# Patient Record
Sex: FEMALE | Race: BLACK OR AFRICAN AMERICAN | Employment: OTHER | URBAN - METROPOLITAN AREA
[De-identification: names, ages, dates, MRNs, and addresses within clinical notes are randomized per-mention and may not be internally consistent; named-entity substitution may affect disease eponyms.]

---

## 2017-05-22 ENCOUNTER — OFFICE VISIT (OUTPATIENT)
Dept: FAMILY MEDICINE CLINIC | Age: 67
End: 2017-05-22
Payer: COMMERCIAL

## 2017-05-22 VITALS
SYSTOLIC BLOOD PRESSURE: 132 MMHG | DIASTOLIC BLOOD PRESSURE: 75 MMHG | BODY MASS INDEX: 28.34 KG/M2 | WEIGHT: 187 LBS | HEART RATE: 72 BPM | HEIGHT: 68 IN | RESPIRATION RATE: 16 BRPM

## 2017-05-22 DIAGNOSIS — Z79.4 TYPE 2 DIABETES MELLITUS WITH HYPERGLYCEMIA, WITH LONG-TERM CURRENT USE OF INSULIN (HCC): Primary | ICD-10-CM

## 2017-05-22 DIAGNOSIS — R60.0 EDEMA OF BOTH LEGS: ICD-10-CM

## 2017-05-22 DIAGNOSIS — E11.65 TYPE 2 DIABETES MELLITUS WITH HYPERGLYCEMIA, WITH LONG-TERM CURRENT USE OF INSULIN (HCC): Primary | ICD-10-CM

## 2017-05-22 DIAGNOSIS — I10 ESSENTIAL HYPERTENSION: ICD-10-CM

## 2017-05-22 DIAGNOSIS — E55.9 VITAMIN D DEFICIENCY: ICD-10-CM

## 2017-05-22 DIAGNOSIS — E78.00 PURE HYPERCHOLESTEROLEMIA: ICD-10-CM

## 2017-05-22 PROCEDURE — 99205 OFFICE O/P NEW HI 60 MIN: CPT | Performed by: FAMILY MEDICINE

## 2017-05-22 RX ORDER — POTASSIUM CHLORIDE 20 MEQ/1
20 TABLET, EXTENDED RELEASE ORAL DAILY
Qty: 90 TABLET | Refills: 1 | Status: SHIPPED | OUTPATIENT
Start: 2017-05-22 | End: 2017-05-25 | Stop reason: SDUPTHER

## 2017-05-22 RX ORDER — FUROSEMIDE 40 MG/1
40 TABLET ORAL DAILY
Qty: 90 TABLET | Refills: 1 | Status: SHIPPED | OUTPATIENT
Start: 2017-05-22 | End: 2017-05-25 | Stop reason: SDUPTHER

## 2017-05-22 ASSESSMENT — PATIENT HEALTH QUESTIONNAIRE - PHQ9
1. LITTLE INTEREST OR PLEASURE IN DOING THINGS: 0
2. FEELING DOWN, DEPRESSED OR HOPELESS: 0
SUM OF ALL RESPONSES TO PHQ QUESTIONS 1-9: 0
SUM OF ALL RESPONSES TO PHQ9 QUESTIONS 1 & 2: 0

## 2017-05-24 ENCOUNTER — TELEPHONE (OUTPATIENT)
Dept: FAMILY MEDICINE CLINIC | Age: 67
End: 2017-05-24

## 2017-05-25 DIAGNOSIS — E11.65 TYPE 2 DIABETES MELLITUS WITH HYPERGLYCEMIA, WITH LONG-TERM CURRENT USE OF INSULIN (HCC): ICD-10-CM

## 2017-05-25 DIAGNOSIS — R60.0 EDEMA OF BOTH LEGS: ICD-10-CM

## 2017-05-25 DIAGNOSIS — Z79.4 TYPE 2 DIABETES MELLITUS WITH HYPERGLYCEMIA, WITH LONG-TERM CURRENT USE OF INSULIN (HCC): ICD-10-CM

## 2017-05-25 RX ORDER — POTASSIUM CHLORIDE 20 MEQ/1
20 TABLET, EXTENDED RELEASE ORAL DAILY
Qty: 90 TABLET | Refills: 1 | Status: SHIPPED | OUTPATIENT
Start: 2017-05-25 | End: 2017-05-26 | Stop reason: SDUPTHER

## 2017-05-25 RX ORDER — FUROSEMIDE 40 MG/1
40 TABLET ORAL DAILY
Qty: 90 TABLET | Refills: 1 | Status: SHIPPED | OUTPATIENT
Start: 2017-05-25 | End: 2017-05-26 | Stop reason: SDUPTHER

## 2017-05-26 DIAGNOSIS — E11.65 TYPE 2 DIABETES MELLITUS WITH HYPERGLYCEMIA, WITH LONG-TERM CURRENT USE OF INSULIN (HCC): ICD-10-CM

## 2017-05-26 DIAGNOSIS — R60.0 EDEMA OF BOTH LEGS: ICD-10-CM

## 2017-05-26 DIAGNOSIS — Z79.4 TYPE 2 DIABETES MELLITUS WITH HYPERGLYCEMIA, WITH LONG-TERM CURRENT USE OF INSULIN (HCC): ICD-10-CM

## 2017-05-26 RX ORDER — LANOLIN ALCOHOL/MO/W.PET/CERES
400 CREAM (GRAM) TOPICAL DAILY
Qty: 30 TABLET | Refills: 12 | Status: SHIPPED | OUTPATIENT
Start: 2017-05-26 | End: 2019-02-06

## 2017-05-26 RX ORDER — ATORVASTATIN CALCIUM 10 MG/1
10 TABLET, FILM COATED ORAL DAILY
Qty: 30 TABLET | Refills: 12 | Status: SHIPPED | OUTPATIENT
Start: 2017-05-26 | End: 2018-07-30 | Stop reason: SDUPTHER

## 2017-05-26 RX ORDER — POTASSIUM CHLORIDE 20 MEQ/1
20 TABLET, EXTENDED RELEASE ORAL DAILY
Qty: 90 TABLET | Refills: 1 | Status: SHIPPED | OUTPATIENT
Start: 2017-05-26 | End: 2017-12-08 | Stop reason: SDUPTHER

## 2017-05-26 RX ORDER — FUROSEMIDE 40 MG/1
40 TABLET ORAL DAILY
Qty: 90 TABLET | Refills: 1 | Status: SHIPPED | OUTPATIENT
Start: 2017-05-26 | End: 2017-12-08 | Stop reason: SDUPTHER

## 2017-05-26 RX ORDER — OMEPRAZOLE 20 MG/1
20 CAPSULE, DELAYED RELEASE ORAL DAILY
Qty: 30 CAPSULE | Refills: 12 | Status: SHIPPED | OUTPATIENT
Start: 2017-05-26 | End: 2018-01-08 | Stop reason: SDUPTHER

## 2017-05-26 RX ORDER — LEVOTHYROXINE SODIUM 0.05 MG/1
50 TABLET ORAL DAILY
Qty: 30 TABLET | Refills: 12 | Status: SHIPPED | OUTPATIENT
Start: 2017-05-26 | End: 2018-07-30 | Stop reason: SDUPTHER

## 2017-05-26 RX ORDER — ASPIRIN 81 MG/1
81 TABLET ORAL DAILY
Qty: 30 TABLET | Refills: 12 | Status: SHIPPED | OUTPATIENT
Start: 2017-05-26

## 2017-05-26 RX ORDER — METOCLOPRAMIDE 5 MG/1
5 TABLET ORAL 4 TIMES DAILY
Qty: 120 TABLET | Refills: 12 | Status: SHIPPED | OUTPATIENT
Start: 2017-05-26 | End: 2018-07-30 | Stop reason: SDUPTHER

## 2017-06-01 ENCOUNTER — HOSPITAL ENCOUNTER (OUTPATIENT)
Dept: DIABETES SERVICES | Age: 67
Setting detail: THERAPIES SERIES
Discharge: HOME | End: 2017-06-01
Payer: COMMERCIAL

## 2017-06-01 VITALS
DIASTOLIC BLOOD PRESSURE: 63 MMHG | HEART RATE: 71 BPM | HEIGHT: 69 IN | SYSTOLIC BLOOD PRESSURE: 126 MMHG | BODY MASS INDEX: 28.41 KG/M2 | WEIGHT: 191.8 LBS

## 2017-06-01 PROCEDURE — G0108 DIAB MANAGE TRN  PER INDIV: HCPCS

## 2017-06-06 ENCOUNTER — TELEPHONE (OUTPATIENT)
Dept: FAMILY MEDICINE CLINIC | Age: 67
End: 2017-06-06

## 2017-06-12 ENCOUNTER — HOSPITAL ENCOUNTER (OUTPATIENT)
Dept: DIABETES SERVICES | Age: 67
Setting detail: THERAPIES SERIES
Discharge: HOME | End: 2017-06-12
Payer: COMMERCIAL

## 2017-06-12 PROCEDURE — G0109 DIAB MANAGE TRN IND/GROUP: HCPCS

## 2017-06-14 LAB
ABSOLUTE BASO #: 0 K/UL (ref 0–0.1)
ABSOLUTE EOS #: 0.1 K/UL (ref 0.1–0.4)
ABSOLUTE LYMPH #: 1.8 K/UL (ref 0.8–5.2)
ABSOLUTE MONO #: 0.2 K/UL (ref 0.1–0.9)
ABSOLUTE NEUT #: 1.5 K/UL (ref 1.3–9.1)
ALBUMIN SERPL-MCNC: 3.9 G/DL (ref 3.2–5.3)
ALK PHOSPHATASE: 101 IU/L (ref 35–121)
ALT SERPL-CCNC: 15 IU/L (ref 5–59)
ANION GAP SERPL CALCULATED.3IONS-SCNC: 15 MMOL/L
AST SERPL-CCNC: 11 IU/L (ref 10–42)
BASOPHILS RELATIVE PERCENT: 0.3 %
BILIRUB SERPL-MCNC: 1.2 MG/DL (ref 0.2–1.3)
BUN BLDV-MCNC: 14 MG/DL (ref 10–20)
CALCIUM SERPL-MCNC: 9.3 MG/DL (ref 8.7–10.8)
CHLORIDE BLD-SCNC: 98 MMOL/L (ref 95–111)
CHOLESTEROL/HDL RATIO: 2.9
CHOLESTEROL: 143 MG/DL
CO2: 33 MMOL/L (ref 21–32)
CREAT SERPL-MCNC: 0.8 MG/DL (ref 0.5–1.3)
EGFR AFRICAN AMERICAN: 87
EGFR IF NONAFRICAN AMERICAN: 72
EOSINOPHILS RELATIVE PERCENT: 1.7 %
GLUCOSE: 213 MG/DL (ref 70–100)
HCT VFR BLD CALC: 36.1 % (ref 36–48)
HDLC SERPL-MCNC: 50 MG/DL (ref 40–60)
HEMOGLOBIN: 11.7 G/DL (ref 12–16)
LDL CHOLESTEROL CALCULATED: 71 MG/DL
LDL/HDL RATIO: 1.4
LYMPHOCYTE %: 50.4 %
MCH RBC QN AUTO: 27.3 PG (ref 27–34)
MCHC RBC AUTO-ENTMCNC: 32.4 G/DL (ref 31–36)
MCV RBC AUTO: 84.1 FL (ref 80–100)
MONOCYTES # BLD: 6.8 %
NEUTROPHILS RELATIVE PERCENT: 40.8 %
PDW BLD-RTO: 12.6 % (ref 10.8–14.8)
PLATELETS: 206 K/UL (ref 150–450)
POTASSIUM SERPL-SCNC: 3.9 MMOL/L (ref 3.5–5.4)
RBC: 4.29 M/UL (ref 4–5.5)
SODIUM BLD-SCNC: 142 MMOL/L (ref 134–147)
T4 FREE: 0.84 NG/DL (ref 0.8–1.8)
TOTAL PROTEIN: 6.3 G/DL (ref 5.8–8)
TRIGL SERPL-MCNC: 108 MG/DL
TSH SERPL DL<=0.05 MIU/L-ACNC: 1.04 UIU/ML (ref 0.4–4.4)
VLDLC SERPL CALC-MCNC: 22 MG/DL
WBC: 3.6 K/UL (ref 3.7–10.8)

## 2017-06-15 LAB
PARATHYROID HORMONE INTACT: 60 PG/ML (ref 11–67)
THYROID PEROXIDASE ANTIBODY: 1 IU/ML
VITAMIN D 25-HYDROXY: 29 NG/ML

## 2017-06-20 ENCOUNTER — HOSPITAL ENCOUNTER (OUTPATIENT)
Dept: DIABETES SERVICES | Age: 67
Setting detail: THERAPIES SERIES
Discharge: HOME | End: 2017-06-20
Payer: COMMERCIAL

## 2017-06-20 PROCEDURE — G0108 DIAB MANAGE TRN  PER INDIV: HCPCS

## 2017-06-28 ENCOUNTER — HOSPITAL ENCOUNTER (OUTPATIENT)
Dept: DIABETES SERVICES | Age: 67
Setting detail: THERAPIES SERIES
Discharge: HOME | End: 2017-06-28
Payer: COMMERCIAL

## 2017-06-28 DIAGNOSIS — E11.9 TYPE 2 DIABETES MELLITUS WITHOUT COMPLICATION, WITH LONG-TERM CURRENT USE OF INSULIN (HCC): Primary | ICD-10-CM

## 2017-06-28 DIAGNOSIS — Z79.4 TYPE 2 DIABETES MELLITUS WITHOUT COMPLICATION, WITH LONG-TERM CURRENT USE OF INSULIN (HCC): Primary | ICD-10-CM

## 2017-06-28 PROCEDURE — G0109 DIAB MANAGE TRN IND/GROUP: HCPCS

## 2017-06-29 RX ORDER — ISOPROPYL ALCOHOL 0.7 ML/1
1 SWAB TOPICAL 3 TIMES DAILY
Qty: 100 EACH | Refills: 0 | Status: SHIPPED | OUTPATIENT
Start: 2017-06-29 | End: 2017-07-24 | Stop reason: SDUPTHER

## 2017-06-29 RX ORDER — LANCETS 30 GAUGE
1 EACH MISCELLANEOUS 3 TIMES DAILY
Qty: 100 EACH | Refills: 3 | Status: SHIPPED | OUTPATIENT
Start: 2017-06-29 | End: 2017-08-30 | Stop reason: SDUPTHER

## 2017-06-29 RX ORDER — BLOOD-GLUCOSE METER
1 EACH MISCELLANEOUS 3 TIMES DAILY
Qty: 1 KIT | Refills: 0 | Status: SHIPPED | OUTPATIENT
Start: 2017-06-29 | End: 2017-08-30 | Stop reason: SDUPTHER

## 2017-07-06 ENCOUNTER — HOSPITAL ENCOUNTER (OUTPATIENT)
Dept: DIABETES SERVICES | Age: 67
Setting detail: THERAPIES SERIES
Discharge: HOME | End: 2017-07-06
Payer: COMMERCIAL

## 2017-07-06 DIAGNOSIS — E11.9 TYPE 2 DIABETES MELLITUS WITHOUT COMPLICATION, WITH LONG-TERM CURRENT USE OF INSULIN (HCC): Primary | ICD-10-CM

## 2017-07-06 DIAGNOSIS — Z79.4 TYPE 2 DIABETES MELLITUS WITHOUT COMPLICATION, WITH LONG-TERM CURRENT USE OF INSULIN (HCC): Primary | ICD-10-CM

## 2017-07-06 PROCEDURE — G0109 DIAB MANAGE TRN IND/GROUP: HCPCS

## 2017-07-10 ENCOUNTER — HOSPITAL ENCOUNTER (OUTPATIENT)
Age: 67
Setting detail: SPECIMEN
Discharge: HOME | End: 2017-07-10
Payer: COMMERCIAL

## 2017-07-10 ENCOUNTER — OFFICE VISIT (OUTPATIENT)
Dept: FAMILY MEDICINE CLINIC | Age: 67
End: 2017-07-10
Payer: COMMERCIAL

## 2017-07-10 VITALS
SYSTOLIC BLOOD PRESSURE: 133 MMHG | RESPIRATION RATE: 16 BRPM | BODY MASS INDEX: 27.85 KG/M2 | HEART RATE: 68 BPM | WEIGHT: 188 LBS | HEIGHT: 69 IN | DIASTOLIC BLOOD PRESSURE: 74 MMHG

## 2017-07-10 DIAGNOSIS — R60.0 EDEMA OF BOTH LEGS: ICD-10-CM

## 2017-07-10 DIAGNOSIS — E08.621 DIABETIC ULCER OF BOTH FEET ASSOCIATED WITH DIABETES MELLITUS DUE TO UNDERLYING CONDITION (HCC): ICD-10-CM

## 2017-07-10 DIAGNOSIS — Z13.9 SCREENING: ICD-10-CM

## 2017-07-10 DIAGNOSIS — E11.42 DIABETIC POLYNEUROPATHY ASSOCIATED WITH TYPE 2 DIABETES MELLITUS (HCC): ICD-10-CM

## 2017-07-10 DIAGNOSIS — I10 ESSENTIAL HYPERTENSION: ICD-10-CM

## 2017-07-10 DIAGNOSIS — Z79.4 TYPE 2 DIABETES MELLITUS WITH HYPERGLYCEMIA, WITH LONG-TERM CURRENT USE OF INSULIN (HCC): Primary | ICD-10-CM

## 2017-07-10 DIAGNOSIS — L97.529 DIABETIC ULCER OF BOTH FEET ASSOCIATED WITH DIABETES MELLITUS DUE TO UNDERLYING CONDITION (HCC): ICD-10-CM

## 2017-07-10 DIAGNOSIS — Q74.2 CONGENITAL FOOT ABNORMALITY: ICD-10-CM

## 2017-07-10 DIAGNOSIS — E11.65 TYPE 2 DIABETES MELLITUS WITH HYPERGLYCEMIA, WITH LONG-TERM CURRENT USE OF INSULIN (HCC): Primary | ICD-10-CM

## 2017-07-10 DIAGNOSIS — L97.519 DIABETIC ULCER OF BOTH FEET ASSOCIATED WITH DIABETES MELLITUS DUE TO UNDERLYING CONDITION (HCC): ICD-10-CM

## 2017-07-10 LAB
CREATININE URINE: 67.2 MG/DL (ref 28–217)
HBA1C MFR BLD: 10.4 %
MICROALBUMIN/CREAT 24H UR: <12 MG/L
MICROALBUMIN/CREAT UR-RTO: 18 MCG/MG CREAT

## 2017-07-10 PROCEDURE — 99214 OFFICE O/P EST MOD 30 MIN: CPT | Performed by: FAMILY MEDICINE

## 2017-07-10 PROCEDURE — 83036 HEMOGLOBIN GLYCOSYLATED A1C: CPT | Performed by: FAMILY MEDICINE

## 2017-07-12 ENCOUNTER — TELEPHONE (OUTPATIENT)
Dept: FAMILY MEDICINE CLINIC | Age: 67
End: 2017-07-12

## 2017-07-24 RX ORDER — ISOPROPYL ALCOHOL 0.75 G/1
SWAB TOPICAL
Qty: 100 EACH | Refills: 0 | Status: SHIPPED | OUTPATIENT
Start: 2017-07-24 | End: 2017-09-25 | Stop reason: SDUPTHER

## 2017-07-25 ENCOUNTER — HOSPITAL ENCOUNTER (OUTPATIENT)
Dept: MAMMOGRAPHY | Age: 67
Discharge: HOME | End: 2017-07-25
Payer: COMMERCIAL

## 2017-07-25 DIAGNOSIS — Z12.39 BREAST SCREENING: ICD-10-CM

## 2017-07-25 PROCEDURE — G0202 SCR MAMMO BI INCL CAD: HCPCS

## 2017-08-30 RX ORDER — BLOOD SUGAR DIAGNOSTIC
STRIP MISCELLANEOUS
Qty: 300 STRIP | Refills: 3 | Status: SHIPPED | OUTPATIENT
Start: 2017-08-30 | End: 2018-01-23 | Stop reason: ALTCHOICE

## 2017-08-30 RX ORDER — BLOOD-GLUCOSE METER
EACH MISCELLANEOUS
Qty: 1 KIT | Refills: 0 | Status: SHIPPED | OUTPATIENT
Start: 2017-08-30 | End: 2018-01-23 | Stop reason: ALTCHOICE

## 2017-08-30 RX ORDER — LANCETS
EACH MISCELLANEOUS
Qty: 300 EACH | Refills: 3 | Status: SHIPPED | OUTPATIENT
Start: 2017-08-30 | End: 2020-02-13 | Stop reason: SDUPTHER

## 2017-09-07 ENCOUNTER — CARE COORDINATION (OUTPATIENT)
Dept: CARE COORDINATION | Age: 67
End: 2017-09-07

## 2017-09-15 ENCOUNTER — CARE COORDINATION (OUTPATIENT)
Dept: CARE COORDINATION | Age: 67
End: 2017-09-15

## 2017-09-22 ENCOUNTER — CARE COORDINATION (OUTPATIENT)
Dept: CARE COORDINATION | Age: 67
End: 2017-09-22

## 2017-09-25 RX ORDER — ISOPROPYL ALCOHOL 0.75 G/1
SWAB TOPICAL
Qty: 300 EACH | Refills: 0 | Status: SHIPPED | OUTPATIENT
Start: 2017-09-25 | End: 2017-11-27 | Stop reason: SDUPTHER

## 2017-09-25 RX ORDER — BRIMONIDINE TARTRATE 2 MG/ML
1 SOLUTION/ DROPS OPHTHALMIC 2 TIMES DAILY
Qty: 1 BOTTLE | Refills: 0 | Status: SHIPPED | OUTPATIENT
Start: 2017-09-25

## 2017-09-26 ENCOUNTER — TELEPHONE (OUTPATIENT)
Dept: FAMILY MEDICINE CLINIC | Age: 67
End: 2017-09-26

## 2017-09-29 ENCOUNTER — CARE COORDINATION (OUTPATIENT)
Dept: CARE COORDINATION | Age: 67
End: 2017-09-29

## 2017-10-09 ENCOUNTER — OFFICE VISIT (OUTPATIENT)
Dept: FAMILY MEDICINE CLINIC | Age: 67
End: 2017-10-09
Payer: COMMERCIAL

## 2017-10-09 VITALS
BODY MASS INDEX: 28.5 KG/M2 | OXYGEN SATURATION: 97 % | HEIGHT: 69 IN | WEIGHT: 192.4 LBS | RESPIRATION RATE: 16 BRPM | SYSTOLIC BLOOD PRESSURE: 129 MMHG | DIASTOLIC BLOOD PRESSURE: 68 MMHG | HEART RATE: 72 BPM

## 2017-10-09 DIAGNOSIS — Z79.4 TYPE 2 DIABETES MELLITUS WITH HYPERGLYCEMIA, WITH LONG-TERM CURRENT USE OF INSULIN (HCC): ICD-10-CM

## 2017-10-09 DIAGNOSIS — E11.65 TYPE 2 DIABETES MELLITUS WITH HYPERGLYCEMIA, WITH LONG-TERM CURRENT USE OF INSULIN (HCC): ICD-10-CM

## 2017-10-09 LAB — HBA1C MFR BLD: 9.2 %

## 2017-10-09 PROCEDURE — 99213 OFFICE O/P EST LOW 20 MIN: CPT | Performed by: FAMILY MEDICINE

## 2017-10-09 PROCEDURE — 83036 HEMOGLOBIN GLYCOSYLATED A1C: CPT | Performed by: FAMILY MEDICINE

## 2017-10-09 NOTE — PROGRESS NOTES
[facility] PHYSICIANS  COMPREHENSIVE CARE  [facility] [address]  [address] [facility] [address]  Dept: [phone]      Margaret Mccormack is a 74 y.o. female who presents today for follow up on her  medical conditions as noted below. Chief Complaint   Patient presents with    Diabetes Mellitus     3 month check, refill, hga1c       There is no problem list on file for this patient. Past Medical History:   Diagnosis Date    Type II or unspecified type diabetes mellitus without mention of complication, not stated as uncontrolled       Past Surgical History:   Procedure Laterality Date    FOOT SURGERY  +10years    R foot      Family History   Problem Relation Age of Onset    Diabetes Mother     Diabetes Brother        Current Outpatient Prescriptions   Medication Sig Dispense Refill    insulin glargine (TOUJEO SOLOSTAR) 300 UNIT/ML injection pen Inject 70 Units into the skin nightly 21 mL 1    brimonidine (ALPHAGAN) 0.2 % ophthalmic solution Place 1 drop into both eyes 2 times daily 1 Bottle 0    Alcohol Swabs (B-D SINGLE USE SWABS REGULAR) PADS USE THREE TIMES DAILY 300 each 0    Accu-Chek Softclix Lancets MISC TEST THREE TIMES DAILY 300 each 3    Blood Glucose Monitoring Suppl (ACCU-CHEK MAMI PLUS) w/Device KIT USE THREE TIMES DAILY 1 kit 0    ACCU-CHEK MAMI PLUS strip USE  1  STRIP TO TEST THREE TIMES DAILY AS NEEDED 300 strip 3    glucose blood VI test strips (ACCU-CHEK MAMI) strip 1 each by In Vitro route 3 times daily As needed. 100 each 3    glucose blood VI test strips (ONETOUCH VERIO) strip 1 each by In Vitro route 2 times daily As needed.  200 each 3    Insulin Pen Needle 32G X 4 MM MISC 1 each by Does not apply route daily 100 each 3    furosemide (LASIX) 40 MG tablet Take 1 tablet by mouth daily 90 tablet 1    potassium chloride (KLOR-CON M) 20 MEQ extended release tablet Take 1 tablet by mouth daily 90 tablet 1    polysacchar iron-FA-B12 (FERREX 150 FORTE) 150-1-25 MG-MG-MCG CAPS capsule TAKE (1) CAPSULE TWICE A DAY 30 capsule 12    levothyroxine (SYNTHROID) 50 MCG tablet Take 1 tablet by mouth daily 30 tablet 12    atorvastatin (LIPITOR) 10 MG tablet Take 1 tablet by mouth daily 30 tablet 12    metoclopramide (REGLAN) 5 MG tablet Take 1 tablet by mouth 4 times daily 120 tablet 12    folic acid (FOLVITE) 624 MCG tablet Take 1 tablet by mouth daily 30 tablet 12    aspirin (ASPIRIN LOW DOSE) 81 MG EC tablet Take 1 tablet by mouth daily 30 tablet 12    omeprazole (PRILOSEC) 20 MG delayed release capsule Take 1 capsule by mouth Daily 30 capsule 12    dorzolamide-timolol (COSOPT) 22.3-6.8 MG/ML ophthalmic solution       travoprost, benzalkonium, (TRAVATAN) 0.004 % ophthalmic solution        No current facility-administered medications for this visit. ALLERGIES:  No Known Allergies    Social History   Substance Use Topics    Smoking status: Never Smoker    Smokeless tobacco: Never Used    Alcohol use No        LDL Calculated (mg/dL)   Date Value   06/13/2017 71     HDL (mg/dl)   Date Value   06/13/2017 50     BUN (mg/dl)   Date Value   06/13/2017 14     CREATININE (mg/dl)   Date Value   06/13/2017 0.8     Glucose (mg/dl)   Date Value   06/13/2017 213 (H)     Hemoglobin A1C (%)   Date Value   10/09/2017 9.2     Microalb/Crt. Ratio (mcg/mg creat)   Date Value   07/10/2017 18              Subjective:      HPI  She is here today for recheck on her diabetes her hemoglobin A1c is still running 9.2 she is taking 50 units of her insulin she has not titrated up  . She is still very frustrated because she has not gotten her special shoes she has club foot calluses neuropathy. Absent multiple letters to her insurance and still not able to get these covered for her    Review of Systems:     Constitutional: Negative for fever, appetite change and fatigue. Family social and medical history reviewed and unchanged     HENT: Negative.   Negative for nosebleeds, trouble swallowing and neck Genitourinary/Anorectal:deferred  Musculoskeletal: Normal range of motion. She exhibits no edema or tenderness. Lymphadenopathy: She has no cervical adenopathy. Neurological: She is alert and oriented to person, place, and time. She has normal reflexes. Skin: Skin is warm and dry. No rash noted. Psychiatric: She has a normal mood and affect. Her   behavior is normal.       Assessment:      1. Type 2 diabetes mellitus with hyperglycemia, with long-term current use of insulin ([facility] [address]          Plan:      Call or return to clinic prn if these symptoms worsen or fail to improve as anticipated. I have reviewed the instructions with the patient, answering all questions to her satisfaction. No Follow-up on file.   Orders Placed This Encounter   Procedures    POCT glycosylated hemoglobin (Hb A1C)     Orders Placed This Encounter   Medications    insulin glargine (TOUJEO SOLOSTAR) 300 UNIT/ML injection pen     Sig: Inject 70 Units into the skin nightly     Dispense:  21 mL     Refill:  1     Refilled her insulin to discuss titration of it with her  And will again try to get her she was covered for her  Electronically signed by Minal Ortega DO on 10/9/2017 at 5:53 PM

## 2017-10-16 ENCOUNTER — CARE COORDINATION (OUTPATIENT)
Dept: CARE COORDINATION | Age: 67
End: 2017-10-16

## 2017-10-16 NOTE — CARE COORDINATION
ophthalmic solution Place 1 drop into both eyes 2 times daily 9/25/17   Natalia CYN Faust, DO   Alcohol Swabs (B-D SINGLE USE SWABS REGULAR) PADS USE THREE TIMES DAILY 9/25/17   Natalia CYN Faust, DO   Accu-Chek Softclix Lancets MISC TEST THREE TIMES DAILY 8/30/17   Natalia Faust, DO   Blood Glucose Monitoring Suppl (ACCU-CHEK MAMI PLUS) w/Device KIT USE THREE TIMES DAILY 8/30/17   Natalia CYN Faust, DO   ACCU-CHEK MAMI PLUS strip USE  1  STRIP TO TEST THREE TIMES DAILY AS NEEDED 8/30/17   Natalia Faust, DO   glucose blood VI test strips (ACCU-CHEK MAMI) strip 1 each by In Vitro route 3 times daily As needed. 6/29/17   Natalia Faust, DO   glucose blood VI test strips (ONETOUCH VERIO) strip 1 each by In Vitro route 2 times daily As needed.  6/2/17   Natalia Faust, DO   Insulin Pen Needle 32G X 4 MM MISC 1 each by Does not apply route daily 5/26/17   Natalia Faust, DO   furosemide (LASIX) 40 MG tablet Take 1 tablet by mouth daily 5/26/17   Natalia Faust, DO   potassium chloride (KLOR-CON M) 20 MEQ extended release tablet Take 1 tablet by mouth daily 5/26/17   Natalia Faust, DO   polysacchar iron-FA-B12 (FERREX 150 FORTE) 150-1-25 MG-MG-MCG CAPS capsule TAKE (1) CAPSULE TWICE A DAY 5/26/17   Natalia Faust, DO   levothyroxine (SYNTHROID) 50 MCG tablet Take 1 tablet by mouth daily 5/26/17   Natalia Faust, DO   atorvastatin (LIPITOR) 10 MG tablet Take 1 tablet by mouth daily 5/26/17   Natalia Faust, DO   metoclopramide (REGLAN) 5 MG tablet Take 1 tablet by mouth 4 times daily 5/26/17   Natalia Faust, DO   folic acid (FOLVITE) 640 MCG tablet Take 1 tablet by mouth daily 5/26/17   Natalia Faust, DO   aspirin (ASPIRIN LOW DOSE) 81 MG EC tablet Take 1 tablet by mouth daily 5/26/17   Natalia Faust, DO   omeprazole (PRILOSEC) 20 MG delayed release capsule Take 1 capsule by mouth Daily 5/26/17   Natalia Faust DO   dorzolamide-timolol (COSOPT) 22.3-6.8 MG/ML ophthalmic solution  11/30/15   Historical Provider, MD   travoprost, benzalkonium, (TRAVATAN) 0.004 % ophthalmic solution  12/9/15   Historical Provider, MD       Future Appointments  Date Time Provider Armaan Green   11/10/2017 10:30 AM [facility] DIABETES  [address] [facility] DIAB ED [facility]

## 2017-10-24 ENCOUNTER — CARE COORDINATION (OUTPATIENT)
Dept: CARE COORDINATION | Age: 67
End: 2017-10-24

## 2017-10-24 NOTE — CARE COORDINATION
Ambulatory Care Coordination Note  10/24/2017  CM Risk Score: 5  Billy Mortality Risk Score: 2.16    ACC: Candelario Medrano RN    Summary Note: spoke with pt who said she had a low bs of 53 in the middle of the night. She did have some apple cider, and an energy bar her bs came up to 171. Will mail her info on hyperglycemia and hypoglycemia. After further review pt tells [facility] she did not eat breakfast she only had an ensure for lunch and an ensure for dinner no bedtime snack and took 40 units of insulin at bedtime. Strongly encouraged pt she needs to eat during the day when on insulin. A diabetic needs to eat regular meals meagan when on insulin. She will call office if she has any more lows and will work on [employer] more. Care Coordination Interventions    Program Enrollment:  Rising Risk  Referral from Primary Care Provider:  No  Suggested Interventions and Community Resources         Goals Addressed             Most Recent     Self Monitoring   On track (10/24/2017)             Self-Monitored Blood Glucose - I will notify my provider of any trends of increasing or decreasing blood sugars over a 1 month period. Patient Reported Blood Glucose No flowsheet data found. Barriers: lack of education  Plan for overcoming my barriers: N/A  Confidence: 5/10  Anticipated Goal Completion Date: 11/8/17               Prior to Admission medications    Medication Sig Start Date End Date Taking?  Authorizing Provider   insulin glargine (TOUJEO SOLOSTAR) 300 UNIT/ML injection pen Inject 70 Units into the skin nightly 10/9/17 1/7/18  Natalia Faust, DO   brimonidine (ALPHAGAN) 0.2 % ophthalmic solution Place 1 drop into both eyes 2 times daily 9/25/17   Natalia Faust, DO   Alcohol Swabs (B-D SINGLE USE SWABS REGULAR) PADS USE THREE TIMES DAILY 9/25/17   Natalia Faust, DO   Accu-Chek Softclix Lancets MISC TEST THREE TIMES DAILY 8/30/17   Natalia Faust, DO   Blood Glucose Monitoring Suppl (ACCU-CHEK MAMI PLUS) w/Device KIT USE THREE TIMES DAILY 8/30/17   Natalia Faust, DO   ACCU-CHEK MAMI PLUS strip USE  1  STRIP TO TEST THREE TIMES DAILY AS NEEDED 8/30/17   Natalia Faust, DO   glucose blood VI test strips (ACCU-CHEK MAMI) strip 1 each by In Vitro route 3 times daily As needed. 6/29/17   Natalia Faust, DO   glucose blood VI test strips (ONETOUCH VERIO) strip 1 each by In Vitro route 2 times daily As needed.  6/2/17   Natalia Faust, DO   Insulin Pen Needle 32G X 4 MM MISC 1 each by Does not apply route daily 5/26/17   Natalia Faust, DO   furosemide (LASIX) 40 MG tablet Take 1 tablet by mouth daily 5/26/17   Natalia Faust, DO   potassium chloride (KLOR-CON M) 20 MEQ extended release tablet Take 1 tablet by mouth daily 5/26/17   Natalia Faust, DO   polysacchar iron-FA-B12 (FERREX 150 FORTE) 150-1-25 MG-MG-MCG CAPS capsule TAKE (1) CAPSULE TWICE A DAY 5/26/17   Natalia Faust, DO   levothyroxine (SYNTHROID) 50 MCG tablet Take 1 tablet by mouth daily 5/26/17   Natalia Faust, DO   atorvastatin (LIPITOR) 10 MG tablet Take 1 tablet by mouth daily 5/26/17   Natalia Faust, DO   metoclopramide (REGLAN) 5 MG tablet Take 1 tablet by mouth 4 times daily 5/26/17   Natalia Faust, DO   folic acid (FOLVITE) 467 MCG tablet Take 1 tablet by mouth daily 5/26/17   Natalia Faust, DO   aspirin (ASPIRIN LOW DOSE) 81 MG EC tablet Take 1 tablet by mouth daily 5/26/17   Natalia Faust, DO   omeprazole (PRILOSEC) 20 MG delayed release capsule Take 1 capsule by mouth Daily 5/26/17   Natalia Faust, DO   dorzolamide-timolol (COSOPT) 22.3-6.8 MG/ML ophthalmic solution  11/30/15   Historical Provider, MD   travoprost, benzalkonium, (TRAVATAN) 0.004 % ophthalmic solution  12/9/15   Historical Provider, MD       Future Appointments  Date Time Provider Armaan Green   11/10/2017 10:30 AM [facility] DIABETES  [address] [facility] DIAB ED [facility]

## 2017-10-31 ENCOUNTER — CARE COORDINATION (OUTPATIENT)
Dept: CARE COORDINATION | Age: 67
End: 2017-10-31

## 2017-10-31 NOTE — CARE COORDINATION
glucose blood VI test strips (ONETOUCH VERIO) strip 1 each by In Vitro route 2 times daily As needed.  6/2/17   Natalia Faust, DO   Insulin Pen Needle 32G X 4 MM MISC 1 each by Does not apply route daily 5/26/17   Natalia Faust, DO   furosemide (LASIX) 40 MG tablet Take 1 tablet by mouth daily 5/26/17   Natalia Faust, DO   potassium chloride (KLOR-CON M) 20 MEQ extended release tablet Take 1 tablet by mouth daily 5/26/17   Natalia Faust, DO   polysacchar iron-FA-B12 (FERREX 150 FORTE) 150-1-25 MG-MG-MCG CAPS capsule TAKE (1) CAPSULE TWICE A DAY 5/26/17   Natalia Faust, DO   levothyroxine (SYNTHROID) 50 MCG tablet Take 1 tablet by mouth daily 5/26/17   Natalia Faust, DO   atorvastatin (LIPITOR) 10 MG tablet Take 1 tablet by mouth daily 5/26/17   Natalia Faust, DO   metoclopramide (REGLAN) 5 MG tablet Take 1 tablet by mouth 4 times daily 5/26/17   Natalia Faust, DO   folic acid (FOLVITE) 385 MCG tablet Take 1 tablet by mouth daily 5/26/17   Natalia Faust, DO   aspirin (ASPIRIN LOW DOSE) 81 MG EC tablet Take 1 tablet by mouth daily 5/26/17   Natalia Faust, DO   omeprazole (PRILOSEC) 20 MG delayed release capsule Take 1 capsule by mouth Daily 5/26/17   Natalia Faust, DO   dorzolamide-timolol (COSOPT) 22.3-6.8 MG/ML ophthalmic solution  11/30/15   Historical Provider, MD   travoprost, benzalkonium, (TRAVATAN) 0.004 % ophthalmic solution  12/9/15   Historical Provider, MD       Future Appointments  Date Time Provider Armaan Green   11/10/2017 10:30 AM [facility] DIABETES  [address] DIAB ED [facility]

## 2017-11-07 ENCOUNTER — CARE COORDINATION (OUTPATIENT)
Dept: CARE COORDINATION | Age: 67
End: 2017-11-07

## 2017-11-14 ENCOUNTER — CARE COORDINATION (OUTPATIENT)
Dept: CARE COORDINATION | Age: 67
End: 2017-11-14

## 2017-11-28 ENCOUNTER — CARE COORDINATION (OUTPATIENT)
Dept: CARE COORDINATION | Age: 67
End: 2017-11-28

## 2017-11-30 RX ORDER — ISOPROPYL ALCOHOL 0.75 G/1
SWAB TOPICAL
Qty: 100 EACH | Refills: 2 | Status: SHIPPED | OUTPATIENT
Start: 2017-11-30 | End: 2018-02-05 | Stop reason: SDUPTHER

## 2017-12-05 ENCOUNTER — CARE COORDINATION (OUTPATIENT)
Dept: CARE COORDINATION | Age: 67
End: 2017-12-05

## 2017-12-05 NOTE — CARE COORDINATION
DAILY 8/30/17   Natalia Faust, DO   Blood Glucose Monitoring Suppl (ACCU-CHEK MAMI PLUS) w/Device KIT USE THREE TIMES DAILY 8/30/17   Natalia Faust, DO   ACCU-CHEK MAMI PLUS strip USE  1  STRIP TO TEST THREE TIMES DAILY AS NEEDED 8/30/17   Natalia Faust, DO   glucose blood VI test strips (ACCU-CHEK MAMI) strip 1 each by In Vitro route 3 times daily As needed. 6/29/17   Natalia Faust, DO   glucose blood VI test strips (ONETOUCH VERIO) strip 1 each by In Vitro route 2 times daily As needed. 6/2/17   Natalia Faust, DO   Insulin Pen Needle 32G X 4 MM MISC 1 each by Does not apply route daily 5/26/17   Natalia Faust, DO   furosemide (LASIX) 40 MG tablet Take 1 tablet by mouth daily 5/26/17   Natalia Faust, DO   potassium chloride (KLOR-CON M) 20 MEQ extended release tablet Take 1 tablet by mouth daily 5/26/17   Natalia Faust, DO   polysacchar iron-FA-B12 (FERREX 150 FORTE) 150-1-25 MG-MG-MCG CAPS capsule TAKE (1) CAPSULE TWICE A DAY 5/26/17   Natalia Faust, DO   levothyroxine (SYNTHROID) 50 MCG tablet Take 1 tablet by mouth daily 5/26/17   Natalia Faust, DO   atorvastatin (LIPITOR) 10 MG tablet Take 1 tablet by mouth daily 5/26/17   Natalia Faust, DO   metoclopramide (REGLAN) 5 MG tablet Take 1 tablet by mouth 4 times daily 5/26/17   Natalia Faust, DO   folic acid (FOLVITE) 519 MCG tablet Take 1 tablet by mouth daily 5/26/17   Natalia Faust, DO   aspirin (ASPIRIN LOW DOSE) 81 MG EC tablet Take 1 tablet by mouth daily 5/26/17   Natalia Faust, DO   omeprazole (PRILOSEC) 20 MG delayed release capsule Take 1 capsule by mouth Daily 5/26/17   Natalia Faust, DO   dorzolamide-timolol (COSOPT) 22.3-6.8 MG/ML ophthalmic solution  11/30/15   Historical MD [place]   travoprost, benzalkonium, (TRAVATAN) 0.004 % ophthalmic solution  12/9/15   Historical Provider, MD       No future appointments.

## 2017-12-08 DIAGNOSIS — R60.0 EDEMA OF BOTH LEGS: ICD-10-CM

## 2017-12-11 RX ORDER — FUROSEMIDE 40 MG/1
TABLET ORAL
Qty: 90 TABLET | Refills: 1 | Status: SHIPPED | OUTPATIENT
Start: 2017-12-11 | End: 2019-06-03 | Stop reason: SDUPTHER

## 2017-12-11 RX ORDER — POTASSIUM CHLORIDE 20 MEQ/1
TABLET, EXTENDED RELEASE ORAL
Qty: 90 TABLET | Refills: 1 | Status: SHIPPED | OUTPATIENT
Start: 2017-12-11 | End: 2019-06-03 | Stop reason: SDUPTHER

## 2017-12-19 ENCOUNTER — CARE COORDINATION (OUTPATIENT)
Dept: CARE COORDINATION | Age: 67
End: 2017-12-19

## 2017-12-19 NOTE — CARE COORDINATION
Ambulatory Care Coordination Note  12/19/2017  CM Risk Score: 5  Billy Mortality Risk Score: 2.16    ACC: Radha Hernandez, RN    Summary Note: spoke with pt who said she has not been checking her bs as much as she should yesterday her bs was 190. She has been tying to eat better. She is taking her insulin. Reminded her to check her bs several times a day at least fasting and at bedtime. Diabetes Assessment    Medic Alert ID:  No  Meal Planning:  None   Do you have barriers with adherence to non-pharmacologic self-management interventions? (Nutrition/Exercise/Self-Monitoring):  No   Have you ever had to go to the ED for symptoms of low blood sugar?:  No       No patient-reported symptoms              Care Coordination Interventions    Program Enrollment:  Rising Risk  Referral from Primary Care Provider:  No  Suggested Interventions and Community Resources         Goals Addressed     None          Prior to Admission medications    Medication Sig Start Date End Date Taking?  Authorizing Provider   potassium chloride (KLOR-CON M) 20 MEQ extended release tablet TAKE 1 TABLET EVERY DAY 12/11/17   Natalia Faust, DO   furosemide (LASIX) 40 MG tablet TAKE 1 TABLET EVERY DAY 12/11/17   Natalia Faust, DO   Alcohol Swabs (B-D SINGLE USE SWABS REGULAR) PADS USE THREE TIMES DAILY 11/30/17   Natalia Faust, DO   insulin glargine (TOUJEO SOLOSTAR) 300 UNIT/ML injection pen Inject 70 Units into the skin nightly  Patient taking differently: Inject 50 Units into the skin nightly States bs low 10/9/17 1/7/18  Natalia Faust, DO   brimonidine (ALPHAGAN) 0.2 % ophthalmic solution Place 1 drop into both eyes 2 times daily 9/25/17   Natalia Faust, DO   Accu-Chek Softclix Lancets MISC TEST THREE TIMES DAILY 8/30/17   Natalia Faust, DO   Blood Glucose Monitoring Suppl (ACCU-CHEK MAMI PLUS) w/Device KIT USE THREE TIMES DAILY 8/30/17   Natalia Faust, DO   ACCU-CHEK MAMI PLUS strip USE  1  STRIP TO TEST THREE TIMES DAILY AS NEEDED 8/30/17 Natalia Faust, DO   glucose blood VI test strips (ACCU-CHEK MAMI) strip 1 each by In Vitro route 3 times daily As needed. 6/29/17   Natalia Faust, DO   glucose blood VI test strips (ONETOUCH VERIO) strip 1 each by In Vitro route 2 times daily As needed. 6/2/17   Natalia Faust, DO   Insulin Pen Needle 32G X 4 MM MISC 1 each by Does not apply route daily 5/26/17   Natalia Faust, DO   polysacchar iron-FA-B12 (FERREX 150 FORTE) 150-1-25 MG-MG-MCG CAPS capsule TAKE (1) CAPSULE TWICE A DAY 5/26/17   Natalia Faust, DO   levothyroxine (SYNTHROID) 50 MCG tablet Take 1 tablet by mouth daily 5/26/17   Natalia Faust, DO   atorvastatin (LIPITOR) 10 MG tablet Take 1 tablet by mouth daily 5/26/17   Natalia Faust, DO   metoclopramide (REGLAN) 5 MG tablet Take 1 tablet by mouth 4 times daily 5/26/17   Natalia Faust, DO   folic acid (FOLVITE) 970 MCG tablet Take 1 tablet by mouth daily 5/26/17   Natalia Faust, DO   aspirin (ASPIRIN LOW DOSE) 81 MG EC tablet Take 1 tablet by mouth daily 5/26/17   Natalia Faust, DO   omeprazole (PRILOSEC) 20 MG delayed release capsule Take 1 capsule by mouth Daily 5/26/17   Natalia Faust, DO   dorzolamide-timolol (COSOPT) 22.3-6.8 MG/ML ophthalmic solution  11/30/15   Historical Provider, MD   travoprost, benzalkonium, (TRAVATAN) 0.004 % ophthalmic solution  12/9/15   Historical Provider, MD       No future appointments.

## 2017-12-22 DIAGNOSIS — E11.65 TYPE 2 DIABETES MELLITUS WITH HYPERGLYCEMIA, WITH LONG-TERM CURRENT USE OF INSULIN (HCC): ICD-10-CM

## 2017-12-22 DIAGNOSIS — Z79.4 TYPE 2 DIABETES MELLITUS WITH HYPERGLYCEMIA, WITH LONG-TERM CURRENT USE OF INSULIN (HCC): ICD-10-CM

## 2017-12-23 RX ORDER — INSULIN GLARGINE 300 U/ML
INJECTION, SOLUTION SUBCUTANEOUS
Qty: 4.5 ML | Refills: 3 | Status: SHIPPED | OUTPATIENT
Start: 2017-12-23 | End: 2018-01-08 | Stop reason: SDUPTHER

## 2017-12-27 DIAGNOSIS — Z01.00 VISION TEST: Primary | ICD-10-CM

## 2018-01-02 ENCOUNTER — CARE COORDINATION (OUTPATIENT)
Dept: CARE COORDINATION | Age: 68
End: 2018-01-02

## 2018-01-02 ENCOUNTER — TELEPHONE (OUTPATIENT)
Dept: FAMILY MEDICINE CLINIC | Age: 68
End: 2018-01-02

## 2018-01-08 ENCOUNTER — OFFICE VISIT (OUTPATIENT)
Dept: FAMILY MEDICINE CLINIC | Age: 68
End: 2018-01-08
Payer: MEDICARE

## 2018-01-08 VITALS
SYSTOLIC BLOOD PRESSURE: 125 MMHG | BODY MASS INDEX: 29.03 KG/M2 | WEIGHT: 196 LBS | DIASTOLIC BLOOD PRESSURE: 78 MMHG | HEIGHT: 69 IN | RESPIRATION RATE: 16 BRPM | HEART RATE: 64 BPM

## 2018-01-08 DIAGNOSIS — Z79.4 TYPE 2 DIABETES MELLITUS WITH HYPERGLYCEMIA, WITH LONG-TERM CURRENT USE OF INSULIN (HCC): ICD-10-CM

## 2018-01-08 DIAGNOSIS — J06.9 ACUTE URI: ICD-10-CM

## 2018-01-08 DIAGNOSIS — K21.00 GASTROESOPHAGEAL REFLUX DISEASE WITH ESOPHAGITIS: Primary | ICD-10-CM

## 2018-01-08 DIAGNOSIS — E11.65 TYPE 2 DIABETES MELLITUS WITH HYPERGLYCEMIA, WITH LONG-TERM CURRENT USE OF INSULIN (HCC): ICD-10-CM

## 2018-01-08 LAB — HBA1C MFR BLD: 9.3 %

## 2018-01-08 PROCEDURE — G8400 PT W/DXA NO RESULTS DOC: HCPCS | Performed by: FAMILY MEDICINE

## 2018-01-08 PROCEDURE — 3017F COLORECTAL CA SCREEN DOC REV: CPT | Performed by: FAMILY MEDICINE

## 2018-01-08 PROCEDURE — G8484 FLU IMMUNIZE NO ADMIN: HCPCS | Performed by: FAMILY MEDICINE

## 2018-01-08 PROCEDURE — 1036F TOBACCO NON-USER: CPT | Performed by: FAMILY MEDICINE

## 2018-01-08 PROCEDURE — G8417 CALC BMI ABV UP PARAM F/U: HCPCS | Performed by: FAMILY MEDICINE

## 2018-01-08 PROCEDURE — 1090F PRES/ABSN URINE INCON ASSESS: CPT | Performed by: FAMILY MEDICINE

## 2018-01-08 PROCEDURE — 4040F PNEUMOC VAC/ADMIN/RCVD: CPT | Performed by: FAMILY MEDICINE

## 2018-01-08 PROCEDURE — 3046F HEMOGLOBIN A1C LEVEL >9.0%: CPT | Performed by: FAMILY MEDICINE

## 2018-01-08 PROCEDURE — 3014F SCREEN MAMMO DOC REV: CPT | Performed by: FAMILY MEDICINE

## 2018-01-08 PROCEDURE — 99214 OFFICE O/P EST MOD 30 MIN: CPT | Performed by: FAMILY MEDICINE

## 2018-01-08 PROCEDURE — G8427 DOCREV CUR MEDS BY ELIG CLIN: HCPCS | Performed by: FAMILY MEDICINE

## 2018-01-08 PROCEDURE — 1123F ACP DISCUSS/DSCN MKR DOCD: CPT | Performed by: FAMILY MEDICINE

## 2018-01-08 PROCEDURE — 83036 HEMOGLOBIN GLYCOSYLATED A1C: CPT | Performed by: FAMILY MEDICINE

## 2018-01-08 RX ORDER — OMEPRAZOLE 40 MG/1
40 CAPSULE, DELAYED RELEASE ORAL DAILY
Qty: 30 CAPSULE | Refills: 11 | Status: SHIPPED | OUTPATIENT
Start: 2018-01-08 | End: 2019-02-06

## 2018-01-08 RX ORDER — GUAIFENESIN 600 MG/1
600 TABLET, EXTENDED RELEASE ORAL 2 TIMES DAILY
Qty: 60 TABLET | Refills: 0 | Status: SHIPPED | OUTPATIENT
Start: 2018-01-08 | End: 2019-02-06

## 2018-01-08 NOTE — PROGRESS NOTES
[facility] PHYSICIANS  COMPREHENSIVE CARE  [facility] [address]  [address] [address]  Dept: [phone]      Rossi Thomas is a 74 y.o. female who presents today for follow up on her  medical conditions as noted below. Chief Complaint   Patient presents with    Nasal Congestion     states she wakes up every day and is congestion, she is always blwoing her nose, no cough, no dizziness. she has not tried any medications OTC to help her.  Diabetes     3 month check       There is no problem list on file for this patient.      Past Medical History:   Diagnosis Date    Type II or unspecified type diabetes mellitus without mention of complication, not stated as uncontrolled       Past Surgical History:   Procedure Laterality Date    FOOT SURGERY  +10years    R foot      Family History   Problem Relation Age of Onset    Diabetes Mother     Diabetes Brother        Current Outpatient Prescriptions   Medication Sig Dispense Refill    omeprazole (PRILOSEC) 40 MG delayed release capsule Take 1 capsule by mouth Daily 30 capsule 11    insulin glargine (TOUJEO SOLOSTAR) 300 UNIT/ML injection pen Inject 60 Units into the skin nightly 8 pen 3    guaiFENesin (MUCINEX) 600 MG extended release tablet Take 1 tablet by mouth 2 times daily 60 tablet 0    potassium chloride (KLOR-CON M) 20 MEQ extended release tablet TAKE 1 TABLET EVERY DAY 90 tablet 1    furosemide (LASIX) 40 MG tablet TAKE 1 TABLET EVERY DAY 90 tablet 1    Alcohol Swabs (B-D SINGLE USE SWABS REGULAR) PADS USE THREE TIMES DAILY 100 each 2    brimonidine (ALPHAGAN) 0.2 % ophthalmic solution Place 1 drop into both eyes 2 times daily 1 Bottle 0    Accu-Chek Softclix Lancets MISC TEST THREE TIMES DAILY 300 each 3    Blood Glucose Monitoring Suppl (ACCU-CHEK MAMI PLUS) w/Device KIT USE THREE TIMES DAILY 1 kit 0    ACCU-CHEK MAMI PLUS strip USE  1  STRIP TO TEST THREE TIMES DAILY AS NEEDED 300 strip 3    glucose blood VI test strips with occasional higher ones in the 200s. She does not know what her postprandial sugars are running  during 50 units of insulin at night. She also states she has a lot of phlegm and clears her throat frequently she is taking omeprazole 20 mg and Reglan 5 mg q.i.d. She also states she has a runny nose and some mild congestion    Review of Systems:     Constitutional: Negative for fever, appetite change and fatigue. Family social and medical history reviewed and unchanged     HENT: Negative. Negative for nosebleeds, trouble swallowing and neck pain. Eyes: Negative for photophobia and visual disturbance. Respiratory: Negative. Negative for chest tightness and shortness of breath. Cardiovascular: Negative. Negative for chest pain and leg swelling. Gastrointestinal: Negative. Negative for abdominal pain and blood in stool. Endocrine: Negative for cold intolerance and polyuria. Genitourinary: Negative for dysuria and hematuria. Musculoskeletal: Negative. Skin: Negative for rash. Allergic/Immunologic: Negative. Neurological: Negative. Negative for dizziness, weakness and numbness. Hematological: Negative. Negative for adenopathy. Does not bruise/bleed easily. Psychiatric/Behavioral: Negative for sleep disturbance, dysphoric mood and  decreased concentration. The patient is not nervous/anxious. Objective:     Physical Exam:     Nursing note and vitals reviewed. /78   Pulse 64   Resp 16   Ht 5' 9.02\" (1.753 m)   Wt 196 lb (88.9 kg)   Breastfeeding? No   BMI 28.93 kg/m²   Constitutional: She is oriented to person, place, and time. She   appears well-developed and well-nourished. HENT:   Head: Normocephalic and atraumatic. Right Ear: External ear normal. Tympanic membrane is not erythematous. No middle ear effusion. Left Ear: External ear normal. Tympanic membrane is not erythematous. No middle ear effusion. Nose: No mucosal edema.    Mouth/Throat:

## 2018-01-09 ENCOUNTER — CARE COORDINATION (OUTPATIENT)
Dept: CARE COORDINATION | Age: 68
End: 2018-01-09

## 2018-01-09 NOTE — CARE COORDINATION
skin nightly  Patient taking differently: Inject 50 Units into the skin nightly States bs low 10/9/17 1/7/18  Natalia Faust, DO   brimonidine (ALPHAGAN) 0.2 % ophthalmic solution Place 1 drop into both eyes 2 times daily 9/25/17   Natalia Faust, DO   Accu-Chek Softclix Lancets MISC TEST THREE TIMES DAILY 8/30/17   Natalia Faust, DO   Blood Glucose Monitoring Suppl (ACCU-CHEK MAMI PLUS) w/Device KIT USE THREE TIMES DAILY 8/30/17   Natalia Faust, DO   ACCU-CHEK MAMI PLUS strip USE  1  STRIP TO TEST THREE TIMES DAILY AS NEEDED 8/30/17   Natalia Faust, DO   glucose blood VI test strips (ACCU-CHEK MAMI) strip 1 each by In Vitro route 3 times daily As needed. 6/29/17   Natalia Faust, DO   glucose blood VI test strips (ONETOUCH VERIO) strip 1 each by In Vitro route 2 times daily As needed.  6/2/17   Natalia Faust, DO   Insulin Pen Needle 32G X 4 MM MISC 1 each by Does not apply route daily 5/26/17   Natalia Faust, DO   polysacchar iron-FA-B12 (FERREX 150 FORTE) 150-1-25 MG-MG-MCG CAPS capsule TAKE (1) CAPSULE TWICE A DAY 5/26/17   Natalia Faust, DO   levothyroxine (SYNTHROID) 50 MCG tablet Take 1 tablet by mouth daily 5/26/17   Natalia Faust, DO   atorvastatin (LIPITOR) 10 MG tablet Take 1 tablet by mouth daily 5/26/17   Natalia Faust, DO   metoclopramide (REGLAN) 5 MG tablet Take 1 tablet by mouth 4 times daily 5/26/17   Natalia Faust, DO   folic acid (FOLVITE) 950 MCG tablet Take 1 tablet by mouth daily 5/26/17   Natalia Faust, DO   aspirin (ASPIRIN LOW DOSE) 81 MG EC tablet Take 1 tablet by mouth daily 5/26/17   Natalia Faust, DO   dorzolamide-timolol (COSOPT) 22.3-6.8 MG/ML ophthalmic solution  11/30/15   Historical Provider, MD   travoprost, benzalkonium, (TRAVATAN) 0.004 % ophthalmic solution  12/9/15   Historical Provider, MD       Future Appointments  Date Time Provider Armaan Green   4/9/2018 10:45 AM Kirill Hatch, Vinay Cheng

## 2018-01-16 ENCOUNTER — CARE COORDINATION (OUTPATIENT)
Dept: CARE COORDINATION | Age: 68
End: 2018-01-16

## 2018-01-23 ENCOUNTER — CARE COORDINATION (OUTPATIENT)
Dept: CARE COORDINATION | Age: 68
End: 2018-01-23

## 2018-02-06 ENCOUNTER — CARE COORDINATION (OUTPATIENT)
Dept: CARE COORDINATION | Age: 68
End: 2018-02-06

## 2018-02-06 NOTE — CARE COORDINATION
600 MG extended release tablet Take 1 tablet by mouth 2 times daily 1/8/18   Natalia Faust, DO   potassium chloride (KLOR-CON M) 20 MEQ extended release tablet TAKE 1 TABLET EVERY DAY 12/11/17   Natalia Faust, DO   furosemide (LASIX) 40 MG tablet TAKE 1 TABLET EVERY DAY 12/11/17   Natalia Faust, DO   Alcohol Swabs (B-D SINGLE USE SWABS REGULAR) PADS USE THREE TIMES DAILY 11/30/17   Natalia Faust, DO   brimonidine (ALPHAGAN) 0.2 % ophthalmic solution Place 1 drop into both eyes 2 times daily 9/25/17   Natalia Faust, DO   Accu-Chek Softclix Lancets MISC TEST THREE TIMES DAILY 8/30/17   Natalia Faust, DO   glucose blood VI test strips (ACCU-CHEK MAMI) strip 1 each by In Vitro route 3 times daily As needed.  6/29/17   Natalia Faust, DO   Insulin Pen Needle 32G X 4 MM MISC 1 each by Does not apply route daily 5/26/17   Natalia Faust, DO   polysacchar iron-FA-B12 (FERREX 150 FORTE) 150-1-25 MG-MG-MCG CAPS capsule TAKE (1) CAPSULE TWICE A DAY 5/26/17   Natalia Faust, DO   levothyroxine (SYNTHROID) 50 MCG tablet Take 1 tablet by mouth daily 5/26/17   Natalia Faust, DO   atorvastatin (LIPITOR) 10 MG tablet Take 1 tablet by mouth daily 5/26/17   Natalia Faust, DO   metoclopramide (REGLAN) 5 MG tablet Take 1 tablet by mouth 4 times daily 5/26/17   Natalia Faust, DO   folic acid (FOLVITE) 435 MCG tablet Take 1 tablet by mouth daily 5/26/17   Natalia Faust, DO   aspirin (ASPIRIN LOW DOSE) 81 MG EC tablet Take 1 tablet by mouth daily 5/26/17   Natalia Faust, DO   dorzolamide-timolol (COSOPT) 22.3-6.8 MG/ML ophthalmic solution  11/30/15   Historical Provider, MD   travoprost, benzalkonium, (TRAVATAN) 0.004 % ophthalmic solution  12/9/15   Historical Provider, MD       Future Appointments  Date Time Provider Armaan Green   4/9/2018 10:45 AM Vinay Daniels

## 2018-02-07 RX ORDER — ISOPROPYL ALCOHOL 0.75 G/1
SWAB TOPICAL
Qty: 300 EACH | Refills: 2 | Status: SHIPPED | OUTPATIENT
Start: 2018-02-07 | End: 2020-02-13 | Stop reason: SDUPTHER

## 2018-02-20 ENCOUNTER — CARE COORDINATION (OUTPATIENT)
Dept: CARE COORDINATION | Age: 68
End: 2018-02-20

## 2018-02-27 ENCOUNTER — CARE COORDINATION (OUTPATIENT)
Dept: CARE COORDINATION | Age: 68
End: 2018-02-27

## 2018-02-27 NOTE — CARE COORDINATION
Ambulatory Care Coordination Note  2/27/2018  CM Risk Score: 5  Billy Mortality Risk Score: 2.16    ACC: Montez Ortez, RN    Summary Note: spoke with pt who said she is doing ok. She did run out of insulin over the weekend and had to wait until Monday to . She is taking her insulin now yesterday her bs was 300 and today it is 254. She said she has not eaten today did encourage her to eat today and work on small frequent meals with 15-30 gm of carbs with a protein. She will work on this. She denies any needs and agrees to call the office if her bs does not come down. Diabetes Assessment    Medic Alert ID:  No  Meal Planning:  None   How often do you test your blood sugar?:  Daily   Do you have barriers with adherence to non-pharmacologic self-management interventions? (Nutrition/Exercise/Self-Monitoring):  No   Have you ever had to go to the ED for symptoms of low blood sugar?:  No       Increase or Decrease trend in Blood Sugars                Care Coordination Interventions    Program Enrollment:  Rising Risk  Referral from Primary Care Provider:  No  Suggested Interventions and Community Resources         Goals Addressed             Most Recent     Self Monitoring   No change (2/27/2018)             Self-Monitored Blood Glucose - I will notify my provider of any trends of increasing or decreasing blood sugars over a 1 month period. Patient Reported Blood Glucose No flowsheet data found. Barriers: lack of education  Plan for overcoming my barriers: N/A  Confidence: 5/10  Anticipated Goal Completion Date: 11/8/17               Prior to Admission medications    Medication Sig Start Date End Date Taking?  Authorizing Provider   Alcohol Swabs (B-D SINGLE USE SWABS REGULAR) PADS USE THREE TIMES DAILY 2/7/18   Natalia Faust, DO   omeprazole (PRILOSEC) 40 MG delayed release capsule Take 1 capsule by mouth Daily 1/8/18   Natalia Faust, DO   insulin glargine (TOUJEO SOLOSTAR) 300 UNIT/ML injection pen

## 2018-03-13 ENCOUNTER — CARE COORDINATION (OUTPATIENT)
Dept: CARE COORDINATION | Age: 68
End: 2018-03-13

## 2018-03-14 ENCOUNTER — TELEPHONE (OUTPATIENT)
Dept: FAMILY MEDICINE CLINIC | Age: 68
End: 2018-03-14

## 2018-03-14 DIAGNOSIS — Z01.00 DIABETIC EYE EXAM (HCC): Primary | ICD-10-CM

## 2018-03-14 DIAGNOSIS — E11.9 DIABETIC EYE EXAM (HCC): Primary | ICD-10-CM

## 2018-03-16 ENCOUNTER — TELEPHONE (OUTPATIENT)
Dept: FAMILY MEDICINE CLINIC | Age: 68
End: 2018-03-16

## 2018-03-16 DIAGNOSIS — L97.509 DIABETES MELLITUS DUE TO UNDERLYING CONDITION WITH FOOT ULCER, UNSPECIFIED LONG TERM INSULIN USE STATUS: Primary | ICD-10-CM

## 2018-03-16 DIAGNOSIS — E08.621 DIABETES MELLITUS DUE TO UNDERLYING CONDITION WITH FOOT ULCER, UNSPECIFIED LONG TERM INSULIN USE STATUS: Primary | ICD-10-CM

## 2018-03-21 ENCOUNTER — CARE COORDINATION (OUTPATIENT)
Dept: CARE COORDINATION | Age: 68
End: 2018-03-21

## 2018-03-21 NOTE — CARE COORDINATION
6/29/17   Natalia Faust, DO   Insulin Pen Needle 32G X 4 MM MISC 1 each by Does not apply route daily 5/26/17   Natalia Faust, DO   polysacchar iron-FA-B12 (FERREX 150 FORTE) 150-1-25 MG-MG-MCG CAPS capsule TAKE (1) CAPSULE TWICE A DAY 5/26/17   Natalia Faust, DO   levothyroxine (SYNTHROID) 50 MCG tablet Take 1 tablet by mouth daily 5/26/17   Natalia Faust, DO   atorvastatin (LIPITOR) 10 MG tablet Take 1 tablet by mouth daily 5/26/17   Natalia Faust, DO   metoclopramide (REGLAN) 5 MG tablet Take 1 tablet by mouth 4 times daily 5/26/17   Natalia Faust, DO   folic acid (FOLVITE) 942 MCG tablet Take 1 tablet by mouth daily 5/26/17   Natalia Faust, DO   aspirin (ASPIRIN LOW DOSE) 81 MG EC tablet Take 1 tablet by mouth daily 5/26/17   Natalia Faust, DO   dorzolamide-timolol (COSOPT) 22.3-6.8 MG/ML ophthalmic solution  11/30/15   Historical Provider, MD   travoprost, benzalkonium, (TRAVATAN) 0.004 % ophthalmic solution  12/9/15   Historical Provider, MD       Future Appointments  Date Time Provider Armaan Green   4/9/2018 10:45 AM Gerard Ribeiro, Vinay Cheng

## 2018-04-02 ENCOUNTER — TELEPHONE (OUTPATIENT)
Dept: FAMILY MEDICINE CLINIC | Age: 68
End: 2018-04-02

## 2018-04-04 ENCOUNTER — CARE COORDINATION (OUTPATIENT)
Dept: CARE COORDINATION | Age: 68
End: 2018-04-04

## 2018-04-09 ENCOUNTER — OFFICE VISIT (OUTPATIENT)
Dept: FAMILY MEDICINE CLINIC | Age: 68
End: 2018-04-09
Payer: MEDICARE

## 2018-04-09 VITALS
SYSTOLIC BLOOD PRESSURE: 138 MMHG | WEIGHT: 181 LBS | BODY MASS INDEX: 26.81 KG/M2 | HEIGHT: 69 IN | RESPIRATION RATE: 16 BRPM | HEART RATE: 78 BPM | DIASTOLIC BLOOD PRESSURE: 80 MMHG

## 2018-04-09 DIAGNOSIS — I10 ESSENTIAL HYPERTENSION: ICD-10-CM

## 2018-04-09 DIAGNOSIS — K21.00 GASTROESOPHAGEAL REFLUX DISEASE WITH ESOPHAGITIS: ICD-10-CM

## 2018-04-09 DIAGNOSIS — E11.65 TYPE 2 DIABETES MELLITUS WITH HYPERGLYCEMIA, WITH LONG-TERM CURRENT USE OF INSULIN (HCC): Primary | ICD-10-CM

## 2018-04-09 DIAGNOSIS — E11.42 DIABETIC POLYNEUROPATHY ASSOCIATED WITH TYPE 2 DIABETES MELLITUS (HCC): ICD-10-CM

## 2018-04-09 DIAGNOSIS — R60.0 EDEMA OF BOTH LEGS: ICD-10-CM

## 2018-04-09 DIAGNOSIS — L97.529 DIABETIC ULCER OF BOTH FEET ASSOCIATED WITH DIABETES MELLITUS DUE TO UNDERLYING CONDITION (HCC): ICD-10-CM

## 2018-04-09 DIAGNOSIS — L97.519 DIABETIC ULCER OF BOTH FEET ASSOCIATED WITH DIABETES MELLITUS DUE TO UNDERLYING CONDITION (HCC): ICD-10-CM

## 2018-04-09 DIAGNOSIS — E55.9 VITAMIN D DEFICIENCY: ICD-10-CM

## 2018-04-09 DIAGNOSIS — Z79.4 TYPE 2 DIABETES MELLITUS WITH HYPERGLYCEMIA, WITH LONG-TERM CURRENT USE OF INSULIN (HCC): Primary | ICD-10-CM

## 2018-04-09 DIAGNOSIS — Z79.4 TYPE 2 DIABETES MELLITUS WITH HYPERGLYCEMIA, WITH LONG-TERM CURRENT USE OF INSULIN (HCC): ICD-10-CM

## 2018-04-09 DIAGNOSIS — E11.65 TYPE 2 DIABETES MELLITUS WITH HYPERGLYCEMIA, WITH LONG-TERM CURRENT USE OF INSULIN (HCC): ICD-10-CM

## 2018-04-09 DIAGNOSIS — Z11.59 NEED FOR HEPATITIS C SCREENING TEST: ICD-10-CM

## 2018-04-09 DIAGNOSIS — E78.00 PURE HYPERCHOLESTEROLEMIA: ICD-10-CM

## 2018-04-09 DIAGNOSIS — E08.621 DIABETIC ULCER OF BOTH FEET ASSOCIATED WITH DIABETES MELLITUS DUE TO UNDERLYING CONDITION (HCC): ICD-10-CM

## 2018-04-09 DIAGNOSIS — Z12.31 SCREENING MAMMOGRAM, ENCOUNTER FOR: ICD-10-CM

## 2018-04-09 LAB — HBA1C MFR BLD: 7.9 %

## 2018-04-09 PROCEDURE — 3017F COLORECTAL CA SCREEN DOC REV: CPT | Performed by: FAMILY MEDICINE

## 2018-04-09 PROCEDURE — G8417 CALC BMI ABV UP PARAM F/U: HCPCS | Performed by: FAMILY MEDICINE

## 2018-04-09 PROCEDURE — G8427 DOCREV CUR MEDS BY ELIG CLIN: HCPCS | Performed by: FAMILY MEDICINE

## 2018-04-09 PROCEDURE — 83036 HEMOGLOBIN GLYCOSYLATED A1C: CPT | Performed by: FAMILY MEDICINE

## 2018-04-09 PROCEDURE — 1123F ACP DISCUSS/DSCN MKR DOCD: CPT | Performed by: FAMILY MEDICINE

## 2018-04-09 PROCEDURE — 1036F TOBACCO NON-USER: CPT | Performed by: FAMILY MEDICINE

## 2018-04-09 PROCEDURE — 3014F SCREEN MAMMO DOC REV: CPT | Performed by: FAMILY MEDICINE

## 2018-04-09 PROCEDURE — 3045F PR MOST RECENT HEMOGLOBIN A1C LEVEL 7.0-9.0%: CPT | Performed by: FAMILY MEDICINE

## 2018-04-09 PROCEDURE — 1090F PRES/ABSN URINE INCON ASSESS: CPT | Performed by: FAMILY MEDICINE

## 2018-04-09 PROCEDURE — 99214 OFFICE O/P EST MOD 30 MIN: CPT | Performed by: FAMILY MEDICINE

## 2018-04-09 PROCEDURE — G8400 PT W/DXA NO RESULTS DOC: HCPCS | Performed by: FAMILY MEDICINE

## 2018-04-09 PROCEDURE — 4040F PNEUMOC VAC/ADMIN/RCVD: CPT | Performed by: FAMILY MEDICINE

## 2018-04-09 RX ORDER — TRAVOPROST 0.004 %
DROPS OPHTHALMIC (EYE)
COMMUNITY
Start: 2018-03-30

## 2018-04-09 RX ORDER — ACETAZOLAMIDE 500 MG/1
CAPSULE, EXTENDED RELEASE ORAL
COMMUNITY
Start: 2018-03-30

## 2018-04-09 RX ORDER — PILOCARPINE HYDROCHLORIDE 20 MG/ML
SOLUTION/ DROPS OPHTHALMIC
COMMUNITY
Start: 2018-02-08 | End: 2019-02-06 | Stop reason: SDUPTHER

## 2018-04-09 RX ORDER — PILOCARPINE HYDROCHLORIDE 10 MG/ML
SOLUTION/ DROPS OPHTHALMIC
COMMUNITY
Start: 2018-02-05 | End: 2022-06-29

## 2018-04-09 ASSESSMENT — PATIENT HEALTH QUESTIONNAIRE - PHQ9
SUM OF ALL RESPONSES TO PHQ9 QUESTIONS 1 & 2: 0
2. FEELING DOWN, DEPRESSED OR HOPELESS: 0
1. LITTLE INTEREST OR PLEASURE IN DOING THINGS: 0
SUM OF ALL RESPONSES TO PHQ QUESTIONS 1-9: 0

## 2018-04-10 ENCOUNTER — CARE COORDINATION (OUTPATIENT)
Dept: CARE COORDINATION | Age: 68
End: 2018-04-10

## 2018-04-10 RX ORDER — PEN NEEDLE, DIABETIC 32GX 5/32"
NEEDLE, DISPOSABLE MISCELLANEOUS
Qty: 90 EACH | Refills: 3 | Status: SHIPPED | OUTPATIENT
Start: 2018-04-10 | End: 2019-09-25 | Stop reason: SDUPTHER

## 2018-05-01 ENCOUNTER — CARE COORDINATION (OUTPATIENT)
Dept: CARE COORDINATION | Age: 68
End: 2018-05-01

## 2018-05-15 DIAGNOSIS — Z79.4 TYPE 2 DIABETES MELLITUS WITH HYPERGLYCEMIA, WITH LONG-TERM CURRENT USE OF INSULIN (HCC): ICD-10-CM

## 2018-05-15 DIAGNOSIS — E11.65 TYPE 2 DIABETES MELLITUS WITH HYPERGLYCEMIA, WITH LONG-TERM CURRENT USE OF INSULIN (HCC): ICD-10-CM

## 2018-05-22 ENCOUNTER — CARE COORDINATION (OUTPATIENT)
Dept: CARE COORDINATION | Age: 68
End: 2018-05-22

## 2018-05-23 DIAGNOSIS — E11.65 TYPE 2 DIABETES MELLITUS WITH HYPERGLYCEMIA, WITH LONG-TERM CURRENT USE OF INSULIN (HCC): ICD-10-CM

## 2018-05-23 DIAGNOSIS — Z79.4 TYPE 2 DIABETES MELLITUS WITH HYPERGLYCEMIA, WITH LONG-TERM CURRENT USE OF INSULIN (HCC): ICD-10-CM

## 2018-05-23 RX ORDER — INSULIN GLARGINE 300 U/ML
INJECTION, SOLUTION SUBCUTANEOUS
Qty: 4.5 ML | Refills: 0 | Status: SHIPPED | OUTPATIENT
Start: 2018-05-23 | End: 2018-06-27 | Stop reason: SDUPTHER

## 2018-06-05 ENCOUNTER — CARE COORDINATION (OUTPATIENT)
Dept: CARE COORDINATION | Age: 68
End: 2018-06-05

## 2018-06-12 RX ORDER — BLOOD SUGAR DIAGNOSTIC
STRIP MISCELLANEOUS
Qty: 300 STRIP | Refills: 3 | Status: SHIPPED | OUTPATIENT
Start: 2018-06-12 | End: 2020-02-13 | Stop reason: SDUPTHER

## 2018-06-19 ENCOUNTER — CARE COORDINATION (OUTPATIENT)
Dept: CARE COORDINATION | Age: 68
End: 2018-06-19

## 2018-06-20 LAB
ALBUMIN SERPL-MCNC: 4.1 G/DL (ref 3.5–5.2)
ALK PHOSPHATASE: 87 U/L (ref 30–134)
ALT SERPL-CCNC: 17 U/L (ref 5–40)
ANION GAP SERPL CALCULATED.3IONS-SCNC: 12 MEQ/L (ref 10–19)
AST SERPL-CCNC: 16 U/L (ref 9–40)
BILIRUB SERPL-MCNC: 0.7 MG/DL
BUN BLDV-MCNC: 30 MG/DL (ref 8–23)
CALCIUM SERPL-MCNC: 9.1 MG/DL (ref 8.5–10.5)
CHLORIDE BLD-SCNC: 106 MEQ/L (ref 95–107)
CHOLESTEROL/HDL RATIO: 3.7
CHOLESTEROL: 190 MG/DL
CO2: 24 MEQ/L (ref 19–31)
CREAT SERPL-MCNC: 1.1 MG/DL (ref 0.6–1.3)
EGFR AFRICAN AMERICAN: 60.6 ML/MIN/1.73 M2
EGFR IF NONAFRICAN AMERICAN: 52.3 ML/MIN/1.73 M2
GLUCOSE: 157 MG/DL (ref 70–99)
HDLC SERPL-MCNC: 51 MG/DL
HEPATITIS C ANTIBODY: NEGATIVE
LDL CHOLESTEROL CALCULATED: 121 MG/DL
LDL/HDL RATIO: 2.4
POTASSIUM SERPL-SCNC: 5.1 MEQ/L (ref 3.5–5.4)
SODIUM BLD-SCNC: 142 MEQ/L (ref 135–146)
T4 FREE: 0.91 NG/DL (ref 0.8–1.9)
TOTAL PROTEIN: 6.7 G/DL (ref 6.1–8.3)
TRIGL SERPL-MCNC: 92 MG/DL
TSH SERPL DL<=0.05 MIU/L-ACNC: 0.46 UIU/ML (ref 0.4–4.1)
VLDLC SERPL CALC-MCNC: 18 MG/DL

## 2018-06-21 LAB
ABSOLUTE BASO #: 0 K/UL (ref 0–0.1)
ABSOLUTE EOS #: 0.2 K/UL (ref 0.1–0.4)
ABSOLUTE LYMPH #: 1.7 K/UL (ref 0.8–5.2)
ABSOLUTE MONO #: 0.3 K/UL (ref 0.1–0.9)
ABSOLUTE NEUT #: 2.3 K/UL (ref 1.3–9.1)
BASOPHILS RELATIVE PERCENT: 0.7 %
EOSINOPHILS RELATIVE PERCENT: 3.9 %
HCT VFR BLD CALC: 35.9 % (ref 36–48)
HEMOGLOBIN: 11.5 G/DL (ref 12–16)
LYMPHOCYTE %: 36.8 %
MCH RBC QN AUTO: 27.8 PG (ref 27–34)
MCHC RBC AUTO-ENTMCNC: 32 G/DL (ref 31–36)
MCV RBC AUTO: 86.9 FL (ref 80–100)
MONOCYTES # BLD: 7.2 %
NEUTROPHILS RELATIVE PERCENT: 51.2 %
PDW BLD-RTO: 12.6 % (ref 10.8–14.8)
PLATELETS: 215 K/UL (ref 150–450)
RBC: 4.13 M/UL (ref 4–5.5)
WBC: 4.6 K/UL (ref 3.7–10.8)

## 2018-06-22 LAB
ALBUMIN SERPL-MCNC: NORMAL G/DL
ALP BLD-CCNC: NORMAL U/L
ALT SERPL-CCNC: NORMAL U/L
ANION GAP SERPL CALCULATED.3IONS-SCNC: NORMAL MMOL/L
ANTIBODY: NORMAL
AST SERPL-CCNC: NORMAL U/L
BASOPHILS ABSOLUTE: NORMAL /ΜL
BASOPHILS RELATIVE PERCENT: NORMAL %
BILIRUB SERPL-MCNC: NORMAL MG/DL (ref 0.1–1.4)
BUN BLDV-MCNC: NORMAL MG/DL
CALCIUM SERPL-MCNC: NORMAL MG/DL
CHLORIDE BLD-SCNC: NORMAL MMOL/L
CHOLESTEROL, TOTAL: 190 MG/DL
CHOLESTEROL/HDL RATIO: 3.7
CO2: NORMAL MMOL/L
CREAT SERPL-MCNC: NORMAL MG/DL
EOSINOPHILS ABSOLUTE: NORMAL /ΜL
EOSINOPHILS RELATIVE PERCENT: NORMAL %
GFR CALCULATED: NORMAL
GLUCOSE BLD-MCNC: NORMAL MG/DL
HCT VFR BLD CALC: NORMAL % (ref 36–46)
HDLC SERPL-MCNC: 51 MG/DL (ref 35–70)
HEMOGLOBIN: NORMAL G/DL (ref 12–16)
LDL CHOLESTEROL CALCULATED: 121 MG/DL (ref 0–160)
LYMPHOCYTES ABSOLUTE: NORMAL /ΜL
LYMPHOCYTES RELATIVE PERCENT: NORMAL %
MCH RBC QN AUTO: NORMAL PG
MCHC RBC AUTO-ENTMCNC: NORMAL G/DL
MCV RBC AUTO: NORMAL FL
MONOCYTES ABSOLUTE: NORMAL /ΜL
MONOCYTES RELATIVE PERCENT: NORMAL %
NEUTROPHILS ABSOLUTE: NORMAL /ΜL
NEUTROPHILS RELATIVE PERCENT: NORMAL %
PARATHYROID HORMONE INTACT: 52 PG/ML (ref 11–67)
PDW BLD-RTO: NORMAL %
PLATELET # BLD: NORMAL K/ΜL
PMV BLD AUTO: NORMAL FL
POTASSIUM SERPL-SCNC: NORMAL MMOL/L
PTH INTACT: NORMAL
RBC # BLD: NORMAL 10^6/ΜL
SODIUM BLD-SCNC: NORMAL MMOL/L
T4 FREE: NORMAL
THYROID PEROXIDASE ANTIBODY: 1 IU/ML
TOTAL PROTEIN: NORMAL
TRIGL SERPL-MCNC: 92 MG/DL
TSH SERPL DL<=0.05 MIU/L-ACNC: NORMAL UIU/ML
VITAMIN D 25-HYDROXY: 26 NG/ML
VITAMIN D 25-HYDROXY: NORMAL
VITAMIN D2, 25 HYDROXY: NORMAL
VITAMIN D3,25 HYDROXY: NORMAL
VLDLC SERPL CALC-MCNC: 18 MG/DL
WBC # BLD: NORMAL 10^3/ML

## 2018-06-25 DIAGNOSIS — E78.00 PURE HYPERCHOLESTEROLEMIA: ICD-10-CM

## 2018-06-25 DIAGNOSIS — L97.519 DIABETIC ULCER OF BOTH FEET ASSOCIATED WITH DIABETES MELLITUS DUE TO UNDERLYING CONDITION (HCC): ICD-10-CM

## 2018-06-25 DIAGNOSIS — E55.9 VITAMIN D DEFICIENCY: ICD-10-CM

## 2018-06-25 DIAGNOSIS — E11.65 TYPE 2 DIABETES MELLITUS WITH HYPERGLYCEMIA, WITH LONG-TERM CURRENT USE OF INSULIN (HCC): ICD-10-CM

## 2018-06-25 DIAGNOSIS — Z79.4 TYPE 2 DIABETES MELLITUS WITH HYPERGLYCEMIA, WITH LONG-TERM CURRENT USE OF INSULIN (HCC): ICD-10-CM

## 2018-06-25 DIAGNOSIS — E11.42 DIABETIC POLYNEUROPATHY ASSOCIATED WITH TYPE 2 DIABETES MELLITUS (HCC): ICD-10-CM

## 2018-06-25 DIAGNOSIS — L97.529 DIABETIC ULCER OF BOTH FEET ASSOCIATED WITH DIABETES MELLITUS DUE TO UNDERLYING CONDITION (HCC): ICD-10-CM

## 2018-06-25 DIAGNOSIS — Z11.59 NEED FOR HEPATITIS C SCREENING TEST: ICD-10-CM

## 2018-06-25 DIAGNOSIS — E08.621 DIABETIC ULCER OF BOTH FEET ASSOCIATED WITH DIABETES MELLITUS DUE TO UNDERLYING CONDITION (HCC): ICD-10-CM

## 2018-06-25 DIAGNOSIS — R60.0 EDEMA OF BOTH LEGS: ICD-10-CM

## 2018-06-25 DIAGNOSIS — K21.00 GASTROESOPHAGEAL REFLUX DISEASE WITH ESOPHAGITIS: ICD-10-CM

## 2018-06-25 DIAGNOSIS — I10 ESSENTIAL HYPERTENSION: ICD-10-CM

## 2018-06-27 DIAGNOSIS — E11.65 TYPE 2 DIABETES MELLITUS WITH HYPERGLYCEMIA, WITH LONG-TERM CURRENT USE OF INSULIN (HCC): ICD-10-CM

## 2018-06-27 DIAGNOSIS — Z79.4 TYPE 2 DIABETES MELLITUS WITH HYPERGLYCEMIA, WITH LONG-TERM CURRENT USE OF INSULIN (HCC): ICD-10-CM

## 2018-06-27 RX ORDER — INSULIN GLARGINE 300 U/ML
INJECTION, SOLUTION SUBCUTANEOUS
Qty: 4.5 ML | Refills: 2 | Status: SHIPPED | OUTPATIENT
Start: 2018-06-27 | End: 2018-09-25 | Stop reason: SDUPTHER

## 2018-07-03 ENCOUNTER — CARE COORDINATION (OUTPATIENT)
Dept: CARE COORDINATION | Age: 68
End: 2018-07-03

## 2018-07-10 ENCOUNTER — CARE COORDINATION (OUTPATIENT)
Dept: CARE COORDINATION | Age: 68
End: 2018-07-10

## 2018-07-24 ENCOUNTER — CARE COORDINATION (OUTPATIENT)
Dept: CARE COORDINATION | Age: 68
End: 2018-07-24

## 2018-07-24 NOTE — CARE COORDINATION
Natalia Faust, DO   BD PEN NEEDLE DELANEY U/F 32G X 4 MM MISC USE  DAILY 4/10/18   Natalia Faust, DO   acetaZOLAMIDE (DIAMOX) 500 MG extended release capsule  3/30/18   Historical Provider, MD   pilocarpine (PILOCAR) 1 % ophthalmic solution  2/5/18   Historical Provider, MD   pilocarpine (PILOCAR) 2 % ophthalmic solution  2/8/18   Historical Provider, MD   TRAVATAN Z 0.004 % SOLN ophthalmic solution  3/30/18   Historical Provider, MD   Alcohol Swabs (B-D SINGLE USE SWABS REGULAR) PADS USE THREE TIMES DAILY 2/7/18   Natalia Faust, DO   omeprazole (PRILOSEC) 40 MG delayed release capsule Take 1 capsule by mouth Daily 1/8/18   Natalia Faust, DO   guaiFENesin (MUCINEX) 600 MG extended release tablet Take 1 tablet by mouth 2 times daily 1/8/18   Natalia Faust, DO   potassium chloride (KLOR-CON M) 20 MEQ extended release tablet TAKE 1 TABLET EVERY DAY 12/11/17   Natalia Faust, DO   furosemide (LASIX) 40 MG tablet TAKE 1 TABLET EVERY DAY 12/11/17   Natalia Faust, DO   brimonidine (ALPHAGAN) 0.2 % ophthalmic solution Place 1 drop into both eyes 2 times daily 9/25/17   Natalia Faust, DO   Accu-Chek Softclix Lancets MISC TEST THREE TIMES DAILY 8/30/17   Natalia Faust, DO   polysacchar iron-FA-B12 (FERREX 150 FORTE) 150-1-25 MG-MG-MCG CAPS capsule TAKE (1) CAPSULE TWICE A DAY 5/26/17   Natalia Faust, DO   levothyroxine (SYNTHROID) 50 MCG tablet Take 1 tablet by mouth daily 5/26/17   Natalia Faust, DO   atorvastatin (LIPITOR) 10 MG tablet Take 1 tablet by mouth daily 5/26/17   Natalia Faust, DO   metoclopramide (REGLAN) 5 MG tablet Take 1 tablet by mouth 4 times daily 5/26/17   Natalia Faust, DO   folic acid (FOLVITE) 880 MCG tablet Take 1 tablet by mouth daily 5/26/17   Natalia Faust, DO   aspirin (ASPIRIN LOW DOSE) 81 MG EC tablet Take 1 tablet by mouth daily 5/26/17   Natalia Faust DO   dorzolamide-timolol (COSOPT) 22.3-6.8 MG/ML ophthalmic solution  11/30/15   Historical Provider, MD       Future Appointments  Date Time Provider Armaan Green   7/26/2018 10:45 AM STA SCR MAMMO RM 2 [facility]MO [facility]

## 2018-07-26 ENCOUNTER — HOSPITAL ENCOUNTER (OUTPATIENT)
Dept: MAMMOGRAPHY | Age: 68
Discharge: HOME | End: 2018-07-28
Payer: MEDICARE

## 2018-07-26 DIAGNOSIS — Z12.31 SCREENING MAMMOGRAM, ENCOUNTER FOR: ICD-10-CM

## 2018-07-26 PROCEDURE — 77063 BREAST TOMOSYNTHESIS BI: CPT

## 2018-07-30 RX ORDER — METOCLOPRAMIDE 5 MG/1
TABLET ORAL
Qty: 120 TABLET | Refills: 5 | Status: SHIPPED | OUTPATIENT
Start: 2018-07-30 | End: 2019-02-06

## 2018-07-30 RX ORDER — LEVOTHYROXINE SODIUM 0.05 MG/1
50 TABLET ORAL DAILY
Qty: 30 TABLET | Refills: 1 | Status: SHIPPED | OUTPATIENT
Start: 2018-07-30 | End: 2019-02-06 | Stop reason: SDUPTHER

## 2018-07-30 RX ORDER — ATORVASTATIN CALCIUM 10 MG/1
10 TABLET, FILM COATED ORAL DAILY
Qty: 30 TABLET | Refills: 11 | Status: SHIPPED | OUTPATIENT
Start: 2018-07-30 | End: 2019-02-06 | Stop reason: SDUPTHER

## 2018-07-30 RX ORDER — IRON PS COMPLEX/B12/FOLIC ACID 150-25-1
CAPSULE ORAL
Qty: 30 CAPSULE | Refills: 11 | Status: SHIPPED | OUTPATIENT
Start: 2018-07-30

## 2018-07-31 ENCOUNTER — OFFICE VISIT (OUTPATIENT)
Dept: FAMILY MEDICINE CLINIC | Age: 68
End: 2018-07-31
Payer: MEDICARE

## 2018-07-31 ENCOUNTER — HOSPITAL ENCOUNTER (OUTPATIENT)
Age: 68
Setting detail: SPECIMEN
Discharge: HOME | End: 2018-07-31
Payer: MEDICARE

## 2018-07-31 VITALS
HEIGHT: 69 IN | RESPIRATION RATE: 16 BRPM | BODY MASS INDEX: 25.48 KG/M2 | WEIGHT: 172 LBS | DIASTOLIC BLOOD PRESSURE: 72 MMHG | HEART RATE: 63 BPM | TEMPERATURE: 97.3 F | SYSTOLIC BLOOD PRESSURE: 115 MMHG

## 2018-07-31 DIAGNOSIS — E78.00 PURE HYPERCHOLESTEROLEMIA: ICD-10-CM

## 2018-07-31 DIAGNOSIS — I10 ESSENTIAL HYPERTENSION: ICD-10-CM

## 2018-07-31 DIAGNOSIS — Z79.4 TYPE 2 DIABETES MELLITUS WITH HYPERGLYCEMIA, WITH LONG-TERM CURRENT USE OF INSULIN (HCC): Primary | ICD-10-CM

## 2018-07-31 DIAGNOSIS — E11.65 TYPE 2 DIABETES MELLITUS WITH HYPERGLYCEMIA, WITH LONG-TERM CURRENT USE OF INSULIN (HCC): Primary | ICD-10-CM

## 2018-07-31 LAB — HBA1C MFR BLD: 7.2 %

## 2018-07-31 PROCEDURE — 1090F PRES/ABSN URINE INCON ASSESS: CPT | Performed by: FAMILY MEDICINE

## 2018-07-31 PROCEDURE — 1123F ACP DISCUSS/DSCN MKR DOCD: CPT | Performed by: FAMILY MEDICINE

## 2018-07-31 PROCEDURE — G8427 DOCREV CUR MEDS BY ELIG CLIN: HCPCS | Performed by: FAMILY MEDICINE

## 2018-07-31 PROCEDURE — G8417 CALC BMI ABV UP PARAM F/U: HCPCS | Performed by: FAMILY MEDICINE

## 2018-07-31 PROCEDURE — 4040F PNEUMOC VAC/ADMIN/RCVD: CPT | Performed by: FAMILY MEDICINE

## 2018-07-31 PROCEDURE — 3017F COLORECTAL CA SCREEN DOC REV: CPT | Performed by: FAMILY MEDICINE

## 2018-07-31 PROCEDURE — 99214 OFFICE O/P EST MOD 30 MIN: CPT | Performed by: FAMILY MEDICINE

## 2018-07-31 PROCEDURE — G8400 PT W/DXA NO RESULTS DOC: HCPCS | Performed by: FAMILY MEDICINE

## 2018-07-31 PROCEDURE — 2022F DILAT RTA XM EVC RTNOPTHY: CPT | Performed by: FAMILY MEDICINE

## 2018-07-31 PROCEDURE — 1036F TOBACCO NON-USER: CPT | Performed by: FAMILY MEDICINE

## 2018-07-31 PROCEDURE — 83036 HEMOGLOBIN GLYCOSYLATED A1C: CPT | Performed by: FAMILY MEDICINE

## 2018-07-31 PROCEDURE — 1101F PT FALLS ASSESS-DOCD LE1/YR: CPT | Performed by: FAMILY MEDICINE

## 2018-07-31 PROCEDURE — 3045F PR MOST RECENT HEMOGLOBIN A1C LEVEL 7.0-9.0%: CPT | Performed by: FAMILY MEDICINE

## 2018-07-31 ASSESSMENT — PATIENT HEALTH QUESTIONNAIRE - PHQ9
SUM OF ALL RESPONSES TO PHQ9 QUESTIONS 1 & 2: 0
2. FEELING DOWN, DEPRESSED OR HOPELESS: 0
SUM OF ALL RESPONSES TO PHQ QUESTIONS 1-9: 0
1. LITTLE INTEREST OR PLEASURE IN DOING THINGS: 0

## 2018-07-31 NOTE — PROGRESS NOTES
PCV13) 03/21/2017    Diabetic foot exam  07/10/2018    Diabetic microalbuminuria test  07/10/2018    Flu vaccine (1) 09/01/2018    A1C test (Diabetic or Prediabetic)  04/09/2019    Lipid screen  06/20/2019    Potassium monitoring  06/20/2019    Creatinine monitoring  06/20/2019    Breast cancer screen  07/26/2020    Hepatitis C screen  Completed

## 2018-07-31 NOTE — PROGRESS NOTES
[facility] PHYSICIANS  COMPREHENSIVE CARE  [facility] [address] [facility] [address]  Dept: [phone]      Reeves Siemens is a 79 y.o. female who presents today for follow up on her  medical conditions as noted below. No chief complaint on file. There is no problem list on file for this patient.      Past Medical History:   Diagnosis Date    Type II or unspecified type diabetes mellitus without mention of complication, not stated as uncontrolled       Past Surgical History:   Procedure Laterality Date    FOOT SURGERY  +10years    R foot      Family History   Problem Relation Age of Onset    Diabetes Mother     Diabetes Brother        Current Outpatient Prescriptions   Medication Sig Dispense Refill    metoclopramide (REGLAN) 5 MG tablet TAKE 1 TABLET BY MOUTH FOUR TIMES DAILY 120 tablet 5    atorvastatin (LIPITOR) 10 MG tablet TAKE 1 TABLET BY MOUTH DAILY 30 tablet 11    levothyroxine (SYNTHROID) 50 MCG tablet TAKE 1 TABLET BY MOUTH DAILY 30 tablet 1    FERREX 150 FORTE 150-1-25 MG-MG-MCG CAPS capsule TAKE 1 CAPSULE BY MOUTH TWICE DAILY 30 capsule 11    TOUJEO SOLOSTAR 300 UNIT/ML injection pen INJECT 50 UNITS SUBCUTANEOUSLY AT BEDTIME 4.5 mL 2    ACCU-CHEK MAMI PLUS strip TEST THREE TIMES DAILY AS NEEDED 300 strip 3    insulin glargine (TOUJEO SOLOSTAR) 300 UNIT/ML injection pen Inject 60 Units into the skin nightly (Patient taking differently: Inject 50 Units into the skin nightly ) 1 pen 0    BD PEN NEEDLE DELANEY U/F 32G X 4 MM MISC USE  DAILY 90 each 3    acetaZOLAMIDE (DIAMOX) 500 MG extended release capsule       pilocarpine (PILOCAR) 1 % ophthalmic solution       pilocarpine (PILOCAR) 2 % ophthalmic solution       TRAVATAN Z 0.004 % SOLN ophthalmic solution       Alcohol Swabs (B-D SINGLE USE SWABS REGULAR) PADS USE THREE TIMES DAILY 300 each 2    omeprazole (PRILOSEC) 40 MG delayed release capsule Take 1 capsule by mouth Daily 30 capsule 11    guaiFENesin (MUCINEX) Negative for fever, appetite change and fatigue. Family social and medical history reviewed and unchanged     HENT: Negative. Negative for nosebleeds, trouble swallowing and neck pain. Eyes: Negative for photophobia and visual disturbance. Respiratory: Negative. Negative for chest tightness and shortness of breath. Cardiovascular: Negative. Negative for chest pain and leg swelling. Gastrointestinal: Negative. Negative for abdominal pain and blood in stool. Endocrine: Negative for cold intolerance and polyuria. Genitourinary: Negative for dysuria and hematuria. Musculoskeletal: Negative. Skin: Negative for rash. Allergic/Immunologic: Negative. Neurological: Negative. Negative for dizziness, weakness and numbness. Hematological: Negative. Negative for adenopathy. Does not bruise/bleed easily. Psychiatric/Behavioral: Negative for sleep disturbance, dysphoric mood and  decreased concentration. The patient is not nervous/anxious. Objective:     Physical Exam:     Nursing note and vitals reviewed. /72   Pulse 63   Temp 97.3 °F (36.3 °C) (Oral)   Resp 16   Ht 5' 9\" (1.753 m)   Wt 172 lb (78 kg)   BMI 25.40 kg/m²   Constitutional: She is oriented to person, place, and time. She   appears well-developed and well-nourished. HENT:   Head: Normocephalic and atraumatic. Right Ear: External ear normal. Tympanic membrane is not erythematous. No middle ear effusion. Left Ear: External ear normal. Tympanic membrane is not erythematous. No middle ear effusion. Nose: No mucosal edema. Mouth/Throat: Oropharynx is clear and moist. No posterior oropharyngeal erythema. Eyes: Conjunctivae and EOM are normal. Pupils are equal, round, and reactive to light. Neck: Normal range of motion. Neck supple. No thyromegaly present. Cardiovascular: Normal rate, regular rhythm and normal heart sounds. No murmur heard.   Pulmonary/Chest: Effort normal and breath sounds

## 2018-08-02 LAB
CREATININE URINE: 35.4 MG/DL (ref 28–217)
MICROALBUMIN/CREAT 24H UR: <12 MG/L
MICROALBUMIN/CREAT UR-RTO: NORMAL MCG/MG CREAT
PHYSICIAN ID COMMENT: NORMAL
PHYSICIAN: NORMAL
ZZ NTE CLEAN UP: ORDERED TEST: NORMAL
ZZ NTE WITH NAME CLEAN UP: SPECIMEN SOURCE: NORMAL

## 2018-08-07 ENCOUNTER — CARE COORDINATION (OUTPATIENT)
Dept: CARE COORDINATION | Age: 68
End: 2018-08-07

## 2018-08-14 ENCOUNTER — CARE COORDINATION (OUTPATIENT)
Dept: CARE COORDINATION | Age: 68
End: 2018-08-14

## 2018-08-14 NOTE — CARE COORDINATION
THREE TIMES DAILY AS NEEDED 6/12/18   Natalia Faust DO   insulin glargine (TOUJEO SOLOSTAR) 300 UNIT/ML injection pen Inject 60 Units into the skin nightly  Patient taking differently: Inject 50 Units into the skin nightly  5/15/18   Natalia Faust DO   BD PEN NEEDLE DELANEY U/F 32G X 4 MM MISC USE  DAILY 4/10/18   Natalia Faust DO   acetaZOLAMIDE (DIAMOX) 500 MG extended release capsule  3/30/18   Historical Provider, MD   pilocarpine (PILOCAR) 1 % ophthalmic solution  2/5/18   Historical Provider, MD   pilocarpine (PILOCAR) 2 % ophthalmic solution  2/8/18   Historical Provider, MD   TRAVATAN Z 0.004 % SOLN ophthalmic solution  3/30/18   Historical Provider, MD   Alcohol Swabs (B-D SINGLE USE SWABS REGULAR) PADS USE THREE TIMES DAILY 2/7/18   Natalia Faust DO   omeprazole (PRILOSEC) 40 MG delayed release capsule Take 1 capsule by mouth Daily 1/8/18   Natalia Faust DO   guaiFENesin (MUCINEX) 600 MG extended release tablet Take 1 tablet by mouth 2 times daily 1/8/18   Natalia Faust DO   potassium chloride (KLOR-CON M) 20 MEQ extended release tablet TAKE 1 TABLET EVERY DAY 12/11/17   Natalia Faust, DO   furosemide (LASIX) 40 MG tablet TAKE 1 TABLET EVERY DAY 12/11/17   Natalia Faust DO   brimonidine (ALPHAGAN) 0.2 % ophthalmic solution Place 1 drop into both eyes 2 times daily 9/25/17   Natalia Faust, DO   Accu-Chek Softclix Lancets MISC TEST THREE TIMES DAILY 8/30/17   Natalia Faust DO   folic acid (FOLVITE) 133 MCG tablet Take 1 tablet by mouth daily 5/26/17   Natalia Faust, DO   aspirin (ASPIRIN LOW DOSE) 81 MG EC tablet Take 1 tablet by mouth daily 5/26/17   Natalia Faust, DO   dorzolamide-timolol (COSOPT) 22.3-6.8 MG/ML ophthalmic solution  11/30/15   Historical Provider, MD       No future appointments.

## 2018-09-04 ENCOUNTER — CARE COORDINATION (OUTPATIENT)
Dept: CARE COORDINATION | Age: 68
End: 2018-09-04

## 2018-09-04 NOTE — CARE COORDINATION
with adherence to non-pharmacologic self-management interventions?  (Nutrition/Exercise/Self-Monitoring):  No   Have you ever had to go to the ED for symptoms of low blood sugar?:  No       No patient-reported symptoms

## 2018-09-24 ENCOUNTER — CARE COORDINATION (OUTPATIENT)
Dept: CARE COORDINATION | Age: 68
End: 2018-09-24

## 2018-09-25 DIAGNOSIS — E11.65 TYPE 2 DIABETES MELLITUS WITH HYPERGLYCEMIA, WITH LONG-TERM CURRENT USE OF INSULIN (HCC): ICD-10-CM

## 2018-09-25 DIAGNOSIS — Z79.4 TYPE 2 DIABETES MELLITUS WITH HYPERGLYCEMIA, WITH LONG-TERM CURRENT USE OF INSULIN (HCC): ICD-10-CM

## 2018-09-26 RX ORDER — INSULIN GLARGINE 300 U/ML
INJECTION, SOLUTION SUBCUTANEOUS
Qty: 4.5 ML | Refills: 0 | Status: SHIPPED | OUTPATIENT
Start: 2018-09-26 | End: 2018-10-22 | Stop reason: SDUPTHER

## 2018-10-15 ENCOUNTER — CARE COORDINATION (OUTPATIENT)
Dept: CARE COORDINATION | Age: 68
End: 2018-10-15

## 2018-10-22 ENCOUNTER — CARE COORDINATION (OUTPATIENT)
Dept: CARE COORDINATION | Age: 68
End: 2018-10-22

## 2018-10-22 DIAGNOSIS — Z79.4 TYPE 2 DIABETES MELLITUS WITH HYPERGLYCEMIA, WITH LONG-TERM CURRENT USE OF INSULIN (HCC): ICD-10-CM

## 2018-10-22 DIAGNOSIS — E11.65 TYPE 2 DIABETES MELLITUS WITH HYPERGLYCEMIA, WITH LONG-TERM CURRENT USE OF INSULIN (HCC): ICD-10-CM

## 2018-10-23 RX ORDER — INSULIN GLARGINE 300 U/ML
INJECTION, SOLUTION SUBCUTANEOUS
Qty: 4.5 ML | Refills: 1 | Status: SHIPPED | OUTPATIENT
Start: 2018-10-23 | End: 2019-01-24 | Stop reason: SDUPTHER

## 2018-11-05 ENCOUNTER — CARE COORDINATION (OUTPATIENT)
Dept: CARE COORDINATION | Age: 68
End: 2018-11-05

## 2018-11-26 ENCOUNTER — CARE COORDINATION (OUTPATIENT)
Dept: CARE COORDINATION | Age: 68
End: 2018-11-26

## 2018-12-03 ENCOUNTER — CARE COORDINATION (OUTPATIENT)
Dept: FAMILY MEDICINE CLINIC | Age: 68
End: 2018-12-03

## 2018-12-03 NOTE — CARE COORDINATION
mouth daily 5/26/17   Natalia Faust, DO   dorzolamide-timolol (COSOPT) 22.3-6.8 MG/ML ophthalmic solution  11/30/15   Historical Provider, MD       No future appointments. and   Diabetes Assessment    Medic Alert ID:  No  Meal Planning:  None   How often do you test your blood sugar?:  Daily   Do you have barriers with adherence to non-pharmacologic self-management interventions?  (Nutrition/Exercise/Self-Monitoring):  No   Have you ever had to go to the ED for symptoms of low blood sugar?:  No       No patient-reported symptoms

## 2018-12-17 ENCOUNTER — CARE COORDINATION (OUTPATIENT)
Dept: CARE COORDINATION | Age: 68
End: 2018-12-17

## 2019-01-03 ENCOUNTER — HOSPITAL ENCOUNTER (OUTPATIENT)
Age: 69
Setting detail: SPECIMEN
Discharge: HOME | End: 2019-01-03
Payer: MEDICARE

## 2019-01-03 ENCOUNTER — CARE COORDINATION (OUTPATIENT)
Dept: CARE COORDINATION | Age: 69
End: 2019-01-03

## 2019-01-03 ENCOUNTER — OFFICE VISIT (OUTPATIENT)
Dept: FAMILY MEDICINE CLINIC | Age: 69
End: 2019-01-03
Payer: MEDICARE

## 2019-01-03 VITALS
DIASTOLIC BLOOD PRESSURE: 77 MMHG | WEIGHT: 177 LBS | BODY MASS INDEX: 26.14 KG/M2 | HEART RATE: 70 BPM | OXYGEN SATURATION: 100 % | SYSTOLIC BLOOD PRESSURE: 149 MMHG

## 2019-01-03 DIAGNOSIS — D50.8 OTHER IRON DEFICIENCY ANEMIA: ICD-10-CM

## 2019-01-03 DIAGNOSIS — E78.00 PURE HYPERCHOLESTEROLEMIA: ICD-10-CM

## 2019-01-03 DIAGNOSIS — R68.89 COLD INTOLERANCE: ICD-10-CM

## 2019-01-03 DIAGNOSIS — E55.9 VITAMIN D DEFICIENCY: ICD-10-CM

## 2019-01-03 DIAGNOSIS — E11.65 TYPE 2 DIABETES MELLITUS WITH HYPERGLYCEMIA, WITH LONG-TERM CURRENT USE OF INSULIN (HCC): ICD-10-CM

## 2019-01-03 DIAGNOSIS — I10 ESSENTIAL HYPERTENSION: ICD-10-CM

## 2019-01-03 DIAGNOSIS — Z79.4 TYPE 2 DIABETES MELLITUS WITH HYPERGLYCEMIA, WITH LONG-TERM CURRENT USE OF INSULIN (HCC): ICD-10-CM

## 2019-01-03 DIAGNOSIS — R60.0 EDEMA OF BOTH LEGS: ICD-10-CM

## 2019-01-03 DIAGNOSIS — E11.65 TYPE 2 DIABETES MELLITUS WITH HYPERGLYCEMIA, WITH LONG-TERM CURRENT USE OF INSULIN (HCC): Primary | ICD-10-CM

## 2019-01-03 DIAGNOSIS — Z79.4 TYPE 2 DIABETES MELLITUS WITH HYPERGLYCEMIA, WITH LONG-TERM CURRENT USE OF INSULIN (HCC): Primary | ICD-10-CM

## 2019-01-03 LAB
CREATININE URINE: 50.3 MG/DL (ref 28–217)
HBA1C MFR BLD: 8.7 %
MICROALBUMIN/CREAT 24H UR: <12 MG/L
MICROALBUMIN/CREAT UR-RTO: NORMAL MCG/MG CREAT

## 2019-01-03 PROCEDURE — 4040F PNEUMOC VAC/ADMIN/RCVD: CPT | Performed by: FAMILY MEDICINE

## 2019-01-03 PROCEDURE — 1036F TOBACCO NON-USER: CPT | Performed by: FAMILY MEDICINE

## 2019-01-03 PROCEDURE — 1090F PRES/ABSN URINE INCON ASSESS: CPT | Performed by: FAMILY MEDICINE

## 2019-01-03 PROCEDURE — 3046F HEMOGLOBIN A1C LEVEL >9.0%: CPT | Performed by: FAMILY MEDICINE

## 2019-01-03 PROCEDURE — G8484 FLU IMMUNIZE NO ADMIN: HCPCS | Performed by: FAMILY MEDICINE

## 2019-01-03 PROCEDURE — 1123F ACP DISCUSS/DSCN MKR DOCD: CPT | Performed by: FAMILY MEDICINE

## 2019-01-03 PROCEDURE — 2022F DILAT RTA XM EVC RTNOPTHY: CPT | Performed by: FAMILY MEDICINE

## 2019-01-03 PROCEDURE — G8417 CALC BMI ABV UP PARAM F/U: HCPCS | Performed by: FAMILY MEDICINE

## 2019-01-03 PROCEDURE — 99214 OFFICE O/P EST MOD 30 MIN: CPT | Performed by: FAMILY MEDICINE

## 2019-01-03 PROCEDURE — G8427 DOCREV CUR MEDS BY ELIG CLIN: HCPCS | Performed by: FAMILY MEDICINE

## 2019-01-03 PROCEDURE — 3017F COLORECTAL CA SCREEN DOC REV: CPT | Performed by: FAMILY MEDICINE

## 2019-01-03 PROCEDURE — G8400 PT W/DXA NO RESULTS DOC: HCPCS | Performed by: FAMILY MEDICINE

## 2019-01-03 PROCEDURE — 83036 HEMOGLOBIN GLYCOSYLATED A1C: CPT | Performed by: FAMILY MEDICINE

## 2019-01-03 PROCEDURE — 1101F PT FALLS ASSESS-DOCD LE1/YR: CPT | Performed by: FAMILY MEDICINE

## 2019-01-03 ASSESSMENT — PATIENT HEALTH QUESTIONNAIRE - PHQ9
SUM OF ALL RESPONSES TO PHQ9 QUESTIONS 1 & 2: 0
SUM OF ALL RESPONSES TO PHQ QUESTIONS 1-9: 0
SUM OF ALL RESPONSES TO PHQ QUESTIONS 1-9: 0
1. LITTLE INTEREST OR PLEASURE IN DOING THINGS: 0
2. FEELING DOWN, DEPRESSED OR HOPELESS: 0

## 2019-01-09 ENCOUNTER — CARE COORDINATION (OUTPATIENT)
Dept: CARE COORDINATION | Age: 69
End: 2019-01-09

## 2019-01-23 ENCOUNTER — CARE COORDINATION (OUTPATIENT)
Dept: CARE COORDINATION | Age: 69
End: 2019-01-23

## 2019-01-24 DIAGNOSIS — Z79.4 TYPE 2 DIABETES MELLITUS WITH HYPERGLYCEMIA, WITH LONG-TERM CURRENT USE OF INSULIN (HCC): ICD-10-CM

## 2019-01-24 DIAGNOSIS — E11.65 TYPE 2 DIABETES MELLITUS WITH HYPERGLYCEMIA, WITH LONG-TERM CURRENT USE OF INSULIN (HCC): ICD-10-CM

## 2019-02-06 ENCOUNTER — CARE COORDINATION (OUTPATIENT)
Dept: CARE COORDINATION | Age: 69
End: 2019-02-06

## 2019-02-06 DIAGNOSIS — E78.5 HYPERLIPIDEMIA, UNSPECIFIED HYPERLIPIDEMIA TYPE: ICD-10-CM

## 2019-02-06 DIAGNOSIS — E03.9 HYPOTHYROIDISM, UNSPECIFIED TYPE: Primary | ICD-10-CM

## 2019-02-06 RX ORDER — LEVOTHYROXINE SODIUM 0.05 MG/1
50 TABLET ORAL DAILY
Qty: 30 TABLET | Refills: 2 | Status: SHIPPED | OUTPATIENT
Start: 2019-02-06

## 2019-02-06 RX ORDER — ATORVASTATIN CALCIUM 10 MG/1
10 TABLET, FILM COATED ORAL DAILY
Qty: 30 TABLET | Refills: 11 | Status: SHIPPED | OUTPATIENT
Start: 2019-02-06 | End: 2020-11-24

## 2019-02-20 ENCOUNTER — CARE COORDINATION (OUTPATIENT)
Dept: CARE COORDINATION | Age: 69
End: 2019-02-20

## 2019-02-25 ENCOUNTER — CARE COORDINATION (OUTPATIENT)
Dept: CARE COORDINATION | Age: 69
End: 2019-02-25

## 2019-03-05 ENCOUNTER — CARE COORDINATION (OUTPATIENT)
Dept: CARE COORDINATION | Age: 69
End: 2019-03-05

## 2019-03-12 ENCOUNTER — CARE COORDINATION (OUTPATIENT)
Dept: CARE COORDINATION | Age: 69
End: 2019-03-12

## 2019-03-20 ENCOUNTER — CARE COORDINATION (OUTPATIENT)
Dept: CARE COORDINATION | Age: 69
End: 2019-03-20

## 2019-03-27 DIAGNOSIS — E11.65 TYPE 2 DIABETES MELLITUS WITH HYPERGLYCEMIA, WITH LONG-TERM CURRENT USE OF INSULIN (HCC): ICD-10-CM

## 2019-03-27 DIAGNOSIS — Z79.4 TYPE 2 DIABETES MELLITUS WITH HYPERGLYCEMIA, WITH LONG-TERM CURRENT USE OF INSULIN (HCC): ICD-10-CM

## 2019-05-07 ENCOUNTER — HOSPITAL ENCOUNTER (OUTPATIENT)
Dept: MRI IMAGING | Age: 69
Discharge: HOME | End: 2019-05-09
Payer: MEDICARE

## 2019-05-07 DIAGNOSIS — S91.301A OPEN WOUND OF PLANTAR ASPECT OF FOOT, RIGHT, INITIAL ENCOUNTER: ICD-10-CM

## 2019-05-07 LAB
CREAT SERPL-MCNC: 0.84 MG/DL (ref 0.5–0.9)
GFR AFRICAN AMERICAN: >60 ML/MIN
GFR NON-AFRICAN AMERICAN: >60 ML/MIN
GFR SERPL CREATININE-BSD FRML MDRD: NORMAL ML/MIN/{1.73_M2}
GFR SERPL CREATININE-BSD FRML MDRD: NORMAL ML/MIN/{1.73_M2}

## 2019-05-07 PROCEDURE — A9579 GAD-BASE MR CONTRAST NOS,1ML: HCPCS | Performed by: PODIATRIST

## 2019-05-07 PROCEDURE — 73720 MRI LWR EXTREMITY W/O&W/DYE: CPT

## 2019-05-07 PROCEDURE — 82565 ASSAY OF CREATININE: CPT

## 2019-05-07 PROCEDURE — 36415 COLL VENOUS BLD VENIPUNCTURE: CPT

## 2019-05-07 PROCEDURE — 6360000004 HC RX CONTRAST MEDICATION: Performed by: PODIATRIST

## 2019-05-07 RX ADMIN — GADOTERIDOL 17 ML: 279.3 INJECTION, SOLUTION INTRAVENOUS at 09:26

## 2019-05-21 PROBLEM — E08.621 DIABETIC ULCER OF LEFT FOOT ASSOCIATED WITH DIABETES MELLITUS DUE TO UNDERLYING CONDITION, WITH FAT LAYER EXPOSED (HCC): Status: ACTIVE | Noted: 2019-03-27

## 2019-05-21 PROBLEM — E10.42 DIABETIC POLYNEUROPATHY ASSOCIATED WITH TYPE 1 DIABETES MELLITUS (HCC): Status: ACTIVE | Noted: 2019-03-27

## 2019-05-21 PROBLEM — L97.522 DIABETIC ULCER OF LEFT FOOT ASSOCIATED WITH DIABETES MELLITUS DUE TO UNDERLYING CONDITION, WITH FAT LAYER EXPOSED (HCC): Status: ACTIVE | Noted: 2019-03-27

## 2019-05-30 ENCOUNTER — OFFICE VISIT (OUTPATIENT)
Dept: INFECTIOUS DISEASES | Age: 69
End: 2019-05-30
Payer: MEDICARE

## 2019-05-30 VITALS
BODY MASS INDEX: 25.18 KG/M2 | HEART RATE: 61 BPM | HEIGHT: 69 IN | SYSTOLIC BLOOD PRESSURE: 120 MMHG | WEIGHT: 170 LBS | TEMPERATURE: 97.6 F | DIASTOLIC BLOOD PRESSURE: 71 MMHG

## 2019-05-30 DIAGNOSIS — L97.509: ICD-10-CM

## 2019-05-30 DIAGNOSIS — M86.171 OSTEOMYELITIS OF ANKLE OR FOOT, ACUTE, RIGHT (HCC): ICD-10-CM

## 2019-05-30 DIAGNOSIS — E11.610 DIABETIC CHARCOT FOOT (HCC): Primary | ICD-10-CM

## 2019-05-30 PROCEDURE — 4040F PNEUMOC VAC/ADMIN/RCVD: CPT | Performed by: INTERNAL MEDICINE

## 2019-05-30 PROCEDURE — 1036F TOBACCO NON-USER: CPT | Performed by: INTERNAL MEDICINE

## 2019-05-30 PROCEDURE — 3045F PR MOST RECENT HEMOGLOBIN A1C LEVEL 7.0-9.0%: CPT | Performed by: INTERNAL MEDICINE

## 2019-05-30 PROCEDURE — 2022F DILAT RTA XM EVC RTNOPTHY: CPT | Performed by: INTERNAL MEDICINE

## 2019-05-30 PROCEDURE — 3017F COLORECTAL CA SCREEN DOC REV: CPT | Performed by: INTERNAL MEDICINE

## 2019-05-30 PROCEDURE — G8400 PT W/DXA NO RESULTS DOC: HCPCS | Performed by: INTERNAL MEDICINE

## 2019-05-30 PROCEDURE — G8417 CALC BMI ABV UP PARAM F/U: HCPCS | Performed by: INTERNAL MEDICINE

## 2019-05-30 PROCEDURE — 1123F ACP DISCUSS/DSCN MKR DOCD: CPT | Performed by: INTERNAL MEDICINE

## 2019-05-30 PROCEDURE — G8427 DOCREV CUR MEDS BY ELIG CLIN: HCPCS | Performed by: INTERNAL MEDICINE

## 2019-05-30 PROCEDURE — 1090F PRES/ABSN URINE INCON ASSESS: CPT | Performed by: INTERNAL MEDICINE

## 2019-05-30 PROCEDURE — 99204 OFFICE O/P NEW MOD 45 MIN: CPT | Performed by: INTERNAL MEDICINE

## 2019-05-30 ASSESSMENT — ENCOUNTER SYMPTOMS
GASTROINTESTINAL NEGATIVE: 1
RESPIRATORY NEGATIVE: 1

## 2019-05-30 NOTE — PROGRESS NOTES
[facility]   Office Consult Note  Today's Date and Time: 5/30/2019, 11:14 AM    Impression:     1. Diabetic Charcot foot ([facility] [address]    2. Plantar ulcer, unspecified laterality, unspecified ulcer stage ([facility] [address]    3. Osteomyelitis of ankle or foot, acute, right ([facility] [address]         Recommendations   · I had a lengthy discussion with Nubia Head about her current situation. · The callus and ulceration that she's had for over 10 years is related to a Charcot foot deformity and there is a bony prominence at the wound bed. · While there is osteomyelitis IV antimicrobial therapy alone will not treat/address this. I discussed with her that she would need debridement surgery followed by subsequent antimicrobial therapy. · The other issue is that even if I was to do IV antimicrobial therapy alone without any surgical approach the Charcot deformity and the bony prominence will remain as will the ulceration which we'll continue to expose the small to further bacteria exposure and osteomyelitis. · At this point in time I would just recommend continued local care but I did tell the patient she does run the risk of getting a worsening soft tissue infection and potential severe diabetic foot infection which could ultimately lead to limb loss    I have ordered the following medications/ labs:  No orders of the defined types were placed in this encounter. No orders of the defined types were placed in this encounter. Chief complaint/reason for consultation:     Chief Complaint   Patient presents with    Frequent Infections     NEW PATIENT        History of Present Illness:   Shane Calix is a 81y.o.-year-old female who is seen at there request of Chatham Jhon, Hanny Charles reports a long-standing history of diabetes mellitus with associated Charcot foot arthropathy. She is had a chronic wound on the right plantar aspect of the foot and she reports that she's had this for at least 10 years on and off.   The patient has had remote foot surgeries for the Charcot foot arthropathy and in December she reports that she got new shoes and the callus that was on the plantar aspect of the foot opened up and she developed an ulceration. The patient has had drainage from the ulcer bed has been following with Dr. Andres Jarrett from podiatry with whom I discussed the care with. It is my understanding that Janneth Naidu has not been compliant with offloading measures and has been consistent on wearing shoes. The patient was found to have ostium later this and a recent MRI and was offered surgical approach but has since refused this. The patient was sent in to see me for the osteomyelitis that I was asked to help her and evaluate for IV antimicrobial therapy. I have personally reviewed the past medical history,past surgical history, medications, social history, and family history, and I have updated the database accordingly.   PastMedical History:     Past Medical History:   Diagnosis Date    Diabetic polyneuropathy associated with type 1 diabetes mellitus ([facility] [address] 3/27/2019    Diabetic ulcer of left foot associated with diabetes mellitus due to underlying condition, with fat layer exposed ([facility] [address] 3/27/2019    Type 2 diabetes mellitus with hyperglycemia, with long-term current use of insulin ([facility] [address] 7/31/2018    Type II or unspecified type diabetes mellitus without mention of complication, not stated as uncontrolled      Past Surgical  History:     Past Surgical History:   Procedure Laterality Date    FOOT SURGERY  +10years    R foot      Medications:     Current Outpatient Medications   Medication Sig Dispense Refill    insulin glargine (TOUJEO SOLOSTAR) 300 UNIT/ML injection pen INJECT 50 UNITS UNDER THE SKIN AT BEDTIME 5 pen 5    atorvastatin (LIPITOR) 10 MG tablet Take 1 tablet by mouth daily 30 tablet 11    levothyroxine (SYNTHROID) 50 MCG tablet Take 1 tablet by mouth daily 30 tablet 2    FERREX 150 FORTE 150-1-25 MG-MG-MCG CAPS service: Not on file     Active member of club or organization: Not on file     Attends meetings of clubs or organizations: Not on file     Relationship status: Not on file    Intimate partner violence:     Fear of current or ex partner: Not on file     Emotionally abused: Not on file     Physically abused: Not on file     Forced sexual activity: Not on file   Other Topics Concern    Not on file   Social History Narrative    Not on file     Family History:     Family History   Problem Relation Age of Onset    Diabetes Mother     Diabetes Brother       Allergies:   Patient has no known allergies. Review of Systems:   Review of Systems   Constitutional: Negative. Respiratory: Negative. Cardiovascular: Negative. Gastrointestinal: Negative. Genitourinary: Negative. Musculoskeletal: Negative. Skin: Positive for wound. Neurological: Negative. Psychiatric/Behavioral: Negative. Physical Examination :   /71 (Site: Left Upper Arm)   Pulse 61   Temp 97.6 °F (36.4 °C)   Ht 5' 9\" (1.753 m)   Wt 170 lb (77.1 kg)   BMI 25.10 kg/m²     Physical Exam   Constitutional: She is oriented to person, place, and time. She appears well-developed and well-nourished. HENT:   Head: Normocephalic and atraumatic. Neck: Normal range of motion. Neck supple. Cardiovascular: Regular rhythm and normal heart sounds. Pulmonary/Chest: Effort normal and breath sounds normal.   Abdominal: Soft. Bowel sounds are normal.   Musculoskeletal:   Charcot foot deformity   Neurological: She is alert and oriented to person, place, and time. Skin: Skin is warm and dry.    Plantar foot ulcer with raised edges and good beefy granulation tissue at the wound base                   Medical Decision Making:   I haveindependently reviewed the following labs:    CBC with Differential:    CBC auto differential (04/09/2019 9:36 AM EDT)  CBC auto differential (04/09/2019 9:36 AM EDT)   Component Value Ref Range digits are inseparable from the   region of ulceration. 4. Degenerative changes as detailed above.    The findings were sent to the Radiology Results [address] at 10:29   am on 5/7/2019to be communicated to a licensed caregiver.           Cultures:   Wound culture superficial includes gram stain (03/27/2019 5:41 PM EDT)  Wound culture superficial includes gram stain (03/27/2019 5:41 PM EDT)   Component Value Ref Range Performed At Pathologist Signature   Specimen description WOUND SWAB RIGHT FOOT [address]     Gram Stain Result 0 WHITE BLOOD CELLS/LPF   [address]     Gram Stain Result 0 SQUAMOUS EPITHELIAL CELLS/LPF   [address]     Gram Stain Result MANY [address]     Gram Stain Result MANY [address]     Gram Stain Result FEW [address] [facility]   [address]     Culture FEW STAPHYLOCOCCUS AUREUS   [address]     Culture FEW STAPHYLOCOCCUS AUREUS VARIANT   [address]     Culture RARE PSEUDOMONAS AERUGINOSA   [address]     Culture ALONG WITH MODERATE MIXED GRAM POSITIVE AND GRAM NEGATIVE ORGANISMS   [address]     Culture NO BETA HEMOLYTIC STREPTOCOCCI GROUP A ISOLATED   [address]     Report status 03/31/2019 FINAL   [address]     Organism STAPHYLOCOCCUS AUREUS   [address]     Organism STAPHYLOCOCCUS AUREUS   [address]     Organism PSEUDOMONAS AERUGINOSA   [facility]       Wound culture superficial includes gram stain (03/27/2019 5:41 PM EDT)   Specimen   Swab - Wound Swab     Wound culture superficial includes gram stain (03/27/2019 5:41 PM EDT)   Organism Antibiotic Method Susceptibility   Staphylococcus Aureus Cefazolin ARLINE SUSCEPTIBLE(DEDUCED)     Staphylococcus Aureus Clindamycin ARLINE 0.25: Susceptible     Staphylococcus Aureus Erythromycin ARLINE <=0.25: Susceptible     Staphylococcus Aureus Gentamicin ARLINE <=0.5: Susceptible     Staphylococcus Aureus Oxacillin ARLINE 0.5: Susceptible     Staphylococcus Aureus TRIMETH/SULFAMETHOXAZOLE ARLINE <=0.5/9.5: Susceptible     Staphylococcus Aureus Vancomycin ARLINE 1: Susceptible     Staphylococcus Aureus Doxycycline ARLINE <=0.5: Susceptible     Comment: FEW STAPHYLOCOCCUS AUREUS   Staphylococcus Aureus Cefazolin ARLINE SUSCEPTIBLE(DEDUCED)     Staphylococcus Aureus Clindamycin ARLINE 0.25: Susceptible     Staphylococcus Aureus Erythromycin ARLINE <=0.25: Susceptible     Staphylococcus Aureus Gentamicin ARLINE <=0.5: Susceptible     Staphylococcus Aureus Oxacillin ARLINE 0.5: Susceptible     Staphylococcus Aureus TRIMETH/SULFAMETHOXAZOLE ARLINE <=0.5/9.5: Susceptible     Staphylococcus Aureus Vancomycin ARLINE 1: Susceptible     Staphylococcus Aureus Doxycycline ARLINE <=0.5: Susceptible     Comment: FEW STAPHYLOCOCCUS AUREUS VARIANT   Pseudomonas Aeruginosa Amikacin ARLINE <=2: Susceptible     Pseudomonas Aeruginosa Cefepime ARLINE <=1: Susceptible     Pseudomonas Aeruginosa Ciprofloxacin ARLINE <=0.25: Susceptible     Pseudomonas Aeruginosa Gentamicin ARLINE <=1: Susceptible     Pseudomonas Aeruginosa Levofloxacin ARLINE 0.5: Susceptible     Pseudomonas Aeruginosa Meropenem ARLINE 1: Susceptible     Pseudomonas Aeruginosa Piperacillin ARLINE <=4: Susceptible     Pseudomonas Aeruginosa Tobramycin ARLINE <=1: Susceptible     Comment: RARE PSEUDOMONAS AERUGINOSA           Thank you for allowing us to participate in the care of this patient. Pleasecall with questions.     Cande Kat MD  Perfect [employer] messaging: [phone]    This note is created with the assistance of a speech recognition program.  While intending to generate a document that actually reflects the content ofthe visit, the

## 2019-06-03 ENCOUNTER — OFFICE VISIT (OUTPATIENT)
Dept: FAMILY MEDICINE CLINIC | Age: 69
End: 2019-06-03
Payer: MEDICARE

## 2019-06-03 ENCOUNTER — HOSPITAL ENCOUNTER (OUTPATIENT)
Age: 69
Setting detail: SPECIMEN
Discharge: HOME | End: 2019-06-03
Payer: MEDICARE

## 2019-06-03 VITALS
RESPIRATION RATE: 16 BRPM | SYSTOLIC BLOOD PRESSURE: 131 MMHG | WEIGHT: 170.6 LBS | DIASTOLIC BLOOD PRESSURE: 77 MMHG | OXYGEN SATURATION: 100 % | HEIGHT: 69 IN | BODY MASS INDEX: 25.27 KG/M2 | HEART RATE: 67 BPM

## 2019-06-03 DIAGNOSIS — E11.65 TYPE 2 DIABETES MELLITUS WITH HYPERGLYCEMIA, WITH LONG-TERM CURRENT USE OF INSULIN (HCC): Primary | ICD-10-CM

## 2019-06-03 DIAGNOSIS — Z12.31 SCREENING MAMMOGRAM, ENCOUNTER FOR: ICD-10-CM

## 2019-06-03 DIAGNOSIS — Z12.11 SCREEN FOR COLON CANCER: ICD-10-CM

## 2019-06-03 DIAGNOSIS — M81.0 OSTEOPOROSIS, UNSPECIFIED OSTEOPOROSIS TYPE, UNSPECIFIED PATHOLOGICAL FRACTURE PRESENCE: ICD-10-CM

## 2019-06-03 DIAGNOSIS — E78.00 PURE HYPERCHOLESTEROLEMIA: ICD-10-CM

## 2019-06-03 DIAGNOSIS — Z79.4 TYPE 2 DIABETES MELLITUS WITH HYPERGLYCEMIA, WITH LONG-TERM CURRENT USE OF INSULIN (HCC): Primary | ICD-10-CM

## 2019-06-03 DIAGNOSIS — E55.9 VITAMIN D DEFICIENCY: ICD-10-CM

## 2019-06-03 DIAGNOSIS — D50.8 OTHER IRON DEFICIENCY ANEMIA: ICD-10-CM

## 2019-06-03 DIAGNOSIS — K21.00 GASTROESOPHAGEAL REFLUX DISEASE WITH ESOPHAGITIS: ICD-10-CM

## 2019-06-03 DIAGNOSIS — Z79.4 TYPE 2 DIABETES MELLITUS WITH HYPERGLYCEMIA, WITH LONG-TERM CURRENT USE OF INSULIN (HCC): ICD-10-CM

## 2019-06-03 DIAGNOSIS — R60.0 EDEMA OF BOTH LEGS: ICD-10-CM

## 2019-06-03 DIAGNOSIS — I10 ESSENTIAL HYPERTENSION: ICD-10-CM

## 2019-06-03 DIAGNOSIS — E03.9 ACQUIRED HYPOTHYROIDISM: ICD-10-CM

## 2019-06-03 DIAGNOSIS — E11.65 TYPE 2 DIABETES MELLITUS WITH HYPERGLYCEMIA, WITH LONG-TERM CURRENT USE OF INSULIN (HCC): ICD-10-CM

## 2019-06-03 LAB
CREATININE URINE: 34.1 MG/DL (ref 28–217)
HBA1C MFR BLD: 6.7 %
MICROALBUMIN/CREAT 24H UR: <12 MG/L
MICROALBUMIN/CREAT UR-RTO: NORMAL MCG/MG CREAT

## 2019-06-03 PROCEDURE — 3017F COLORECTAL CA SCREEN DOC REV: CPT | Performed by: FAMILY MEDICINE

## 2019-06-03 PROCEDURE — G8400 PT W/DXA NO RESULTS DOC: HCPCS | Performed by: FAMILY MEDICINE

## 2019-06-03 PROCEDURE — G8427 DOCREV CUR MEDS BY ELIG CLIN: HCPCS | Performed by: FAMILY MEDICINE

## 2019-06-03 PROCEDURE — 1090F PRES/ABSN URINE INCON ASSESS: CPT | Performed by: FAMILY MEDICINE

## 2019-06-03 PROCEDURE — G8417 CALC BMI ABV UP PARAM F/U: HCPCS | Performed by: FAMILY MEDICINE

## 2019-06-03 PROCEDURE — 4040F PNEUMOC VAC/ADMIN/RCVD: CPT | Performed by: FAMILY MEDICINE

## 2019-06-03 PROCEDURE — 2022F DILAT RTA XM EVC RTNOPTHY: CPT | Performed by: FAMILY MEDICINE

## 2019-06-03 PROCEDURE — 1036F TOBACCO NON-USER: CPT | Performed by: FAMILY MEDICINE

## 2019-06-03 PROCEDURE — 99214 OFFICE O/P EST MOD 30 MIN: CPT | Performed by: FAMILY MEDICINE

## 2019-06-03 PROCEDURE — 3044F HG A1C LEVEL LT 7.0%: CPT | Performed by: FAMILY MEDICINE

## 2019-06-03 PROCEDURE — 1123F ACP DISCUSS/DSCN MKR DOCD: CPT | Performed by: FAMILY MEDICINE

## 2019-06-03 PROCEDURE — 83036 HEMOGLOBIN GLYCOSYLATED A1C: CPT | Performed by: FAMILY MEDICINE

## 2019-06-03 RX ORDER — POTASSIUM CHLORIDE 20 MEQ/1
TABLET, EXTENDED RELEASE ORAL
Qty: 90 TABLET | Refills: 1 | Status: SHIPPED | OUTPATIENT
Start: 2019-06-03

## 2019-06-03 RX ORDER — FUROSEMIDE 40 MG/1
TABLET ORAL
Qty: 90 TABLET | Refills: 1 | Status: SHIPPED | OUTPATIENT
Start: 2019-06-03 | End: 2020-02-13 | Stop reason: SDUPTHER

## 2019-06-03 NOTE — PROGRESS NOTES
[facility]  [address] [place] [id number]  Dept: [phone]      Cyndi Tinsley is a 81 y.o. female who presents today for follow up on her  medical conditions as noted below.       Chief Complaint   Patient presents with    Diabetes       Patient Active Problem List:     Type 2 diabetes mellitus with hyperglycemia, with long-term current use of insulin ([address]     Diabetic polyneuropathy associated with type 1 diabetes mellitus ([address]     Diabetic ulcer of left foot associated with diabetes mellitus due to underlying condition, with fat layer exposed ([address]     Past Medical History:   Diagnosis Date    Diabetic polyneuropathy associated with type 1 diabetes mellitus ([address] 3/27/2019    Diabetic ulcer of left foot associated with diabetes mellitus due to underlying condition, with fat layer exposed ([address] 3/27/2019    Type 2 diabetes mellitus with hyperglycemia, with long-term current use of insulin ([address] 7/31/2018    Type II or unspecified type diabetes mellitus without mention of complication, not stated as uncontrolled       Past Surgical History:   Procedure Laterality Date    FOOT SURGERY  +10years    R foot      Family History   Problem Relation Age of Onset    Diabetes Mother     Diabetes Brother        Current Outpatient Medications   Medication Sig Dispense Refill    potassium chloride (KLOR-CON M) 20 MEQ extended release tablet TAKE 1 TABLET EVERY DAY 90 tablet 1    furosemide (LASIX) 40 MG tablet TAKE 1 TABLET EVERY DAY 90 tablet 1    insulin glargine (TOUJEO SOLOSTAR) 300 UNIT/ML injection pen INJECT 50 UNITS UNDER THE SKIN AT BEDTIME 5 pen 5    atorvastatin (LIPITOR) 10 MG tablet Take 1 tablet by mouth daily 30 tablet 11    levothyroxine (SYNTHROID) 50 MCG tablet Take 1 tablet by mouth daily 30 tablet 2    FERREX 150 FORTE 150-1-25 MG-MG-MCG CAPS capsule TAKE 1 CAPSULE BY MOUTH TWICE DAILY 30 capsule 11    ACCU-CHEK Order Specific Question:   Is Patient Fasting?/# of Hours     Answer:   yes    T4, Free     Standing Status:   Future     Standing Expiration Date:   6/2/2020    Thyroid Peroxidase Antibody     Standing Status:   Future     Standing Expiration Date:   6/2/2020    TSH without Reflex     Standing Status:   Future     Standing Expiration Date:   6/2/2020    Microalbumin, Ur     Standing Status:   Future     Standing Expiration Date:   6/2/2020    POCT glycosylated hemoglobin (Hb A1C)     Orders Placed This Encounter   Medications    potassium chloride (KLOR-CON M) 20 MEQ extended release tablet     Sig: TAKE 1 TABLET EVERY DAY     Dispense:  90 tablet     Refill:  1    furosemide (LASIX) 40 MG tablet     Sig: TAKE 1 TABLET EVERY DAY     Dispense:  90 tablet     Refill:  1     Get all testing done and fu I 6 m Electronically signed by Anum Arroyo DO on 6/3/2019 at 9:42 AM

## 2019-07-29 ENCOUNTER — HOSPITAL ENCOUNTER (OUTPATIENT)
Age: 69
Discharge: HOME | End: 2019-07-29
Payer: MEDICARE

## 2019-07-29 ENCOUNTER — HOSPITAL ENCOUNTER (OUTPATIENT)
Dept: MAMMOGRAPHY | Age: 69
Discharge: HOME | End: 2019-07-31
Payer: MEDICARE

## 2019-07-29 DIAGNOSIS — D50.8 OTHER IRON DEFICIENCY ANEMIA: ICD-10-CM

## 2019-07-29 DIAGNOSIS — R60.0 EDEMA OF BOTH LEGS: ICD-10-CM

## 2019-07-29 DIAGNOSIS — E03.9 ACQUIRED HYPOTHYROIDISM: ICD-10-CM

## 2019-07-29 DIAGNOSIS — I10 ESSENTIAL HYPERTENSION: ICD-10-CM

## 2019-07-29 DIAGNOSIS — Z12.31 SCREENING MAMMOGRAM, ENCOUNTER FOR: ICD-10-CM

## 2019-07-29 DIAGNOSIS — E55.9 VITAMIN D DEFICIENCY: ICD-10-CM

## 2019-07-29 DIAGNOSIS — E78.00 PURE HYPERCHOLESTEROLEMIA: ICD-10-CM

## 2019-07-29 DIAGNOSIS — M81.0 OSTEOPOROSIS, UNSPECIFIED OSTEOPOROSIS TYPE, UNSPECIFIED PATHOLOGICAL FRACTURE PRESENCE: ICD-10-CM

## 2019-07-29 DIAGNOSIS — K21.00 GASTROESOPHAGEAL REFLUX DISEASE WITH ESOPHAGITIS: ICD-10-CM

## 2019-07-29 DIAGNOSIS — Z79.4 TYPE 2 DIABETES MELLITUS WITH HYPERGLYCEMIA, WITH LONG-TERM CURRENT USE OF INSULIN (HCC): ICD-10-CM

## 2019-07-29 DIAGNOSIS — E11.65 TYPE 2 DIABETES MELLITUS WITH HYPERGLYCEMIA, WITH LONG-TERM CURRENT USE OF INSULIN (HCC): ICD-10-CM

## 2019-07-29 LAB
ABSOLUTE EOS #: 0.1 K/UL (ref 0–0.44)
ABSOLUTE IMMATURE GRANULOCYTE: <0.03 K/UL (ref 0–0.3)
ABSOLUTE LYMPH #: 1.73 K/UL (ref 1.1–3.7)
ABSOLUTE MONO #: 0.35 K/UL (ref 0.1–1.2)
ALBUMIN SERPL-MCNC: 4.1 G/DL (ref 3.5–5.2)
ALBUMIN/GLOBULIN RATIO: 1.5 (ref 1–2.5)
ALP BLD-CCNC: 92 U/L (ref 35–104)
ALT SERPL-CCNC: 22 U/L (ref 5–33)
ANION GAP SERPL CALCULATED.3IONS-SCNC: 15 MMOL/L (ref 9–17)
AST SERPL-CCNC: 21 U/L
BASOPHILS # BLD: 1 % (ref 0–2)
BASOPHILS ABSOLUTE: <0.03 K/UL (ref 0–0.2)
BILIRUB SERPL-MCNC: 0.5 MG/DL (ref 0.3–1.2)
BUN BLDV-MCNC: 20 MG/DL (ref 8–23)
BUN/CREAT BLD: ABNORMAL (ref 9–20)
CALCIUM SERPL-MCNC: 9.3 MG/DL (ref 8.6–10.4)
CHLORIDE BLD-SCNC: 108 MMOL/L (ref 98–107)
CHOLESTEROL/HDL RATIO: 2.1
CHOLESTEROL: 137 MG/DL
CO2: 27 MMOL/L (ref 20–31)
CREAT SERPL-MCNC: 0.74 MG/DL (ref 0.5–0.9)
DIFFERENTIAL TYPE: ABNORMAL
EOSINOPHILS RELATIVE PERCENT: 3 % (ref 1–4)
GFR AFRICAN AMERICAN: >60 ML/MIN
GFR NON-AFRICAN AMERICAN: >60 ML/MIN
GFR SERPL CREATININE-BSD FRML MDRD: ABNORMAL ML/MIN/{1.73_M2}
GFR SERPL CREATININE-BSD FRML MDRD: ABNORMAL ML/MIN/{1.73_M2}
GLUCOSE BLD-MCNC: 34 MG/DL (ref 70–99)
HCT VFR BLD CALC: 34.1 % (ref 36.3–47.1)
HDLC SERPL-MCNC: 64 MG/DL
HEMOGLOBIN: 10.5 G/DL (ref 11.9–15.1)
IMMATURE GRANULOCYTES: 0 %
LDL CHOLESTEROL: 60 MG/DL (ref 0–130)
LYMPHOCYTES # BLD: 46 % (ref 24–43)
MCH RBC QN AUTO: 27.7 PG (ref 25.2–33.5)
MCHC RBC AUTO-ENTMCNC: 30.8 G/DL (ref 28.4–34.8)
MCV RBC AUTO: 90 FL (ref 82.6–102.9)
MONOCYTES # BLD: 9 % (ref 3–12)
NRBC AUTOMATED: 0 PER 100 WBC
PDW BLD-RTO: 13.2 % (ref 11.8–14.4)
PLATELET # BLD: 194 K/UL (ref 138–453)
PLATELET ESTIMATE: ABNORMAL
PMV BLD AUTO: 12.3 FL (ref 8.1–13.5)
POTASSIUM SERPL-SCNC: 3.7 MMOL/L (ref 3.7–5.3)
RBC # BLD: 3.79 M/UL (ref 3.95–5.11)
RBC # BLD: ABNORMAL 10*6/UL
SEG NEUTROPHILS: 41 % (ref 36–65)
SEGMENTED NEUTROPHILS ABSOLUTE COUNT: 1.54 K/UL (ref 1.5–8.1)
SODIUM BLD-SCNC: 150 MMOL/L (ref 135–144)
THYROXINE, FREE: 1 NG/DL (ref 0.93–1.7)
TOTAL PROTEIN: 6.9 G/DL (ref 6.4–8.3)
TRIGL SERPL-MCNC: 65 MG/DL
TSH SERPL DL<=0.05 MIU/L-ACNC: 0.99 MIU/L (ref 0.3–5)
VLDLC SERPL CALC-MCNC: NORMAL MG/DL (ref 1–30)
WBC # BLD: 3.7 K/UL (ref 3.5–11.3)
WBC # BLD: ABNORMAL 10*3/UL

## 2019-07-29 PROCEDURE — 80061 LIPID PANEL: CPT

## 2019-07-29 PROCEDURE — 77063 BREAST TOMOSYNTHESIS BI: CPT

## 2019-07-29 PROCEDURE — 86376 MICROSOMAL ANTIBODY EACH: CPT

## 2019-07-29 PROCEDURE — 84439 ASSAY OF FREE THYROXINE: CPT

## 2019-07-29 PROCEDURE — 36415 COLL VENOUS BLD VENIPUNCTURE: CPT

## 2019-07-29 PROCEDURE — 77080 DXA BONE DENSITY AXIAL: CPT

## 2019-07-29 PROCEDURE — 80053 COMPREHEN METABOLIC PANEL: CPT

## 2019-07-29 PROCEDURE — 85025 COMPLETE CBC W/AUTO DIFF WBC: CPT

## 2019-07-29 PROCEDURE — 84443 ASSAY THYROID STIM HORMONE: CPT

## 2019-07-29 NOTE — LETTER
[address]  [facility], [facility]  Phone: [phone]  Fax: [phone]          August 7, 2019    [id number] [facility]      Dear Jeramie Roberts:    According to the radiologists interpretation, your mammogram did not show any significant findings. Please remember that some cancers (about 10-15%) cannot be found by mammography alone, and that early detection requires a combination of monthly self-examination, yearly physical examination, and periodic mammography. The American Cancer Society Guidelines recommend screening mammograms and physical breast examinations every year beginning at the age of 36. Please continue regular breast self-examinations and report any changes that concern you, even before your next appointment. If you have any further questions, please don't hesitate to call the office.     Sincerely,       Sherry Wolf MD

## 2019-07-30 LAB — THYROID PEROXIDASE (TPO) AB: <10 IU/ML (ref 0–35)

## 2019-08-14 DIAGNOSIS — M85.80 OSTEOPENIA, UNSPECIFIED LOCATION: Primary | ICD-10-CM

## 2019-08-14 RX ORDER — ALENDRONATE SODIUM 70 MG/1
70 TABLET ORAL
Qty: 4 TABLET | Refills: 3 | Status: SHIPPED | OUTPATIENT
Start: 2019-08-14 | End: 2020-02-13 | Stop reason: SDUPTHER

## 2019-09-25 DIAGNOSIS — E11.65 TYPE 2 DIABETES MELLITUS WITH HYPERGLYCEMIA, WITH LONG-TERM CURRENT USE OF INSULIN (HCC): ICD-10-CM

## 2019-09-25 DIAGNOSIS — Z79.4 TYPE 2 DIABETES MELLITUS WITH HYPERGLYCEMIA, WITH LONG-TERM CURRENT USE OF INSULIN (HCC): ICD-10-CM

## 2019-11-03 ENCOUNTER — APPOINTMENT (OUTPATIENT)
Dept: CT IMAGING | Age: 69
End: 2019-11-03
Payer: MEDICARE

## 2019-11-03 ENCOUNTER — HOSPITAL ENCOUNTER (EMERGENCY)
Age: 69
Discharge: HOME | End: 2019-11-04
Attending: EMERGENCY MEDICINE
Payer: MEDICARE

## 2019-11-03 VITALS
DIASTOLIC BLOOD PRESSURE: 56 MMHG | WEIGHT: 175 LBS | BODY MASS INDEX: 25.92 KG/M2 | HEART RATE: 67 BPM | TEMPERATURE: 99 F | RESPIRATION RATE: 20 BRPM | OXYGEN SATURATION: 98 % | SYSTOLIC BLOOD PRESSURE: 121 MMHG | HEIGHT: 69 IN

## 2019-11-03 DIAGNOSIS — R10.10 PAIN OF UPPER ABDOMEN: ICD-10-CM

## 2019-11-03 DIAGNOSIS — R11.2 NON-INTRACTABLE VOMITING WITH NAUSEA, UNSPECIFIED VOMITING TYPE: Primary | ICD-10-CM

## 2019-11-03 LAB
ABSOLUTE EOS #: 0.03 K/UL (ref 0–0.44)
ABSOLUTE IMMATURE GRANULOCYTE: 0.01 K/UL (ref 0–0.3)
ABSOLUTE LYMPH #: 1.51 K/UL (ref 1.1–3.7)
ABSOLUTE MONO #: 0.29 K/UL (ref 0.1–1.2)
ALBUMIN SERPL-MCNC: 4.2 G/DL (ref 3.5–5.2)
ALBUMIN/GLOBULIN RATIO: ABNORMAL (ref 1–2.5)
ALP BLD-CCNC: 79 U/L (ref 35–104)
ALT SERPL-CCNC: 21 U/L (ref 5–33)
ANION GAP SERPL CALCULATED.3IONS-SCNC: 13 MMOL/L (ref 9–17)
AST SERPL-CCNC: 16 U/L
BASOPHILS # BLD: 1 % (ref 0–2)
BASOPHILS ABSOLUTE: <0.03 K/UL (ref 0–0.2)
BILIRUB SERPL-MCNC: 1.43 MG/DL (ref 0.3–1.2)
BILIRUBIN DIRECT: 0.24 MG/DL
BILIRUBIN, INDIRECT: 1.19 MG/DL (ref 0–1)
BUN BLDV-MCNC: 12 MG/DL (ref 8–23)
BUN/CREAT BLD: 16 (ref 9–20)
CALCIUM SERPL-MCNC: 9.2 MG/DL (ref 8.6–10.4)
CHLORIDE BLD-SCNC: 106 MMOL/L (ref 98–107)
CO2: 22 MMOL/L (ref 20–31)
CREAT SERPL-MCNC: 0.76 MG/DL (ref 0.5–0.9)
DIFFERENTIAL TYPE: ABNORMAL
EOSINOPHILS RELATIVE PERCENT: 1 % (ref 1–4)
GFR AFRICAN AMERICAN: >60 ML/MIN
GFR NON-AFRICAN AMERICAN: >60 ML/MIN
GFR SERPL CREATININE-BSD FRML MDRD: ABNORMAL ML/MIN/{1.73_M2}
GFR SERPL CREATININE-BSD FRML MDRD: ABNORMAL ML/MIN/{1.73_M2}
GLOBULIN: ABNORMAL G/DL (ref 1.5–3.8)
GLUCOSE BLD-MCNC: 185 MG/DL (ref 65–105)
GLUCOSE BLD-MCNC: 208 MG/DL (ref 70–99)
HCT VFR BLD CALC: 34.2 % (ref 36.3–47.1)
HEMOGLOBIN: 11.4 G/DL (ref 11.9–15.1)
IMMATURE GRANULOCYTES: 0 %
LIPASE: 13 U/L (ref 13–60)
LYMPHOCYTES # BLD: 39 % (ref 24–43)
MCH RBC QN AUTO: 28.4 PG (ref 25.2–33.5)
MCHC RBC AUTO-ENTMCNC: 33.3 G/DL (ref 28.4–34.8)
MCV RBC AUTO: 85.1 FL (ref 82.6–102.9)
MONOCYTES # BLD: 8 % (ref 3–12)
NRBC AUTOMATED: 0 PER 100 WBC
PDW BLD-RTO: 13.1 % (ref 11.8–14.4)
PLATELET # BLD: 229 K/UL (ref 138–453)
PLATELET ESTIMATE: ABNORMAL
PMV BLD AUTO: 10.9 FL (ref 8.1–13.5)
POTASSIUM SERPL-SCNC: 3.6 MMOL/L (ref 3.7–5.3)
RBC # BLD: 4.02 M/UL (ref 3.95–5.11)
RBC # BLD: ABNORMAL 10*6/UL
SEG NEUTROPHILS: 52 % (ref 36–65)
SEGMENTED NEUTROPHILS ABSOLUTE COUNT: 2.03 K/UL (ref 1.5–8.1)
SODIUM BLD-SCNC: 141 MMOL/L (ref 135–144)
TOTAL PROTEIN: 6.8 G/DL (ref 6.4–8.3)
WBC # BLD: 3.9 K/UL (ref 3.5–11.3)
WBC # BLD: ABNORMAL 10*3/UL

## 2019-11-03 PROCEDURE — 83690 ASSAY OF LIPASE: CPT

## 2019-11-03 PROCEDURE — 80048 BASIC METABOLIC PNL TOTAL CA: CPT

## 2019-11-03 PROCEDURE — 2580000003 HC RX 258: Performed by: NURSE PRACTITIONER

## 2019-11-03 PROCEDURE — 6360000002 HC RX W HCPCS: Performed by: NURSE PRACTITIONER

## 2019-11-03 PROCEDURE — 80076 HEPATIC FUNCTION PANEL: CPT

## 2019-11-03 PROCEDURE — 96361 HYDRATE IV INFUSION ADD-ON: CPT

## 2019-11-03 PROCEDURE — 96375 TX/PRO/DX INJ NEW DRUG ADDON: CPT

## 2019-11-03 PROCEDURE — 96374 THER/PROPH/DIAG INJ IV PUSH: CPT

## 2019-11-03 PROCEDURE — 74177 CT ABD & PELVIS W/CONTRAST: CPT

## 2019-11-03 PROCEDURE — 6360000004 HC RX CONTRAST MEDICATION: Performed by: NURSE PRACTITIONER

## 2019-11-03 PROCEDURE — 81001 URINALYSIS AUTO W/SCOPE: CPT

## 2019-11-03 PROCEDURE — 85025 COMPLETE CBC W/AUTO DIFF WBC: CPT

## 2019-11-03 PROCEDURE — 99284 EMERGENCY DEPT VISIT MOD MDM: CPT

## 2019-11-03 PROCEDURE — C9113 INJ PANTOPRAZOLE SODIUM, VIA: HCPCS | Performed by: NURSE PRACTITIONER

## 2019-11-03 PROCEDURE — 82947 ASSAY GLUCOSE BLOOD QUANT: CPT

## 2019-11-03 RX ORDER — 0.9 % SODIUM CHLORIDE 0.9 %
500 INTRAVENOUS SOLUTION INTRAVENOUS ONCE
Status: COMPLETED | OUTPATIENT
Start: 2019-11-03 | End: 2019-11-03

## 2019-11-03 RX ORDER — ONDANSETRON 2 MG/ML
4 INJECTION INTRAMUSCULAR; INTRAVENOUS ONCE
Status: COMPLETED | OUTPATIENT
Start: 2019-11-03 | End: 2019-11-03

## 2019-11-03 RX ORDER — SODIUM CHLORIDE 9 MG/ML
INJECTION, SOLUTION INTRAVENOUS CONTINUOUS
Status: DISCONTINUED | OUTPATIENT
Start: 2019-11-03 | End: 2019-11-04 | Stop reason: HOSPADM

## 2019-11-03 RX ORDER — 0.9 % SODIUM CHLORIDE 0.9 %
80 INTRAVENOUS SOLUTION INTRAVENOUS ONCE
Status: COMPLETED | OUTPATIENT
Start: 2019-11-03 | End: 2019-11-03

## 2019-11-03 RX ORDER — SODIUM CHLORIDE 9 MG/ML
10 INJECTION INTRAVENOUS ONCE
Status: COMPLETED | OUTPATIENT
Start: 2019-11-03 | End: 2019-11-03

## 2019-11-03 RX ORDER — PANTOPRAZOLE SODIUM 40 MG/10ML
40 INJECTION, POWDER, LYOPHILIZED, FOR SOLUTION INTRAVENOUS ONCE
Status: COMPLETED | OUTPATIENT
Start: 2019-11-03 | End: 2019-11-03

## 2019-11-03 RX ORDER — SODIUM CHLORIDE 0.9 % (FLUSH) 0.9 %
10 SYRINGE (ML) INJECTION PRN
Status: DISCONTINUED | OUTPATIENT
Start: 2019-11-03 | End: 2019-11-04 | Stop reason: HOSPADM

## 2019-11-03 RX ADMIN — ONDANSETRON 4 MG: 2 INJECTION INTRAMUSCULAR; INTRAVENOUS at 19:38

## 2019-11-03 RX ADMIN — SODIUM CHLORIDE 500 ML: 0.9 INJECTION, SOLUTION INTRAVENOUS at 21:30

## 2019-11-03 RX ADMIN — Medication 10 ML: at 21:50

## 2019-11-03 RX ADMIN — SODIUM CHLORIDE: 9 INJECTION, SOLUTION INTRAVENOUS at 19:37

## 2019-11-03 RX ADMIN — Medication 10 ML: at 19:38

## 2019-11-03 RX ADMIN — IOPAMIDOL 75 ML: 755 INJECTION, SOLUTION INTRAVENOUS at 21:50

## 2019-11-03 RX ADMIN — PANTOPRAZOLE SODIUM 40 MG: 40 INJECTION, POWDER, FOR SOLUTION INTRAVENOUS at 19:38

## 2019-11-03 RX ADMIN — SODIUM CHLORIDE 80 ML: 9 INJECTION, SOLUTION INTRAVENOUS at 21:50

## 2019-11-03 ASSESSMENT — ENCOUNTER SYMPTOMS
ABDOMINAL PAIN: 1
NAUSEA: 1
DIARRHEA: 0
COLOR CHANGE: 0
VOMITING: 1
SHORTNESS OF BREATH: 0
COUGH: 0
BACK PAIN: 0
SORE THROAT: 0

## 2019-11-03 ASSESSMENT — PAIN SCALES - GENERAL: PAINLEVEL_OUTOF10: 5

## 2019-11-04 LAB
-: ABNORMAL
AMORPHOUS: ABNORMAL
BACTERIA: ABNORMAL
BILIRUBIN URINE: NEGATIVE
CASTS UA: ABNORMAL /LPF
COLOR: YELLOW
COMMENT UA: ABNORMAL
CRYSTALS, UA: ABNORMAL /HPF
EPITHELIAL CELLS UA: ABNORMAL /HPF (ref 0–5)
GLUCOSE URINE: NEGATIVE
KETONES, URINE: ABNORMAL
LEUKOCYTE ESTERASE, URINE: ABNORMAL
MUCUS: ABNORMAL
NITRITE, URINE: NEGATIVE
OTHER OBSERVATIONS UA: ABNORMAL
PH UA: 6.5 (ref 5–8)
PROTEIN UA: NEGATIVE
RBC UA: ABNORMAL /HPF (ref 0–2)
RENAL EPITHELIAL, UA: ABNORMAL /HPF
SPECIFIC GRAVITY UA: 1.01 (ref 1–1.03)
TRICHOMONAS: ABNORMAL
TURBIDITY: CLEAR
URINE HGB: NEGATIVE
UROBILINOGEN, URINE: NORMAL
WBC UA: ABNORMAL /HPF (ref 0–5)
YEAST: ABNORMAL

## 2019-11-04 RX ORDER — ONDANSETRON 4 MG/1
4 TABLET, ORALLY DISINTEGRATING ORAL EVERY 8 HOURS PRN
Qty: 20 TABLET | Refills: 0 | Status: SHIPPED | OUTPATIENT
Start: 2019-11-04 | End: 2022-06-29

## 2019-11-04 RX ORDER — FAMOTIDINE 20 MG/1
20 TABLET, FILM COATED ORAL 2 TIMES DAILY
Qty: 20 TABLET | Refills: 0 | Status: SHIPPED | OUTPATIENT
Start: 2019-11-04 | End: 2022-06-29

## 2019-11-06 ENCOUNTER — TELEPHONE (OUTPATIENT)
Dept: PHARMACY | Facility: CLINIC | Age: 69
End: 2019-11-06

## 2020-01-17 ENCOUNTER — APPOINTMENT (OUTPATIENT)
Dept: CT IMAGING | Age: 70
End: 2020-01-17
Payer: MEDICARE

## 2020-01-17 ENCOUNTER — HOSPITAL ENCOUNTER (EMERGENCY)
Age: 70
Discharge: HOME | End: 2020-01-17
Attending: EMERGENCY MEDICINE
Payer: MEDICARE

## 2020-01-17 ENCOUNTER — APPOINTMENT (OUTPATIENT)
Dept: GENERAL RADIOLOGY | Age: 70
End: 2020-01-17
Payer: MEDICARE

## 2020-01-17 VITALS
RESPIRATION RATE: 14 BRPM | HEIGHT: 69 IN | BODY MASS INDEX: 25.43 KG/M2 | SYSTOLIC BLOOD PRESSURE: 136 MMHG | DIASTOLIC BLOOD PRESSURE: 69 MMHG | WEIGHT: 171.7 LBS | HEART RATE: 77 BPM | OXYGEN SATURATION: 100 % | TEMPERATURE: 98.8 F

## 2020-01-17 LAB
ABSOLUTE EOS #: 0.02 K/UL (ref 0–0.4)
ABSOLUTE IMMATURE GRANULOCYTE: 0 K/UL (ref 0–0.3)
ABSOLUTE LYMPH #: 1.34 K/UL (ref 1–4.8)
ABSOLUTE MONO #: 0.07 K/UL (ref 0.2–0.8)
ANION GAP SERPL CALCULATED.3IONS-SCNC: 8 MMOL/L (ref 9–17)
BASOPHILS # BLD: 0 %
BASOPHILS ABSOLUTE: 0 K/UL (ref 0–0.2)
BUN BLDV-MCNC: 12 MG/DL (ref 8–23)
BUN/CREAT BLD: 16 (ref 9–20)
CALCIUM SERPL-MCNC: 8.5 MG/DL (ref 8.6–10.4)
CHLORIDE BLD-SCNC: 107 MMOL/L (ref 98–107)
CO2: 23 MMOL/L (ref 20–31)
CREAT SERPL-MCNC: 0.73 MG/DL (ref 0.5–0.9)
DIFFERENTIAL TYPE: ABNORMAL
DIRECT EXAM: NORMAL
EOSINOPHILS RELATIVE PERCENT: 1 % (ref 1–4)
GFR AFRICAN AMERICAN: >60 ML/MIN
GFR NON-AFRICAN AMERICAN: >60 ML/MIN
GFR SERPL CREATININE-BSD FRML MDRD: ABNORMAL ML/MIN/{1.73_M2}
GFR SERPL CREATININE-BSD FRML MDRD: ABNORMAL ML/MIN/{1.73_M2}
GLUCOSE BLD-MCNC: 233 MG/DL (ref 70–99)
HCT VFR BLD CALC: 31.9 % (ref 36.3–47.1)
HEMOGLOBIN: 10.1 G/DL (ref 11.9–15.1)
IMMATURE GRANULOCYTES: 0 %
LYMPHOCYTES # BLD: 61 % (ref 24–44)
Lab: NORMAL
MCH RBC QN AUTO: 28.3 PG (ref 25.2–33.5)
MCHC RBC AUTO-ENTMCNC: 31.7 G/DL (ref 28.4–34.8)
MCV RBC AUTO: 89.4 FL (ref 82.6–102.9)
MONOCYTES # BLD: 3 % (ref 1–7)
MORPHOLOGY: ABNORMAL
NRBC AUTOMATED: 0 PER 100 WBC
PDW BLD-RTO: 12.6 % (ref 11.8–14.4)
PLATELET # BLD: 118 K/UL (ref 138–453)
PLATELET ESTIMATE: ABNORMAL
PMV BLD AUTO: 12 FL (ref 8.1–13.5)
POTASSIUM SERPL-SCNC: 3.7 MMOL/L (ref 3.7–5.3)
RBC # BLD: 3.57 M/UL (ref 3.95–5.11)
RBC # BLD: ABNORMAL 10*6/UL
SEG NEUTROPHILS: 35 % (ref 36–66)
SEGMENTED NEUTROPHILS ABSOLUTE COUNT: 0.77 K/UL (ref 1.8–7.7)
SODIUM BLD-SCNC: 138 MMOL/L (ref 135–144)
SPECIMEN DESCRIPTION: NORMAL
WBC # BLD: 2.2 K/UL (ref 3.5–11.3)
WBC # BLD: ABNORMAL 10*3/UL

## 2020-01-17 PROCEDURE — 2580000003 HC RX 258: Performed by: EMERGENCY MEDICINE

## 2020-01-17 PROCEDURE — 70498 CT ANGIOGRAPHY NECK: CPT

## 2020-01-17 PROCEDURE — 6360000004 HC RX CONTRAST MEDICATION: Performed by: EMERGENCY MEDICINE

## 2020-01-17 PROCEDURE — 80048 BASIC METABOLIC PNL TOTAL CA: CPT

## 2020-01-17 PROCEDURE — 6370000000 HC RX 637 (ALT 250 FOR IP): Performed by: EMERGENCY MEDICINE

## 2020-01-17 PROCEDURE — 71046 X-RAY EXAM CHEST 2 VIEWS: CPT

## 2020-01-17 PROCEDURE — 85025 COMPLETE CBC W/AUTO DIFF WBC: CPT

## 2020-01-17 PROCEDURE — 99284 EMERGENCY DEPT VISIT MOD MDM: CPT

## 2020-01-17 PROCEDURE — 70450 CT HEAD/BRAIN W/O DYE: CPT

## 2020-01-17 PROCEDURE — 6360000002 HC RX W HCPCS: Performed by: EMERGENCY MEDICINE

## 2020-01-17 PROCEDURE — 87804 INFLUENZA ASSAY W/OPTIC: CPT

## 2020-01-17 PROCEDURE — 96375 TX/PRO/DX INJ NEW DRUG ADDON: CPT

## 2020-01-17 PROCEDURE — 96374 THER/PROPH/DIAG INJ IV PUSH: CPT

## 2020-01-17 RX ORDER — BENZONATATE 100 MG/1
100 CAPSULE ORAL ONCE
Status: COMPLETED | OUTPATIENT
Start: 2020-01-17 | End: 2020-01-17

## 2020-01-17 RX ORDER — SODIUM CHLORIDE 0.9 % (FLUSH) 0.9 %
10 SYRINGE (ML) INJECTION PRN
Status: DISCONTINUED | OUTPATIENT
Start: 2020-01-17 | End: 2020-01-17 | Stop reason: HOSPADM

## 2020-01-17 RX ORDER — ONDANSETRON 2 MG/ML
4 INJECTION INTRAMUSCULAR; INTRAVENOUS ONCE
Status: COMPLETED | OUTPATIENT
Start: 2020-01-17 | End: 2020-01-17

## 2020-01-17 RX ORDER — BENZONATATE 100 MG/1
100 CAPSULE ORAL 2 TIMES DAILY PRN
Qty: 14 CAPSULE | Refills: 0 | Status: SHIPPED | OUTPATIENT
Start: 2020-01-17 | End: 2020-01-24

## 2020-01-17 RX ORDER — GUAIFENESIN 100 MG/5ML
200 SOLUTION ORAL ONCE
Status: COMPLETED | OUTPATIENT
Start: 2020-01-17 | End: 2020-01-17

## 2020-01-17 RX ORDER — 0.9 % SODIUM CHLORIDE 0.9 %
80 INTRAVENOUS SOLUTION INTRAVENOUS ONCE
Status: COMPLETED | OUTPATIENT
Start: 2020-01-17 | End: 2020-01-17

## 2020-01-17 RX ORDER — KETOROLAC TROMETHAMINE 30 MG/ML
30 INJECTION, SOLUTION INTRAMUSCULAR; INTRAVENOUS ONCE
Status: COMPLETED | OUTPATIENT
Start: 2020-01-17 | End: 2020-01-17

## 2020-01-17 RX ORDER — FLUTICASONE PROPIONATE 50 MCG
1 SPRAY, SUSPENSION (ML) NASAL DAILY
Qty: 2 BOTTLE | Refills: 1 | Status: SHIPPED | OUTPATIENT
Start: 2020-01-17 | End: 2022-06-29

## 2020-01-17 RX ORDER — DIPHENHYDRAMINE HYDROCHLORIDE 50 MG/ML
25 INJECTION INTRAMUSCULAR; INTRAVENOUS ONCE
Status: COMPLETED | OUTPATIENT
Start: 2020-01-17 | End: 2020-01-17

## 2020-01-17 RX ORDER — GUAIFENESIN/DEXTROMETHORPHAN 100-10MG/5
5 SYRUP ORAL 3 TIMES DAILY PRN
Qty: 120 ML | Refills: 0 | Status: SHIPPED | OUTPATIENT
Start: 2020-01-17 | End: 2020-01-27

## 2020-01-17 RX ADMIN — ONDANSETRON 4 MG: 2 INJECTION INTRAMUSCULAR; INTRAVENOUS at 18:21

## 2020-01-17 RX ADMIN — BENZONATATE 100 MG: 100 CAPSULE ORAL at 21:05

## 2020-01-17 RX ADMIN — DIPHENHYDRAMINE HYDROCHLORIDE 25 MG: 50 INJECTION, SOLUTION INTRAMUSCULAR; INTRAVENOUS at 18:21

## 2020-01-17 RX ADMIN — IOPAMIDOL 80 ML: 755 INJECTION, SOLUTION INTRAVENOUS at 19:40

## 2020-01-17 RX ADMIN — KETOROLAC TROMETHAMINE 30 MG: 30 INJECTION, SOLUTION INTRAMUSCULAR at 18:21

## 2020-01-17 RX ADMIN — Medication 10 ML: at 19:40

## 2020-01-17 RX ADMIN — SODIUM CHLORIDE 80 ML: 9 INJECTION, SOLUTION INTRAVENOUS at 19:40

## 2020-01-17 RX ADMIN — GUAIFENESIN 200 MG: 100 SOLUTION ORAL at 16:50

## 2020-01-17 ASSESSMENT — PAIN SCALES - GENERAL
PAINLEVEL_OUTOF10: 5
PAINLEVEL_OUTOF10: 5
PAINLEVEL_OUTOF10: 0

## 2020-01-17 ASSESSMENT — ENCOUNTER SYMPTOMS
FACIAL SWELLING: 0
BACK PAIN: 0
ABDOMINAL DISTENTION: 0
SHORTNESS OF BREATH: 0
COUGH: 1
CHEST TIGHTNESS: 0
EYE DISCHARGE: 0
EYE PAIN: 0
ABDOMINAL PAIN: 0

## 2020-01-17 ASSESSMENT — PAIN DESCRIPTION - ORIENTATION: ORIENTATION: POSTERIOR

## 2020-01-17 ASSESSMENT — PAIN DESCRIPTION - FREQUENCY: FREQUENCY: INTERMITTENT

## 2020-01-17 ASSESSMENT — PAIN DESCRIPTION - LOCATION: LOCATION: HEAD

## 2020-01-17 ASSESSMENT — PAIN DESCRIPTION - DESCRIPTORS: DESCRIPTORS: ACHING

## 2020-01-17 ASSESSMENT — PAIN DESCRIPTION - PAIN TYPE: TYPE: ACUTE PAIN

## 2020-01-17 NOTE — ED PROVIDER NOTES
HISTORY       Social History     Tobacco Use    Smoking status: Never Smoker    Smokeless tobacco: Never Used   Substance Use Topics    Alcohol use: No    Drug use: No     PHYSICAL EXAM     INITIAL VITALS: /69   Pulse 77   Temp 98.8 °F (37.1 °C)   Resp 14   Ht 5' 9\" (1.753 m)   Wt 171 lb 11.2 oz (77.9 kg)   SpO2 100%   BMI 25.36 kg/m²    Physical Exam  Vitals signs and nursing note reviewed. Constitutional:       General: She is not in acute distress. Appearance: She is well-developed. She is not diaphoretic. HENT:      Head: Normocephalic and atraumatic. Eyes:      Pupils: Pupils are equal, round, and reactive to light. Neck:      Musculoskeletal: Normal range of motion and neck supple. Cardiovascular:      Rate and Rhythm: Normal rate and regular rhythm. Pulmonary:      Effort: Pulmonary effort is normal.      Breath sounds: Normal breath sounds. Abdominal:      General: Bowel sounds are normal.      Palpations: Abdomen is soft. Musculoskeletal: Normal range of motion. Skin:     General: Skin is warm. Capillary Refill: Capillary refill takes less than 2 seconds. Neurological:      Mental Status: She is alert and oriented to person, place, and time. MEDICAL DECISION MAKING:   The patient is a 37-year-old female who presented to the emergency department secondary cough congestion generalized body aches as well as headache. Patient afebrile on arrival, however she will be evaluated for influenza, pneumonia. Patient no meningeal symptoms no focal neuro deficits on exam.  Imaging and labs obtained patient will be reevaluated. Chest x-ray no infiltrate dissection pneumothorax, CT head no acute findings, rapid influenza negative. Laboratory analysis unremarkable. Patient was reevaluated continued to have headache therefore she received Toradol. She continued to have a headache therefore CTA head was obtained which was no acute findings.   Patient went was GUAIFENESIN-DEXTROMETHORPHAN (ROBITUSSIN DM) 100-10 MG/5ML SYRUP    Take 5 mLs by mouth 3 times daily as needed for Cough     Reddy Zaman MD  Attending Emergency Physician    This note was created with the assistance of a speech-recognition program. While intending to generate a document that actually reflects the content of the visit, no guarantees can be provided that every mistake has been identified and corrected by editing.                     Reddy Zaman MD  50/00/09 2102

## 2020-01-18 NOTE — ED NOTES
20g IV removed from L antecube with no issues. Catheter intact, site covered with cotton ball and tape.      [place] RN  01/17/20 1951

## 2020-01-18 NOTE — ED NOTES
Pt presents to er with c/o headache with congestion. Pt states she has had s/sx for past week. Pt denies fever. Pt states she has had chills with body-aches. Pt a&ox3. Skin warm and dry. Respirations even and non-labored.       Deisy Calero RN  01/17/20 6240

## 2020-01-20 ENCOUNTER — CARE COORDINATION (OUTPATIENT)
Dept: CARE COORDINATION | Age: 70
End: 2020-01-20

## 2020-02-13 RX ORDER — ISOPROPYL ALCOHOL 0.75 G/1
SWAB TOPICAL
Qty: 300 EACH | Refills: 2 | Status: SHIPPED | OUTPATIENT
Start: 2020-02-13 | End: 2020-02-18 | Stop reason: SDUPTHER

## 2020-02-13 RX ORDER — LANCETS
EACH MISCELLANEOUS
Qty: 300 EACH | Refills: 3 | Status: SHIPPED | OUTPATIENT
Start: 2020-02-13 | End: 2020-02-18 | Stop reason: SDUPTHER

## 2020-02-13 RX ORDER — FUROSEMIDE 40 MG/1
TABLET ORAL
Qty: 90 TABLET | Refills: 1 | Status: SHIPPED | OUTPATIENT
Start: 2020-02-13 | End: 2020-02-18 | Stop reason: SDUPTHER

## 2020-02-13 RX ORDER — ALENDRONATE SODIUM 70 MG/1
70 TABLET ORAL
Qty: 4 TABLET | Refills: 3 | Status: SHIPPED | OUTPATIENT
Start: 2020-02-13 | End: 2020-02-18 | Stop reason: SDUPTHER

## 2020-02-18 RX ORDER — LANCETS
EACH MISCELLANEOUS
Qty: 300 EACH | Refills: 3 | Status: SHIPPED | OUTPATIENT
Start: 2020-02-18 | End: 2021-07-14 | Stop reason: SDUPTHER

## 2020-02-18 RX ORDER — ALENDRONATE SODIUM 70 MG/1
70 TABLET ORAL
Qty: 4 TABLET | Refills: 3 | Status: SHIPPED | OUTPATIENT
Start: 2020-02-18 | End: 2020-11-25

## 2020-02-18 RX ORDER — ISOPROPYL ALCOHOL 0.75 G/1
SWAB TOPICAL
Qty: 300 EACH | Refills: 2 | Status: SHIPPED | OUTPATIENT
Start: 2020-02-18 | End: 2021-07-19

## 2020-02-18 RX ORDER — FUROSEMIDE 40 MG/1
TABLET ORAL
Qty: 90 TABLET | Refills: 1 | Status: SHIPPED | OUTPATIENT
Start: 2020-02-18 | End: 2020-11-25

## 2020-03-11 ENCOUNTER — TELEPHONE (OUTPATIENT)
Dept: FAMILY MEDICINE CLINIC | Age: 70
End: 2020-03-11

## 2020-04-20 ENCOUNTER — TELEPHONE (OUTPATIENT)
Dept: PRIMARY CARE CLINIC | Age: 70
End: 2020-04-20

## 2020-06-23 RX ORDER — INSULIN GLARGINE 300 U/ML
INJECTION, SOLUTION SUBCUTANEOUS
Qty: 5 PEN | Refills: 3 | Status: SHIPPED | OUTPATIENT
Start: 2020-06-23 | End: 2020-11-19 | Stop reason: SDUPTHER

## 2020-07-08 ENCOUNTER — OFFICE VISIT (OUTPATIENT)
Dept: FAMILY MEDICINE CLINIC | Age: 70
End: 2020-07-08
Payer: MEDICARE

## 2020-07-08 VITALS
DIASTOLIC BLOOD PRESSURE: 83 MMHG | HEART RATE: 65 BPM | WEIGHT: 175 LBS | HEIGHT: 69 IN | TEMPERATURE: 98.2 F | OXYGEN SATURATION: 99 % | BODY MASS INDEX: 25.92 KG/M2 | SYSTOLIC BLOOD PRESSURE: 148 MMHG

## 2020-07-08 LAB — HBA1C MFR BLD: 8.9 %

## 2020-07-08 PROCEDURE — 2022F DILAT RTA XM EVC RTNOPTHY: CPT | Performed by: FAMILY MEDICINE

## 2020-07-08 PROCEDURE — 1123F ACP DISCUSS/DSCN MKR DOCD: CPT | Performed by: FAMILY MEDICINE

## 2020-07-08 PROCEDURE — G8399 PT W/DXA RESULTS DOCUMENT: HCPCS | Performed by: FAMILY MEDICINE

## 2020-07-08 PROCEDURE — 1036F TOBACCO NON-USER: CPT | Performed by: FAMILY MEDICINE

## 2020-07-08 PROCEDURE — 1090F PRES/ABSN URINE INCON ASSESS: CPT | Performed by: FAMILY MEDICINE

## 2020-07-08 PROCEDURE — 99214 OFFICE O/P EST MOD 30 MIN: CPT | Performed by: FAMILY MEDICINE

## 2020-07-08 PROCEDURE — G8417 CALC BMI ABV UP PARAM F/U: HCPCS | Performed by: FAMILY MEDICINE

## 2020-07-08 PROCEDURE — 3017F COLORECTAL CA SCREEN DOC REV: CPT | Performed by: FAMILY MEDICINE

## 2020-07-08 PROCEDURE — 3052F HG A1C>EQUAL 8.0%<EQUAL 9.0%: CPT | Performed by: FAMILY MEDICINE

## 2020-07-08 PROCEDURE — 4040F PNEUMOC VAC/ADMIN/RCVD: CPT | Performed by: FAMILY MEDICINE

## 2020-07-08 PROCEDURE — 83036 HEMOGLOBIN GLYCOSYLATED A1C: CPT | Performed by: FAMILY MEDICINE

## 2020-07-08 PROCEDURE — G8427 DOCREV CUR MEDS BY ELIG CLIN: HCPCS | Performed by: FAMILY MEDICINE

## 2020-07-08 ASSESSMENT — PATIENT HEALTH QUESTIONNAIRE - PHQ9
SUM OF ALL RESPONSES TO PHQ QUESTIONS 1-9: 0
SUM OF ALL RESPONSES TO PHQ QUESTIONS 1-9: 0
2. FEELING DOWN, DEPRESSED OR HOPELESS: 0
1. LITTLE INTEREST OR PLEASURE IN DOING THINGS: 0
SUM OF ALL RESPONSES TO PHQ9 QUESTIONS 1 & 2: 0

## 2020-07-08 NOTE — PROGRESS NOTES
MISC TEST THREE TIMES DAILY 300 each 3    famotidine (PEPCID) 20 MG tablet Take 1 tablet by mouth 2 times daily 20 tablet 0    potassium chloride (KLOR-CON M) 20 MEQ extended release tablet TAKE 1 TABLET EVERY DAY 90 tablet 1    atorvastatin (LIPITOR) 10 MG tablet Take 1 tablet by mouth daily 30 tablet 11    levothyroxine (SYNTHROID) 50 MCG tablet Take 1 tablet by mouth daily 30 tablet 2    FERREX 150 FORTE 150-1-25 MG-MG-MCG CAPS capsule TAKE 1 CAPSULE BY MOUTH TWICE DAILY 30 capsule 11    acetaZOLAMIDE (DIAMOX) 500 MG extended release capsule       pilocarpine (PILOCAR) 1 % ophthalmic solution       TRAVATAN Z 0.004 % SOLN ophthalmic solution       brimonidine (ALPHAGAN) 0.2 % ophthalmic solution Place 1 drop into both eyes 2 times daily 1 Bottle 0    aspirin (ASPIRIN LOW DOSE) 81 MG EC tablet Take 1 tablet by mouth daily 30 tablet 12    dorzolamide-timolol (COSOPT) 22.3-6.8 MG/ML ophthalmic solution       fluticasone (FLONASE) 50 MCG/ACT nasal spray 1 spray by Each Nostril route daily (Patient not taking: Reported on 7/8/2020) 2 Bottle 1    ondansetron (ZOFRAN ODT) 4 MG disintegrating tablet Take 1 tablet by mouth every 8 hours as needed for Nausea or Vomiting (Patient not taking: Reported on 7/8/2020) 20 tablet 0     No current facility-administered medications for this visit. ALLERGIES:  No Known Allergies    Social History     Tobacco Use    Smoking status: Never Smoker    Smokeless tobacco: Never Used   Substance Use Topics    Alcohol use: No        LDL Calculated (mg/dL)   Date Value   06/20/2018 121     LDL Cholesterol (mg/dL)   Date Value   07/29/2019 60     HDL (mg/dL)   Date Value   07/29/2019 64     BUN (mg/dL)   Date Value   01/17/2020 12     CREATININE (mg/dL)   Date Value   01/17/2020 0.73     Glucose (mg/dL)   Date Value   01/17/2020 233 (H)   06/20/2018 157 (H)     Hemoglobin A1C (%)   Date Value   06/03/2019 6.7     Microalb/Crt.  Ratio (mcg/mg creat)   Date Value   06/03/2019 Nose: No mucosal edema. Mouth/Throat: Oropharynx is clear and moist. No posterior oropharyngeal erythema. Eyes: Conjunctivae and EOM are normal. Pupils are equal, round, and reactive to light. Neck: Normal range of motion. Neck supple. No thyromegaly present. Cardiovascular: Normal rate, regular rhythm and normal heart sounds. No murmur heard. Pulmonary/Chest: Effort normal and breath sounds normal. She has no wheezes. Shehas no rales. Abdominal: Soft. Bowel sounds are normal. She exhibits no distension and no mass. There is no tenderness. There is no rebound and no guarding. Genitourinary/Anorectal:deferred  Musculoskeletal: Normal range of motion. She exhibits no edema or tenderness. Lymphadenopathy: She has no cervical adenopathy. Neurological: She is alert and oriented to person, place, and time. She has normal reflexes. Skin: Skin is warm and dry. No rash noted. Psychiatric: She has a normal mood and affect. Her   behavior is normal.       Assessment:      1. Type 2 diabetes mellitus with hyperglycemia, with long-term current use of insulin ([address]    2. Osteopenia, unspecified location    3. Edema of both legs    4. Gastroesophageal reflux disease with esophagitis    5. Pure hypercholesterolemia    6. Acquired hypothyroidism    7. Other iron deficiency anemia    8. Screening mammogram, encounter for          Plan:      Call or return to clinic prn if these symptoms worsen or fail to improve as anticipated. I have reviewed the instructions with the patient, answering all questions to her satisfaction. Return in about 6 months (around 1/8/2021) for dm.   Orders Placed This Encounter   Procedures    EULALIA DIGITAL SCREEN W OR WO CAD BILATERAL     Standing Status:   Future     Standing Expiration Date:   9/8/2021     Order Specific Question:   Reason for exam:     Answer:   screen    CBC Auto Differential     Standing Status:   Future     Standing Expiration Date:   1/8/2021   Jorgito Andres

## 2020-08-24 ENCOUNTER — HOSPITAL ENCOUNTER (OUTPATIENT)
Dept: MAMMOGRAPHY | Age: 70
Discharge: HOME | End: 2020-08-26
Payer: MEDICARE

## 2020-08-24 PROCEDURE — 77063 BREAST TOMOSYNTHESIS BI: CPT

## 2020-08-25 ENCOUNTER — HOSPITAL ENCOUNTER (OUTPATIENT)
Age: 70
Discharge: HOME | End: 2020-08-25
Payer: MEDICARE

## 2020-08-25 LAB
ABSOLUTE EOS #: 0.07 K/UL (ref 0–0.44)
ABSOLUTE IMMATURE GRANULOCYTE: <0.03 K/UL (ref 0–0.3)
ABSOLUTE LYMPH #: 2.06 K/UL (ref 1.1–3.7)
ABSOLUTE MONO #: 0.32 K/UL (ref 0.1–1.2)
ALBUMIN SERPL-MCNC: 4.1 G/DL (ref 3.5–5.2)
ALBUMIN/GLOBULIN RATIO: 1.6 (ref 1–2.5)
ALP BLD-CCNC: 88 U/L (ref 35–104)
ALT SERPL-CCNC: 20 U/L (ref 5–33)
ANION GAP SERPL CALCULATED.3IONS-SCNC: 10 MMOL/L (ref 9–17)
AST SERPL-CCNC: 17 U/L
BASOPHILS # BLD: 1 % (ref 0–2)
BASOPHILS ABSOLUTE: <0.03 K/UL (ref 0–0.2)
BILIRUB SERPL-MCNC: 0.64 MG/DL (ref 0.3–1.2)
BUN BLDV-MCNC: 24 MG/DL (ref 8–23)
BUN/CREAT BLD: ABNORMAL (ref 9–20)
CALCIUM SERPL-MCNC: 9.4 MG/DL (ref 8.6–10.4)
CHLORIDE BLD-SCNC: 108 MMOL/L (ref 98–107)
CHOLESTEROL/HDL RATIO: 2.4
CHOLESTEROL: 137 MG/DL
CO2: 25 MMOL/L (ref 20–31)
CREAT SERPL-MCNC: 0.97 MG/DL (ref 0.5–0.9)
DIFFERENTIAL TYPE: ABNORMAL
EOSINOPHILS RELATIVE PERCENT: 2 % (ref 1–4)
FERRITIN: 463 UG/L (ref 13–150)
GFR AFRICAN AMERICAN: >60 ML/MIN
GFR NON-AFRICAN AMERICAN: 57 ML/MIN
GFR SERPL CREATININE-BSD FRML MDRD: ABNORMAL ML/MIN/{1.73_M2}
GFR SERPL CREATININE-BSD FRML MDRD: ABNORMAL ML/MIN/{1.73_M2}
GLUCOSE BLD-MCNC: 146 MG/DL (ref 70–99)
HCT VFR BLD CALC: 35.2 % (ref 36.3–47.1)
HDLC SERPL-MCNC: 57 MG/DL
HEMOGLOBIN: 11.2 G/DL (ref 11.9–15.1)
IMMATURE GRANULOCYTES: 0 %
LDL CHOLESTEROL: 68 MG/DL (ref 0–130)
LYMPHOCYTES # BLD: 48 % (ref 24–43)
MAGNESIUM: 2.1 MG/DL (ref 1.6–2.6)
MCH RBC QN AUTO: 28.6 PG (ref 25.2–33.5)
MCHC RBC AUTO-ENTMCNC: 31.8 G/DL (ref 28.4–34.8)
MCV RBC AUTO: 89.8 FL (ref 82.6–102.9)
MONOCYTES # BLD: 8 % (ref 3–12)
NRBC AUTOMATED: 0 PER 100 WBC
PDW BLD-RTO: 13.4 % (ref 11.8–14.4)
PHOSPHORUS: 4 MG/DL (ref 2.6–4.5)
PLATELET # BLD: 182 K/UL (ref 138–453)
PLATELET ESTIMATE: ABNORMAL
PMV BLD AUTO: 12.1 FL (ref 8.1–13.5)
POTASSIUM SERPL-SCNC: 3.8 MMOL/L (ref 3.7–5.3)
RBC # BLD: 3.92 M/UL (ref 3.95–5.11)
RBC # BLD: ABNORMAL 10*6/UL
SEG NEUTROPHILS: 41 % (ref 36–65)
SEGMENTED NEUTROPHILS ABSOLUTE COUNT: 1.72 K/UL (ref 1.5–8.1)
SODIUM BLD-SCNC: 143 MMOL/L (ref 135–144)
THYROXINE, FREE: 0.91 NG/DL (ref 0.93–1.7)
TOTAL PROTEIN: 6.7 G/DL (ref 6.4–8.3)
TRIGL SERPL-MCNC: 60 MG/DL
TSH SERPL DL<=0.05 MIU/L-ACNC: 0.91 MIU/L (ref 0.3–5)
VITAMIN D 25-HYDROXY: 37.5 NG/ML (ref 30–100)
VLDLC SERPL CALC-MCNC: NORMAL MG/DL (ref 1–30)
WBC # BLD: 4.2 K/UL (ref 3.5–11.3)
WBC # BLD: ABNORMAL 10*3/UL

## 2020-08-25 PROCEDURE — 82728 ASSAY OF FERRITIN: CPT

## 2020-08-25 PROCEDURE — 83735 ASSAY OF MAGNESIUM: CPT

## 2020-08-25 PROCEDURE — 80053 COMPREHEN METABOLIC PANEL: CPT

## 2020-08-25 PROCEDURE — 82306 VITAMIN D 25 HYDROXY: CPT

## 2020-08-25 PROCEDURE — 84443 ASSAY THYROID STIM HORMONE: CPT

## 2020-08-25 PROCEDURE — 84100 ASSAY OF PHOSPHORUS: CPT

## 2020-08-25 PROCEDURE — 85025 COMPLETE CBC W/AUTO DIFF WBC: CPT

## 2020-08-25 PROCEDURE — 84439 ASSAY OF FREE THYROXINE: CPT

## 2020-08-25 PROCEDURE — 36415 COLL VENOUS BLD VENIPUNCTURE: CPT

## 2020-08-25 PROCEDURE — 80061 LIPID PANEL: CPT

## 2020-09-18 RX ORDER — METOCLOPRAMIDE 5 MG/1
5 TABLET ORAL DAILY
Qty: 90 TABLET | Refills: 1 | Status: SHIPPED | OUTPATIENT
Start: 2020-09-18 | End: 2021-12-03

## 2020-11-19 RX ORDER — INSULIN GLARGINE 300 U/ML
INJECTION, SOLUTION SUBCUTANEOUS
Qty: 5 PEN | Refills: 2 | COMMUNITY
Start: 2020-11-19 | End: 2021-02-24

## 2020-11-24 RX ORDER — ATORVASTATIN CALCIUM 10 MG/1
TABLET, FILM COATED ORAL
Qty: 90 TABLET | Refills: 10 | Status: SHIPPED | OUTPATIENT
Start: 2020-11-24 | End: 2020-11-25 | Stop reason: SDUPTHER

## 2020-11-24 NOTE — TELEPHONE ENCOUNTER
Csome Jewell is calling to request a refill on the following medication(s):    Last Visit Date (If Applicable):  9/0/8912    Next Visit Date:    11/24/2020    Medication Request:  Requested Prescriptions     Pending Prescriptions Disp Refills    atorvastatin (LIPITOR) 10 MG tablet [Pharmacy Med Name: ATORVASTATIN 10 MG TABLET] 90 tablet 10     Sig: TAKE ONE TABLET BY MOUTH DAILY

## 2020-11-25 RX ORDER — ALENDRONATE SODIUM 70 MG/1
70 TABLET ORAL
Qty: 12 TABLET | Refills: 3 | Status: SHIPPED | OUTPATIENT
Start: 2020-11-25 | End: 2021-12-03

## 2020-11-25 RX ORDER — ATORVASTATIN CALCIUM 10 MG/1
TABLET, FILM COATED ORAL
Qty: 90 TABLET | Refills: 3 | Status: SHIPPED | OUTPATIENT
Start: 2020-11-25 | End: 2021-12-03

## 2020-11-25 RX ORDER — FUROSEMIDE 40 MG/1
TABLET ORAL
Qty: 90 TABLET | Refills: 1 | Status: SHIPPED | OUTPATIENT
Start: 2020-11-25 | End: 2021-12-03

## 2021-01-08 ENCOUNTER — NURSE TRIAGE (OUTPATIENT)
Dept: OTHER | Facility: CLINIC | Age: 71
End: 2021-01-08

## 2021-01-08 NOTE — TELEPHONE ENCOUNTER
Reason for Disposition   MODERATE pain (e.g., symptoms interfere with work or school, limping) and present > 3 days    Answer Assessment - Initial Assessment Questions  1. LOCATION and RADIATION: \"Where is the pain located? \"       Left knee pain radiating up into left thigh, right knee slightly painful but no severe    2. QUALITY: \"What does the pain feel like? \"  (e.g., sharp, dull, aching, burning)      Sharp    3. SEVERITY: \"How bad is the pain? \" \"What does it keep you from doing? \"   (Scale 1-10; or mild, moderate, severe)    -  MILD (1-3): doesn't interfere with normal activities     -  MODERATE (4-7): interferes with normal activities (e.g., work or school) or awakens from sleep, limping     -  SEVERE (8-10): excruciating pain, unable to do any normal activities, unable to walk      9/10 to left knee, mild pain to right knee    4. ONSET: \"When did the pain start? \" \"Does it come and go, or is it there all the time? \"      2 weeks, is getting worse    5. RECURRENT: \"Have you had this pain before? \" If so, ask: \"When, and what happened then? \"      No    6. SETTING: \"Has there been any recent work, exercise or other activity that involved that part of the body? \"       Unsure if she injured knees stomping at [employer]    7. AGGRAVATING FACTORS: \"What makes the knee pain worse? \" (e.g., walking, climbing stairs, running)      Walking    8. ASSOCIATED SYMPTOMS: \"Is there any swelling or redness of the knee? \"      Mild swelling    9. OTHER SYMPTOMS: \"Do you have any other symptoms? \" (e.g., chest pain, difficulty breathing, fever, calf pain)      No    10. PREGNANCY: \"Is there any chance you are pregnant? \" \"When was your last menstrual period? \"        n/a    Protocols used: KNEE PAIN-ADULT-[place]      Patient called pre-service center [facility] with red flag complaint.      Brief description of triage: Patient c/o bilateral knee pain x 2 weeks, states mild sweling, patient reports left knee pain worse than right knee    Triage indicates for patient to be seen within 3 days     Care advice provided, patient verbalizes understanding; denies any other questions or concerns; instructed to call back for any new or worsening symptoms. Writer provided warm transfer at [facility] for appointment scheduling. Attention Provider: Thank you for allowing me to participate in the care of your patient. The patient was connected to triage in response to information provided to the [facility]. Please do not respond through this encounter as the response is not directed to a shared pool.

## 2021-01-29 ENCOUNTER — HOSPITAL ENCOUNTER (OUTPATIENT)
Age: 71
Setting detail: SPECIMEN
Discharge: HOME | End: 2021-01-29
Payer: MEDICARE

## 2021-01-29 ENCOUNTER — OFFICE VISIT (OUTPATIENT)
Dept: FAMILY MEDICINE CLINIC | Age: 71
End: 2021-01-29
Payer: MEDICARE

## 2021-01-29 VITALS
DIASTOLIC BLOOD PRESSURE: 74 MMHG | WEIGHT: 176 LBS | SYSTOLIC BLOOD PRESSURE: 132 MMHG | HEART RATE: 65 BPM | HEIGHT: 69 IN | BODY MASS INDEX: 26.07 KG/M2 | OXYGEN SATURATION: 100 %

## 2021-01-29 DIAGNOSIS — D50.9 IRON DEFICIENCY ANEMIA, UNSPECIFIED IRON DEFICIENCY ANEMIA TYPE: ICD-10-CM

## 2021-01-29 DIAGNOSIS — R60.0 EDEMA OF BOTH LEGS: ICD-10-CM

## 2021-01-29 DIAGNOSIS — Z79.4 TYPE 2 DIABETES MELLITUS WITH HYPERGLYCEMIA, WITH LONG-TERM CURRENT USE OF INSULIN (HCC): Primary | ICD-10-CM

## 2021-01-29 DIAGNOSIS — E11.65 TYPE 2 DIABETES MELLITUS WITH HYPERGLYCEMIA, WITH LONG-TERM CURRENT USE OF INSULIN (HCC): Primary | ICD-10-CM

## 2021-01-29 DIAGNOSIS — R79.9 ELEVATED BUN: ICD-10-CM

## 2021-01-29 DIAGNOSIS — D50.8 OTHER IRON DEFICIENCY ANEMIA: ICD-10-CM

## 2021-01-29 DIAGNOSIS — M54.32 SCIATICA, LEFT SIDE: ICD-10-CM

## 2021-01-29 LAB
ABSOLUTE EOS #: 0.05 K/UL (ref 0–0.44)
ABSOLUTE IMMATURE GRANULOCYTE: <0.03 K/UL (ref 0–0.3)
ABSOLUTE LYMPH #: 1.9 K/UL (ref 1.1–3.7)
ABSOLUTE MONO #: 0.43 K/UL (ref 0.1–1.2)
ALBUMIN SERPL-MCNC: 4.2 G/DL (ref 3.5–5.2)
ALBUMIN/GLOBULIN RATIO: 1.6 (ref 1–2.5)
ALP BLD-CCNC: 88 U/L (ref 35–104)
ALT SERPL-CCNC: 19 U/L (ref 5–33)
ANION GAP SERPL CALCULATED.3IONS-SCNC: 10 MMOL/L (ref 9–17)
AST SERPL-CCNC: 19 U/L
BASOPHILS # BLD: 1 % (ref 0–2)
BASOPHILS ABSOLUTE: <0.03 K/UL (ref 0–0.2)
BILIRUB SERPL-MCNC: 0.74 MG/DL (ref 0.3–1.2)
BUN BLDV-MCNC: 21 MG/DL (ref 8–23)
BUN/CREAT BLD: ABNORMAL (ref 9–20)
CALCIUM SERPL-MCNC: 9.7 MG/DL (ref 8.6–10.4)
CHLORIDE BLD-SCNC: 108 MMOL/L (ref 98–107)
CO2: 26 MMOL/L (ref 20–31)
CREAT SERPL-MCNC: 0.87 MG/DL (ref 0.5–0.9)
CREATININE URINE POCT: 50
DIFFERENTIAL TYPE: ABNORMAL
EOSINOPHILS RELATIVE PERCENT: 1 % (ref 1–4)
GFR AFRICAN AMERICAN: >60 ML/MIN
GFR NON-AFRICAN AMERICAN: >60 ML/MIN
GFR SERPL CREATININE-BSD FRML MDRD: ABNORMAL ML/MIN/{1.73_M2}
GFR SERPL CREATININE-BSD FRML MDRD: ABNORMAL ML/MIN/{1.73_M2}
GLUCOSE BLD-MCNC: 69 MG/DL (ref 70–99)
HBA1C MFR BLD: 6.9 %
HCT VFR BLD CALC: 35.5 % (ref 36.3–47.1)
HEMOGLOBIN: 11 G/DL (ref 11.9–15.1)
IMMATURE GRANULOCYTES: 0 %
LYMPHOCYTES # BLD: 46 % (ref 24–43)
MCH RBC QN AUTO: 27.6 PG (ref 25.2–33.5)
MCHC RBC AUTO-ENTMCNC: 31 G/DL (ref 28.4–34.8)
MCV RBC AUTO: 89.2 FL (ref 82.6–102.9)
MICROALBUMIN/CREAT 24H UR: 10 MG/G{CREAT}
MICROALBUMIN/CREAT UR-RTO: NORMAL
MONOCYTES # BLD: 10 % (ref 3–12)
NRBC AUTOMATED: 0 PER 100 WBC
PDW BLD-RTO: 13.2 % (ref 11.8–14.4)
PLATELET # BLD: 178 K/UL (ref 138–453)
PLATELET ESTIMATE: ABNORMAL
PMV BLD AUTO: 12.9 FL (ref 8.1–13.5)
POTASSIUM SERPL-SCNC: 4.1 MMOL/L (ref 3.7–5.3)
RBC # BLD: 3.98 M/UL (ref 3.95–5.11)
RBC # BLD: ABNORMAL 10*6/UL
SEG NEUTROPHILS: 42 % (ref 36–65)
SEGMENTED NEUTROPHILS ABSOLUTE COUNT: 1.73 K/UL (ref 1.5–8.1)
SODIUM BLD-SCNC: 144 MMOL/L (ref 135–144)
TOTAL PROTEIN: 6.9 G/DL (ref 6.4–8.3)
WBC # BLD: 4.1 K/UL (ref 3.5–11.3)
WBC # BLD: ABNORMAL 10*3/UL

## 2021-01-29 PROCEDURE — 82044 UR ALBUMIN SEMIQUANTITATIVE: CPT | Performed by: FAMILY MEDICINE

## 2021-01-29 PROCEDURE — 1090F PRES/ABSN URINE INCON ASSESS: CPT | Performed by: FAMILY MEDICINE

## 2021-01-29 PROCEDURE — G8399 PT W/DXA RESULTS DOCUMENT: HCPCS | Performed by: FAMILY MEDICINE

## 2021-01-29 PROCEDURE — 1123F ACP DISCUSS/DSCN MKR DOCD: CPT | Performed by: FAMILY MEDICINE

## 2021-01-29 PROCEDURE — 3044F HG A1C LEVEL LT 7.0%: CPT | Performed by: FAMILY MEDICINE

## 2021-01-29 PROCEDURE — G8484 FLU IMMUNIZE NO ADMIN: HCPCS | Performed by: FAMILY MEDICINE

## 2021-01-29 PROCEDURE — 99214 OFFICE O/P EST MOD 30 MIN: CPT | Performed by: FAMILY MEDICINE

## 2021-01-29 PROCEDURE — 4040F PNEUMOC VAC/ADMIN/RCVD: CPT | Performed by: FAMILY MEDICINE

## 2021-01-29 PROCEDURE — 1036F TOBACCO NON-USER: CPT | Performed by: FAMILY MEDICINE

## 2021-01-29 PROCEDURE — 2022F DILAT RTA XM EVC RTNOPTHY: CPT | Performed by: FAMILY MEDICINE

## 2021-01-29 PROCEDURE — 3017F COLORECTAL CA SCREEN DOC REV: CPT | Performed by: FAMILY MEDICINE

## 2021-01-29 PROCEDURE — G8417 CALC BMI ABV UP PARAM F/U: HCPCS | Performed by: FAMILY MEDICINE

## 2021-01-29 PROCEDURE — 83036 HEMOGLOBIN GLYCOSYLATED A1C: CPT | Performed by: FAMILY MEDICINE

## 2021-01-29 PROCEDURE — G8427 DOCREV CUR MEDS BY ELIG CLIN: HCPCS | Performed by: FAMILY MEDICINE

## 2021-01-29 ASSESSMENT — PATIENT HEALTH QUESTIONNAIRE - PHQ9
SUM OF ALL RESPONSES TO PHQ QUESTIONS 1-9: 0
SUM OF ALL RESPONSES TO PHQ9 QUESTIONS 1 & 2: 0
1. LITTLE INTEREST OR PLEASURE IN DOING THINGS: 0

## 2021-01-29 NOTE — PROGRESS NOTES
[facility]  [address] [place] [address] [address] [place] [address]  Dept: [phone]      Bisi Santiago is a 78 y.o. female who presents today for follow up on her  medical conditions as noted below.       Chief Complaint   Patient presents with    Diabetes       Patient Active Problem List:     Type 2 diabetes mellitus with hyperglycemia, with long-term current use of insulin ([address]     Diabetic polyneuropathy associated with type 1 diabetes mellitus ([address]     Diabetic ulcer of left foot associated with diabetes mellitus due to underlying condition, with fat layer exposed ([address]     Past Medical History:   Diagnosis Date    Diabetic polyneuropathy associated with type 1 diabetes mellitus ([address] 3/27/2019    Diabetic ulcer of left foot associated with diabetes mellitus due to underlying condition, with fat layer exposed ([address] 3/27/2019    Type 2 diabetes mellitus with hyperglycemia, with long-term current use of insulin ([address] 7/31/2018    Type II or unspecified type diabetes mellitus without mention of complication, not stated as uncontrolled       Past Surgical History:   Procedure Laterality Date    FOOT SURGERY  +10years    R foot      Family History   Problem Relation Age of Onset    Diabetes Mother     Diabetes Brother        Current Outpatient Medications   Medication Sig Dispense Refill    alendronate (FOSAMAX) 70 MG tablet TAKE 1 TABLET BY MOUTH EVERY 7 DAYS 12 tablet 3    furosemide (LASIX) 40 MG tablet TAKE 1 TABLET EVERY DAY 90 tablet 1    atorvastatin (LIPITOR) 10 MG tablet TAKE ONE TABLET BY MOUTH DAILY 90 tablet 3    Insulin Glargine, 1 Unit Dial, (TOUJEO SOLOSTAR) 300 UNIT/ML SOPN 50 u sq daily 5 pen 2    Insulin Pen Needle (BD PEN NEEDLE DELANEY U/F) 32G X 4 MM MISC Inject 1 each into the skin daily 100 each 3    Alcohol Swabs (B-D SINGLE USE SWABS REGULAR) PADS USE THREE TIMES DAILY 300 each 2    blood glucose test strips (ACCU-CHEK Head: Normocephalic and atraumatic. Right Ear: External ear normal. Tympanic membrane is not erythematous. No middle ear effusion. Left Ear: External ear normal. Tympanic membrane is not erythematous. No middle ear effusion. Nose: No mucosal edema. Mouth/Throat: Oropharynx is clear and moist. No posterior oropharyngeal erythema. Eyes: Conjunctivae and EOM are normal. Pupils are equal, round, and reactive to light. Neck: Normal range of motion. Neck supple. No thyromegaly present. Cardiovascular: Normal rate, regular rhythm and normal heart sounds. No murmur heard. Pulmonary/Chest: Effort normal and breath sounds normal. She has no wheezes. Shehas no rales. Abdominal: Soft. Bowel sounds are normal. She exhibits no distension and no mass. There is no tenderness. There is no rebound and no guarding. Genitourinary/Anorectal:deferred  Musculoskeletal: Normal range of motion. She exhibits no edema or tenderness. Lymphadenopathy: She has no cervical adenopathy. Neurological: She is alert and oriented to person, place, and time. She has normal reflexes. Skin: Skin is warm and dry. No rash noted. Psychiatric: She has a normal mood and affect. Her   behavior is normal.       Assessment:      1. Type 2 diabetes mellitus with hyperglycemia, with long-term current use of insulin ([address]    2. Edema of both legs    3. Other iron deficiency anemia    4. Elevated BUN    5. Iron deficiency anemia, unspecified iron deficiency anemia type    6. Sciatica, left side          Plan:      Call or return to clinic prn if these symptoms worsen or fail to improve as anticipated. I have reviewed the instructions with the patient, answering all questions to her satisfaction. Return in about 6 months (around 7/29/2021).   Orders Placed This Encounter   Procedures    XR LUMBAR SPINE (2-3 VIEWS)     Standing Status:   Future     Standing Expiration Date:   1/29/2022     Order Specific Question:   Reason for exam:     Answer:   pain down left leg from back    CBC Auto Differential     Standing Status:   Future     Standing Expiration Date:   7/29/2021    Comprehensive Metabolic Panel     Standing Status:   Future     Standing Expiration Date:   7/29/2021    POCT glycosylated hemoglobin (Hb A1C)    POCT microalbumin     No orders of the defined types were placed in this encounter.       Electronically signed by Nazario Chowdhury DO on 1/29/2021 at 9:29 AM

## 2021-02-01 DIAGNOSIS — M54.32 SCIATICA, LEFT SIDE: ICD-10-CM

## 2021-02-09 DIAGNOSIS — R93.7 ABNORMAL X-RAY OF LUMBAR SPINE: Primary | ICD-10-CM

## 2021-02-23 DIAGNOSIS — Z79.4 TYPE 2 DIABETES MELLITUS WITH HYPERGLYCEMIA, WITH LONG-TERM CURRENT USE OF INSULIN (HCC): ICD-10-CM

## 2021-02-23 DIAGNOSIS — E11.65 TYPE 2 DIABETES MELLITUS WITH HYPERGLYCEMIA, WITH LONG-TERM CURRENT USE OF INSULIN (HCC): ICD-10-CM

## 2021-02-23 NOTE — TELEPHONE ENCOUNTER
Lay Quiroz is calling to request a refill on the following medication(s):    Medication Request:  Requested Prescriptions     Pending Prescriptions Disp Refills    Insulin Glargine, 1 Unit Dial, (TOUJEO SOLOSTAR) 300 UNIT/ML SOPN [Pharmacy Med Name: [facility] 300 UNIT/ML]  2     Sig: INJECT 50 UNITS UNDER THE SKIN AT BEDTIME       Last Visit Date (If Applicable):  6/88/6714    Next Visit Date:    Visit date not found Progress Note - Yefri Smith 1961, 56 y o  male MRN: [id number]    Unit/Bed#: -01 Encounter: [id number]    Primary Care Provider: Mirtha Mc MD   Date and time admitted to hospital: 10/16/2018  1:05 PM        DOS: 10/17/2018      * Abscess of left groin   Assessment & Plan    · CT with evidence of left inguinal fluid collection with foci of air  · General surgery is primary  · S/p left inguinal node excision 1/2018 with seroma needing drain placed, this was removed and patient now experiencing erythema over inguinal site  · Pt to go to OR today for I&D of abscess   · Continue IV clindamycin and vancomycin for abx coverage   · PRN pain management per surgery      Essential hypertension   Assessment & Plan    · Appears improved today, however still mildly elevated, could be secondary to pain  · Continue amlodipine, cardura, eplerenone, bystolic and PRN hydralazine  · Will restart patient's lasix today   · Monitor     Splenic lesion   Assessment & Plan    · POA, noted on CT scan, 8 mm lesion  · Will need follow up MRI in next 6 months as an outpatient     History of DVT (deep vein thrombosis)   Assessment & Plan    · Pt on Eliquis, on hold due to surgical procedure today  · Restart a/c per surgery recommendations   · Pt to see hematology as an outpatient for recommendations of continued a/c as he has completed 6 months of Eliquis therapy for provoked DVT  · Monitor     AGUS (obstructive sleep apnea)   Assessment & Plan    · Continue Cpap at bedtime     Stage 3 chronic kidney disease ([facility] [address] )   Assessment & Plan    · Cr   Worsened slightly today to 1 44, however still within patient's baseline  · Baseline per records around 1 4-1 5   · Would decrease rate of IVF hydration and monitor BMP in the AM     Chronic diastolic CHF (congestive heart failure) ([facility] [address] )   Assessment & Plan    · Appears euvolemic on exam   · Will restart patient's lasix and monitor  · Gentle IVF for acute infection     COPD (chronic obstructive pulmonary disease) (HCC)   Assessment & Plan    · Does not appear to be in any acute exacerbation   · Continue Breo and PRN albuterol inhaler  · Will add PRN neb treatments        VTE Pharmacologic Prophylaxis:   Pharmacologic: Eliquis on hold due to surgical procedure, resume per surgery recommendations  Mechanical VTE Prophylaxis in Place: Yes    Patient Centered Rounds: I have performed bedside rounds with nursing staff today  Discussions with Specialists or Other Care Team Provider:  Discussed with General surgery, RN, cm and reviewed previous notes    Education and Discussions with Family / Patient:  Discussed with patient at bedside, no friends or family members present at time of evaluation    Time Spent for Care: 30 minutes  More than 50% of total time spent on counseling and coordination of care as described above  Current Length of Stay: 1 day(s)    Current Patient Status: Inpatient   Certification Statement: The patient will continue to require additional inpatient hospital stay due to I and D of left inguinal abscess to be performed today    Discharge Plan:  Per surgery    Code Status: Level 1 - Full Code      Subjective:   Patient reports that he was having left inguinal discomfort but was just recently given some pain medication in his pain is currently a 3/10  He denies any other review of symptoms including lightheadedness, chest pain, shortness of breath, abdominal pain, nausea, vomiting, diarrhea, constipation or urinary difficulties  Objective:     Vitals:   Temp (24hrs), Av 5 °F (36 9 °C), Min:98 2 °F (36 8 °C), Max:98 7 °F (37 1 °C)    Temp:  [98 2 °F (36 8 °C)-98 7 °F (37 1 °C)] 98 2 °F (36 8 °C)  HR:  [55-73] 55  Resp:  [16-22] 18  BP: (160-214)/() 163/87  SpO2:  [95 %-99 %] 96 %  Body mass index is 31 32 kg/m²  Input and Output Summary (last 24 hours):        Intake/Output Summary (Last 24 hours) at 10/17/18 1310  Last data filed at 10/17/18 0910   Gross per 24 hour   Intake               50 ml   Output              650 ml   Net             -600 ml       Physical Exam:     Physical Exam   Constitutional: No distress  Patient is in no acute distress lying in his hospital bed resting comfortably   HENT:   Head: Normocephalic and atraumatic  Eyes: Conjunctivae are normal  No scleral icterus  Cardiovascular: Normal rate, regular rhythm and intact distal pulses  Pulmonary/Chest: Effort normal and breath sounds normal  No respiratory distress  He has no wheezes  He has no rales  Abdominal: Soft  Bowel sounds are normal  He exhibits no distension  There is no tenderness  There is no rebound  Musculoskeletal: He exhibits no edema  Appears euvolemic   Neurological: He is alert  Skin: Skin is warm and dry  He is not diaphoretic  There is erythema  Psychiatric: He has a normal mood and affect  Vitals reviewed  Additional Data:     Labs:      Results from last 7 days  Lab Units 10/17/18  0511 10/16/18  1354   WBC Thousand/uL 12 54* 12 51*   HEMOGLOBIN g/dL 12 3 13 7   HEMATOCRIT % 37 9 41 5   PLATELETS Thousands/uL 320 326   NEUTROS PCT %  --  72   LYMPHS PCT %  --  14   MONOS PCT %  --  10   EOS PCT %  --  3       Results from last 7 days  Lab Units 10/17/18  0511   SODIUM mmol/L 139   POTASSIUM mmol/L 3 7   CHLORIDE mmol/L 102   CO2 mmol/L 30   BUN mg/dL 21   CREATININE mg/dL 1 44*   CALCIUM mg/dL 8 9   ALK PHOS U/L 85   ALT U/L 29   AST U/L 18       Results from last 7 days  Lab Units 10/17/18  0551   INR  1 24*       * I Have Reviewed All Lab Data Listed Above  * Additional Pertinent Lab Tests Reviewed:  All Labs Within Last 24 Hours Reviewed    Imaging:    Imaging Reports Reviewed Today Include: CT abdomen/pelvis  Imaging Personally Reviewed by Myself Includes:  None    Recent Cultures (last 7 days):           Last 24 Hours Medication List:     Current Facility-Administered Medications:  acetaminophen 650 mg Oral Q6H PRN Mike Mayfield PA-C albuterol 2 puff Inhalation Q6H PRN Carol Jain MD    amLODIPine 10 mg Oral Daily Carol Jain MD    aspirin 81 mg Oral Daily Carol Jain MD    atorvastatin 40 mg Oral HS Carol Jain MD    cholecalciferol 1,000 Units Oral Daily Carol Jain MD    clindamycin 600 mg Intravenous Q8H Adrienne Dewitt PA-C Last Rate: 600 mg (10/17/18 1019)   doxazosin 8 mg Oral HS Carol Jain MD    eplerenone 50 mg Oral Daily Carol Jain MD    escitalopram 10 mg Oral Daily Carol Jain MD    ferrous sulfate 325 mg Oral Daily With Breakfast Carol Jain MD    fish oil 1,000 mg Oral HS Carol Jain MD    fluticasone 1 spray Nasal BID Carol Jain MD    fluticasone-vilanterol 1 puff Inhalation Daily Carol Jain MD    hydrALAZINE 5 mg Intravenous Q6H PRN Carol Jain MD    HYDROmorphone 0 2 mg Intravenous Q3H PRN Adrienne Dewitt PA-C    LORazepam 0 5 mg Oral Q6H PRN Carol Jain MD    magnesium oxide 400 mg Oral BID Carol Jain MD    nebivolol 20 mg Oral BID Carol Jain MD    ondansetron 4 mg Intravenous Q6H PRN Adrienne Dewitt PA-C    potassium chloride 20 mEq Oral Daily Carol Jain MD    sodium chloride 50 mL/hr Intravenous Continuous Elias Marin PA-C Last Rate: 50 mL/hr (10/17/18 1213)   vancomycin 15 mg/kg Intravenous Q12H Adrienne Dewitt PA-C Last Rate: 1,250 mg (10/17/18 0405)        Today, Patient Was Seen By: Elias Marin PA-C    ** Please Note: Dictation voice to text software may have been used in the creation of this document   **

## 2021-02-24 RX ORDER — INSULIN GLARGINE 300 U/ML
INJECTION, SOLUTION SUBCUTANEOUS
Qty: 5 PEN | Refills: 2 | Status: SHIPPED | OUTPATIENT
Start: 2021-02-24 | End: 2021-06-14 | Stop reason: SDUPTHER

## 2021-06-14 DIAGNOSIS — Z79.4 TYPE 2 DIABETES MELLITUS WITH HYPERGLYCEMIA, WITH LONG-TERM CURRENT USE OF INSULIN (HCC): ICD-10-CM

## 2021-06-14 DIAGNOSIS — E11.65 TYPE 2 DIABETES MELLITUS WITH HYPERGLYCEMIA, WITH LONG-TERM CURRENT USE OF INSULIN (HCC): ICD-10-CM

## 2021-06-15 RX ORDER — INSULIN GLARGINE 300 U/ML
INJECTION, SOLUTION SUBCUTANEOUS
Qty: 2 PEN | Refills: 5 | COMMUNITY
Start: 2021-06-15 | End: 2022-06-29

## 2021-06-21 ENCOUNTER — OFFICE VISIT (OUTPATIENT)
Dept: FAMILY MEDICINE CLINIC | Age: 71
End: 2021-06-21
Payer: MEDICARE

## 2021-06-21 VITALS
SYSTOLIC BLOOD PRESSURE: 128 MMHG | HEART RATE: 60 BPM | HEIGHT: 69 IN | WEIGHT: 178 LBS | DIASTOLIC BLOOD PRESSURE: 73 MMHG | BODY MASS INDEX: 26.36 KG/M2 | OXYGEN SATURATION: 100 %

## 2021-06-21 DIAGNOSIS — L97.509: ICD-10-CM

## 2021-06-21 DIAGNOSIS — E78.00 PURE HYPERCHOLESTEROLEMIA: ICD-10-CM

## 2021-06-21 DIAGNOSIS — D50.9 IRON DEFICIENCY ANEMIA, UNSPECIFIED IRON DEFICIENCY ANEMIA TYPE: ICD-10-CM

## 2021-06-21 DIAGNOSIS — R79.9 ELEVATED BUN: ICD-10-CM

## 2021-06-21 DIAGNOSIS — E11.65 TYPE 2 DIABETES MELLITUS WITH HYPERGLYCEMIA, WITH LONG-TERM CURRENT USE OF INSULIN (HCC): Primary | ICD-10-CM

## 2021-06-21 DIAGNOSIS — E55.9 VITAMIN D DEFICIENCY: ICD-10-CM

## 2021-06-21 DIAGNOSIS — E03.9 ACQUIRED HYPOTHYROIDISM: ICD-10-CM

## 2021-06-21 DIAGNOSIS — Z79.4 TYPE 2 DIABETES MELLITUS WITH HYPERGLYCEMIA, WITH LONG-TERM CURRENT USE OF INSULIN (HCC): Primary | ICD-10-CM

## 2021-06-21 DIAGNOSIS — D50.8 OTHER IRON DEFICIENCY ANEMIA: ICD-10-CM

## 2021-06-21 DIAGNOSIS — Z12.31 SCREENING MAMMOGRAM, ENCOUNTER FOR: ICD-10-CM

## 2021-06-21 DIAGNOSIS — Z78.0 POST-MENOPAUSAL: ICD-10-CM

## 2021-06-21 DIAGNOSIS — K21.00 GASTROESOPHAGEAL REFLUX DISEASE WITH ESOPHAGITIS WITHOUT HEMORRHAGE: ICD-10-CM

## 2021-06-21 DIAGNOSIS — Z12.11 SCREEN FOR COLON CANCER: ICD-10-CM

## 2021-06-21 DIAGNOSIS — R60.0 EDEMA OF BOTH LEGS: ICD-10-CM

## 2021-06-21 PROCEDURE — 3044F HG A1C LEVEL LT 7.0%: CPT | Performed by: FAMILY MEDICINE

## 2021-06-21 PROCEDURE — G8417 CALC BMI ABV UP PARAM F/U: HCPCS | Performed by: FAMILY MEDICINE

## 2021-06-21 PROCEDURE — 1090F PRES/ABSN URINE INCON ASSESS: CPT | Performed by: FAMILY MEDICINE

## 2021-06-21 PROCEDURE — 1123F ACP DISCUSS/DSCN MKR DOCD: CPT | Performed by: FAMILY MEDICINE

## 2021-06-21 PROCEDURE — 2022F DILAT RTA XM EVC RTNOPTHY: CPT | Performed by: FAMILY MEDICINE

## 2021-06-21 PROCEDURE — 4040F PNEUMOC VAC/ADMIN/RCVD: CPT | Performed by: FAMILY MEDICINE

## 2021-06-21 PROCEDURE — 1036F TOBACCO NON-USER: CPT | Performed by: FAMILY MEDICINE

## 2021-06-21 PROCEDURE — G8399 PT W/DXA RESULTS DOCUMENT: HCPCS | Performed by: FAMILY MEDICINE

## 2021-06-21 PROCEDURE — 99214 OFFICE O/P EST MOD 30 MIN: CPT | Performed by: FAMILY MEDICINE

## 2021-06-21 PROCEDURE — 3017F COLORECTAL CA SCREEN DOC REV: CPT | Performed by: FAMILY MEDICINE

## 2021-06-21 PROCEDURE — G8427 DOCREV CUR MEDS BY ELIG CLIN: HCPCS | Performed by: FAMILY MEDICINE

## 2021-06-21 RX ORDER — INSULIN GLARGINE 300 U/ML
50 INJECTION, SOLUTION SUBCUTANEOUS NIGHTLY
Qty: 3 PEN | Refills: 0 | COMMUNITY
Start: 2021-06-21 | End: 2022-06-29

## 2021-06-21 SDOH — ECONOMIC STABILITY: FOOD INSECURITY: WITHIN THE PAST 12 MONTHS, YOU WORRIED THAT YOUR FOOD WOULD RUN OUT BEFORE YOU GOT MONEY TO BUY MORE.: NEVER TRUE

## 2021-06-21 SDOH — ECONOMIC STABILITY: FOOD INSECURITY: WITHIN THE PAST 12 MONTHS, THE FOOD YOU BOUGHT JUST DIDN'T LAST AND YOU DIDN'T HAVE MONEY TO GET MORE.: NEVER TRUE

## 2021-06-21 ASSESSMENT — PATIENT HEALTH QUESTIONNAIRE - PHQ9
SUM OF ALL RESPONSES TO PHQ9 QUESTIONS 1 & 2: 0
SUM OF ALL RESPONSES TO PHQ QUESTIONS 1-9: 0
1. LITTLE INTEREST OR PLEASURE IN DOING THINGS: 0
SUM OF ALL RESPONSES TO PHQ QUESTIONS 1-9: 0
SUM OF ALL RESPONSES TO PHQ QUESTIONS 1-9: 0
2. FEELING DOWN, DEPRESSED OR HOPELESS: 0

## 2021-06-21 ASSESSMENT — SOCIAL DETERMINANTS OF HEALTH (SDOH): HOW HARD IS IT FOR YOU TO PAY FOR THE VERY BASICS LIKE FOOD, HOUSING, MEDICAL CARE, AND HEATING?: NOT HARD AT ALL

## 2021-06-21 NOTE — PROGRESS NOTES
[facility]  [address] [place] [address] [address] [place] [address]  Dept: [phone]      Carlita Haro is a 73 y.o. female who presents today for follow up on her  medical conditions as noted below.       Chief Complaint   Patient presents with    Diabetes       Patient Active Problem List:     Type 2 diabetes mellitus with hyperglycemia, with long-term current use of insulin ([address]     Diabetic polyneuropathy associated with type 1 diabetes mellitus ([address]     Diabetic ulcer of left foot associated with diabetes mellitus due to underlying condition, with fat layer exposed ([address]     Past Medical History:   Diagnosis Date    Diabetic polyneuropathy associated with type 1 diabetes mellitus ([address] 3/27/2019    Diabetic ulcer of left foot associated with diabetes mellitus due to underlying condition, with fat layer exposed ([address] 3/27/2019    Type 2 diabetes mellitus with hyperglycemia, with long-term current use of insulin ([address] 7/31/2018    Type II or unspecified type diabetes mellitus without mention of complication, not stated as uncontrolled       Past Surgical History:   Procedure Laterality Date    FOOT SURGERY  +10years    R foot      Family History   Problem Relation Age of Onset    Diabetes Mother     Diabetes Brother        Current Outpatient Medications   Medication Sig Dispense Refill    Insulin Glargine, 1 Unit Dial, (TOUJEO SOLOSTAR) 300 UNIT/ML SOPN Inject 50 Units into the skin nightly 3 pen 0    Insulin Glargine, 1 Unit Dial, (TOUJEO SOLOSTAR) 300 UNIT/ML SOPN INJECT 50 UNITS UNDER THE SKIN AT BEDTIME 2 pen 5    alendronate (FOSAMAX) 70 MG tablet TAKE 1 TABLET BY MOUTH EVERY 7 DAYS 12 tablet 3    furosemide (LASIX) 40 MG tablet TAKE 1 TABLET EVERY DAY 90 tablet 1    atorvastatin (LIPITOR) 10 MG tablet TAKE ONE TABLET BY MOUTH DAILY 90 tablet 3    Insulin Pen Needle (BD PEN NEEDLE DELANEY U/F) 32G X 4 MM MISC Inject 1 each into the skin daily 100 each 3    Alcohol Swabs (B-D SINGLE USE SWABS REGULAR) PADS USE THREE TIMES DAILY 300 each 2    blood glucose test strips (ACCU-CHEK MAMI PLUS) strip TEST THREE TIMES DAILY AS NEEDED 300 strip 3    Accu-Chek Softclix Lancets MISC TEST THREE TIMES DAILY 300 each 3    famotidine (PEPCID) 20 MG tablet Take 1 tablet by mouth 2 times daily 20 tablet 0    potassium chloride (KLOR-CON M) 20 MEQ extended release tablet TAKE 1 TABLET EVERY DAY 90 tablet 1    levothyroxine (SYNTHROID) 50 MCG tablet Take 1 tablet by mouth daily 30 tablet 2    FERREX 150 FORTE 150-1-25 MG-MG-MCG CAPS capsule TAKE 1 CAPSULE BY MOUTH TWICE DAILY 30 capsule 11    acetaZOLAMIDE (DIAMOX) 500 MG extended release capsule       TRAVATAN Z 0.004 % SOLN ophthalmic solution       brimonidine (ALPHAGAN) 0.2 % ophthalmic solution Place 1 drop into both eyes 2 times daily 1 Bottle 0    aspirin (ASPIRIN LOW DOSE) 81 MG EC tablet Take 1 tablet by mouth daily 30 tablet 12    dorzolamide-timolol (COSOPT) 22.3-6.8 MG/ML ophthalmic solution       metoclopramide (REGLAN) 5 MG tablet Take 1 tablet by mouth daily 90 tablet 1    fluticasone (FLONASE) 50 MCG/ACT nasal spray 1 spray by Each Nostril route daily (Patient not taking: Reported on 7/8/2020) 2 Bottle 1    ondansetron (ZOFRAN ODT) 4 MG disintegrating tablet Take 1 tablet by mouth every 8 hours as needed for Nausea or Vomiting (Patient not taking: Reported on 7/8/2020) 20 tablet 0    pilocarpine (PILOCAR) 1 % ophthalmic solution  (Patient not taking: Reported on 6/21/2021)       No current facility-administered medications for this visit.      ALLERGIES:  No Known Allergies    Social History     Tobacco Use    Smoking status: Never Smoker    Smokeless tobacco: Never Used   Substance Use Topics    Alcohol use: No        LDL Calculated (mg/dL)   Date Value   06/20/2018 121     LDL Cholesterol (mg/dL)   Date Value   08/25/2020 68     HDL (mg/dL)   Date Value   08/25/2020 57     BUN (mg/dL) Date Value   01/29/2021 21     CREATININE (mg/dL)   Date Value   01/29/2021 0.87     Glucose (mg/dL)   Date Value   01/29/2021 69 (L)   06/20/2018 157 (H)     Hemoglobin A1C (%)   Date Value   01/29/2021 6.9     Microalb/Crt. Ratio (mcg/mg creat)   Date Value   06/03/2019 CANNOT BE CALCULATED              Subjective:      HPI  she is being seen today for follow-up on diabetes she states her sugars are doing well at home  Unfortunately her out of A1c is today so she will have to go to the lab she will be due for her routine labs in August as well she states she is really not having any new complaints or problems today she is taking all of her medications    Review of Systems:     Constitutional: Negative for fever, appetite change and fatigue. Family social and medical history reviewed and unchanged     HENT: Negative. Negative for nosebleeds, trouble swallowing and neck pain. Eyes: Negative for photophobia and visual disturbance. Respiratory: Negative. Negative for chest tightness and shortness of breath. Cardiovascular: Negative. Negative for chest pain and leg swelling. Gastrointestinal: Negative. Negative for abdominal pain and blood in stool. Endocrine: Negative for cold intolerance and polyuria. Genitourinary: Negative for dysuria and hematuria. Musculoskeletal: Negative. Skin: Negative for rash. Allergic/Immunologic: Negative. Neurological: Negative. Negative for dizziness, weakness and numbness. Hematological: Negative. Negative for adenopathy. Does not bruise/bleed easily. Psychiatric/Behavioral: Negative for sleep disturbance, dysphoric mood and  decreased concentration. The patient is not nervous/anxious. Objective:     Physical Exam:     Nursing note and vitals reviewed. /73   Pulse 60   Ht 5' 9\" (1.753 m)   Wt 178 lb (80.7 kg)   SpO2 100%   BMI 26.29 kg/m²   Constitutional: She is oriented to person, place, and time.  She   appears well-developed and well-nourished. HENT:   Head: Normocephalic and atraumatic. Right Ear: External ear normal. Tympanic membrane is not erythematous. No middle ear effusion. Left Ear: External ear normal. Tympanic membrane is not erythematous. No middle ear effusion. Nose: No mucosal edema. Mouth/Throat: Oropharynx is clear and moist. No posterior oropharyngeal erythema. Eyes: Conjunctivae and EOM are normal. Pupils are equal, round, and reactive to light. Neck: Normal range of motion. Neck supple. No thyromegaly present. Cardiovascular: Normal rate, regular rhythm and normal heart sounds. No murmur heard. Pulmonary/Chest: Effort normal and breath sounds normal. She has no wheezes. Shehas no rales. Abdominal: Soft. Bowel sounds are normal. She exhibits no distension and no mass. There is no tenderness. There is no rebound and no guarding. Genitourinary/Anorectal:deferred  Musculoskeletal: Normal range of motion. She exhibits no edema or tenderness. Lymphadenopathy: She has no cervical adenopathy. Neurological: She is alert and oriented to person, place, and time. She has normal reflexes. Skin: Skin is warm and dry. No rash noted. Psychiatric: She has a normal mood and affect. Her   behavior is normal.       Assessment:      1. Type 2 diabetes mellitus with hyperglycemia, with long-term current use of insulin ([address]    2. Plantar ulcer, unspecified laterality, unspecified ulcer stage (HCC)    3. Edema of both legs    4. Elevated BUN    5. Other iron deficiency anemia    6. Iron deficiency anemia, unspecified iron deficiency anemia type    7. Pure hypercholesterolemia    8. Gastroesophageal reflux disease with esophagitis without hemorrhage    9. Acquired hypothyroidism    10. Vitamin D deficiency    11. Screening mammogram, encounter for    12. Post-menopausal    13.  Screen for colon cancer          Plan:      Call or return to clinic prn if these symptoms worsen or fail to improve as anticipated. I have reviewed the instructions with the patient, answering all questions to her satisfaction. No follow-ups on file. Orders Placed This Encounter   Procedures    Cologuard (For External Results Only)     This test is performed by an external laboratory and is used for result attachment only. It is required that this order requisition be faxed to: [facility] @ [phone]. See [email or web address] for further information.      Standing Status:   Future     Standing Expiration Date:   6/21/2022    EULALIA DIGITAL SCREEN W OR WO CAD BILATERAL     Standing Status:   Future     Standing Expiration Date:   8/21/2022     Order Specific Question:   Reason for exam:     Answer:   screen    DEXA BONE DENSITY AXIAL SKELETON     Standing Status:   Future     Standing Expiration Date:   6/21/2022     Order Specific Question:   Reason for exam:     Answer:   post sofy    CBC Auto Differential     Standing Status:   Future     Standing Expiration Date:   12/21/2021    Comprehensive Metabolic Panel     Standing Status:   Future     Standing Expiration Date:   12/21/2021    T4, Free     Standing Status:   Future     Standing Expiration Date:   12/21/2021    Lipid Panel     Standing Status:   Future     Standing Expiration Date:   12/21/2021     Order Specific Question:   Is Patient Fasting?/# of Hours     Answer:   yes    TSH without Reflex     Standing Status:   Future     Standing Expiration Date:   12/21/2021    Vitamin D 25 Hydroxy     Standing Status:   Future     Standing Expiration Date:   6/21/2022    Hemoglobin A1C     Standing Status:   Future     Standing Expiration Date:   7/21/2021    Microalbumin, Ur     Standing Status:   Future     Standing Expiration Date:   7/21/2021     Orders Placed This Encounter   Medications    Insulin Glargine, 1 Unit Dial, (TOUJEO SOLOSTAR) 300 UNIT/ML SOPN     Sig: Inject 50 Units into the skin nightly     Dispense:  3 pen     Refill:  0     Lot:

## 2021-07-02 DIAGNOSIS — E11.65 TYPE 2 DIABETES MELLITUS WITH HYPERGLYCEMIA, WITH LONG-TERM CURRENT USE OF INSULIN (HCC): ICD-10-CM

## 2021-07-02 DIAGNOSIS — Z79.4 TYPE 2 DIABETES MELLITUS WITH HYPERGLYCEMIA, WITH LONG-TERM CURRENT USE OF INSULIN (HCC): ICD-10-CM

## 2021-07-06 RX ORDER — PEN NEEDLE, DIABETIC 32GX 5/32"
NEEDLE, DISPOSABLE MISCELLANEOUS
Qty: 100 EACH | Refills: 3 | Status: SHIPPED | OUTPATIENT
Start: 2021-07-06 | End: 2022-09-07

## 2021-07-06 NOTE — TELEPHONE ENCOUNTER
Digna Quiroz is calling to request a refill on the following medication(s):    Last Visit Date (If Applicable):  1/59/7166    Next Visit Date:    Visit date not found    Medication Request:  Requested Prescriptions     Pending Prescriptions Disp Refills    DROPLET PEN NEEDLES 32G X 4 MM MISC [Pharmacy Med Name: DROPLET PEN NEEDLES 34SG2NU 32G X 4 MM  ] 100 each 3     Sig: USE TO INJECT SUBCUTANEOUSLY EVERY DAY

## 2021-07-14 RX ORDER — LANCETS
EACH MISCELLANEOUS
Qty: 300 EACH | Refills: 3 | Status: SHIPPED | OUTPATIENT
Start: 2021-07-14

## 2021-07-14 NOTE — TELEPHONE ENCOUNTER
Chloé Vinson is calling to request a refill on the following medication(s):    Last Visit Date (If Applicable):  4/12/7640    Next Visit Date:    Visit date not found    Medication Request:  Requested Prescriptions     Pending Prescriptions Disp Refills    Accu-Chek Softclix Lancets MISC 300 each 3     Sig: TEST THREE TIMES DAILY

## 2021-07-19 RX ORDER — ISOPROPYL ALCOHOL 0.75 G/1
SWAB TOPICAL
Qty: 100 EACH | Refills: 11 | Status: SHIPPED | OUTPATIENT
Start: 2021-07-19 | End: 2022-09-07

## 2021-07-19 NOTE — TELEPHONE ENCOUNTER
Hazel Leiva is calling to request a refill on the following medication(s):    Last Visit Date (If Applicable):  7/23/3009    Next Visit Date:    Visit date not found    Medication Request:  Requested Prescriptions     Pending Prescriptions Disp Refills    Alcohol Swabs (B-D SINGLE USE SWABS REGULAR) PADS [Pharmacy Med Name: BD SWABS SINGLE USE   Pad]       Sig: USE THREE TIMES DAILY

## 2021-07-21 DIAGNOSIS — Z12.11 SCREEN FOR COLON CANCER: ICD-10-CM

## 2021-09-02 ENCOUNTER — HOSPITAL ENCOUNTER (OUTPATIENT)
Age: 71
Discharge: HOME | End: 2021-09-02
Payer: MEDICARE

## 2021-09-02 ENCOUNTER — HOSPITAL ENCOUNTER (OUTPATIENT)
Dept: MAMMOGRAPHY | Age: 71
Discharge: HOME | End: 2021-09-04
Payer: MEDICARE

## 2021-09-02 DIAGNOSIS — Z12.31 SCREENING MAMMOGRAM, ENCOUNTER FOR: ICD-10-CM

## 2021-09-02 DIAGNOSIS — D50.9 IRON DEFICIENCY ANEMIA, UNSPECIFIED IRON DEFICIENCY ANEMIA TYPE: ICD-10-CM

## 2021-09-02 DIAGNOSIS — E11.65 TYPE 2 DIABETES MELLITUS WITH HYPERGLYCEMIA, WITH LONG-TERM CURRENT USE OF INSULIN (HCC): ICD-10-CM

## 2021-09-02 DIAGNOSIS — Z79.4 TYPE 2 DIABETES MELLITUS WITH HYPERGLYCEMIA, WITH LONG-TERM CURRENT USE OF INSULIN (HCC): ICD-10-CM

## 2021-09-02 DIAGNOSIS — L97.509: ICD-10-CM

## 2021-09-02 DIAGNOSIS — K21.00 GASTROESOPHAGEAL REFLUX DISEASE WITH ESOPHAGITIS WITHOUT HEMORRHAGE: ICD-10-CM

## 2021-09-02 DIAGNOSIS — R79.9 ELEVATED BUN: ICD-10-CM

## 2021-09-02 DIAGNOSIS — Z78.0 POST-MENOPAUSAL: ICD-10-CM

## 2021-09-02 DIAGNOSIS — E03.9 ACQUIRED HYPOTHYROIDISM: ICD-10-CM

## 2021-09-02 DIAGNOSIS — E78.00 PURE HYPERCHOLESTEROLEMIA: ICD-10-CM

## 2021-09-02 DIAGNOSIS — D50.8 OTHER IRON DEFICIENCY ANEMIA: ICD-10-CM

## 2021-09-02 DIAGNOSIS — R60.0 EDEMA OF BOTH LEGS: ICD-10-CM

## 2021-09-02 DIAGNOSIS — E55.9 VITAMIN D DEFICIENCY: ICD-10-CM

## 2021-09-02 LAB
ABSOLUTE EOS #: 0.07 K/UL (ref 0–0.44)
ABSOLUTE IMMATURE GRANULOCYTE: <0.03 K/UL (ref 0–0.3)
ABSOLUTE LYMPH #: 2 K/UL (ref 1.1–3.7)
ABSOLUTE MONO #: 0.33 K/UL (ref 0.1–1.2)
ALBUMIN SERPL-MCNC: 4 G/DL (ref 3.5–5.2)
ALBUMIN/GLOBULIN RATIO: 1.6 (ref 1–2.5)
ALP BLD-CCNC: 74 U/L (ref 35–104)
ALT SERPL-CCNC: 14 U/L (ref 5–33)
ANION GAP SERPL CALCULATED.3IONS-SCNC: 12 MMOL/L (ref 9–17)
AST SERPL-CCNC: 17 U/L
BASOPHILS # BLD: 1 % (ref 0–2)
BASOPHILS ABSOLUTE: <0.03 K/UL (ref 0–0.2)
BILIRUB SERPL-MCNC: 0.78 MG/DL (ref 0.3–1.2)
BUN BLDV-MCNC: 26 MG/DL (ref 8–23)
BUN/CREAT BLD: ABNORMAL (ref 9–20)
CALCIUM SERPL-MCNC: 9.4 MG/DL (ref 8.6–10.4)
CHLORIDE BLD-SCNC: 104 MMOL/L (ref 98–107)
CHOLESTEROL/HDL RATIO: 3.3
CHOLESTEROL: 186 MG/DL
CO2: 26 MMOL/L (ref 20–31)
CREAT SERPL-MCNC: 1.01 MG/DL (ref 0.5–0.9)
CREATININE URINE: 143.3 MG/DL (ref 28–217)
DIFFERENTIAL TYPE: ABNORMAL
EOSINOPHILS RELATIVE PERCENT: 2 % (ref 1–4)
ESTIMATED AVERAGE GLUCOSE: 160 MG/DL
GFR AFRICAN AMERICAN: >60 ML/MIN
GFR NON-AFRICAN AMERICAN: 54 ML/MIN
GFR SERPL CREATININE-BSD FRML MDRD: ABNORMAL ML/MIN/{1.73_M2}
GFR SERPL CREATININE-BSD FRML MDRD: ABNORMAL ML/MIN/{1.73_M2}
GLUCOSE BLD-MCNC: 66 MG/DL (ref 70–99)
HBA1C MFR BLD: 7.2 % (ref 4–6)
HCT VFR BLD CALC: 34.3 % (ref 36.3–47.1)
HDLC SERPL-MCNC: 57 MG/DL
HEMOGLOBIN: 10.8 G/DL (ref 11.9–15.1)
IMMATURE GRANULOCYTES: 0 %
LDL CHOLESTEROL: 114 MG/DL (ref 0–130)
LYMPHOCYTES # BLD: 50 % (ref 24–43)
MCH RBC QN AUTO: 28.1 PG (ref 25.2–33.5)
MCHC RBC AUTO-ENTMCNC: 31.5 G/DL (ref 28.4–34.8)
MCV RBC AUTO: 89.1 FL (ref 82.6–102.9)
MICROALBUMIN/CREAT 24H UR: 16 MG/L
MICROALBUMIN/CREAT UR-RTO: 11 MCG/MG CREAT
MONOCYTES # BLD: 9 % (ref 3–12)
NRBC AUTOMATED: 0 PER 100 WBC
PDW BLD-RTO: 12.5 % (ref 11.8–14.4)
PLATELET # BLD: 170 K/UL (ref 138–453)
PLATELET ESTIMATE: ABNORMAL
PMV BLD AUTO: 12.2 FL (ref 8.1–13.5)
POTASSIUM SERPL-SCNC: 4.1 MMOL/L (ref 3.7–5.3)
RBC # BLD: 3.85 M/UL (ref 3.95–5.11)
RBC # BLD: ABNORMAL 10*6/UL
SEG NEUTROPHILS: 38 % (ref 36–65)
SEGMENTED NEUTROPHILS ABSOLUTE COUNT: 1.48 K/UL (ref 1.5–8.1)
SODIUM BLD-SCNC: 142 MMOL/L (ref 135–144)
THYROXINE, FREE: 1.11 NG/DL (ref 0.93–1.7)
TOTAL PROTEIN: 6.5 G/DL (ref 6.4–8.3)
TRIGL SERPL-MCNC: 73 MG/DL
TSH SERPL DL<=0.05 MIU/L-ACNC: 0.64 MIU/L (ref 0.3–5)
VITAMIN D 25-HYDROXY: 36.3 NG/ML (ref 30–100)
VLDLC SERPL CALC-MCNC: NORMAL MG/DL (ref 1–30)
WBC # BLD: 3.9 K/UL (ref 3.5–11.3)
WBC # BLD: ABNORMAL 10*3/UL

## 2021-09-02 PROCEDURE — 77080 DXA BONE DENSITY AXIAL: CPT

## 2021-09-02 PROCEDURE — 85025 COMPLETE CBC W/AUTO DIFF WBC: CPT

## 2021-09-02 PROCEDURE — 80053 COMPREHEN METABOLIC PANEL: CPT

## 2021-09-02 PROCEDURE — 77063 BREAST TOMOSYNTHESIS BI: CPT

## 2021-09-02 PROCEDURE — 84443 ASSAY THYROID STIM HORMONE: CPT

## 2021-09-02 PROCEDURE — 83036 HEMOGLOBIN GLYCOSYLATED A1C: CPT

## 2021-09-02 PROCEDURE — 84439 ASSAY OF FREE THYROXINE: CPT

## 2021-09-02 PROCEDURE — 82570 ASSAY OF URINE CREATININE: CPT

## 2021-09-02 PROCEDURE — 82306 VITAMIN D 25 HYDROXY: CPT

## 2021-09-02 PROCEDURE — 36415 COLL VENOUS BLD VENIPUNCTURE: CPT

## 2021-09-02 PROCEDURE — 80061 LIPID PANEL: CPT

## 2021-09-02 PROCEDURE — 82043 UR ALBUMIN QUANTITATIVE: CPT

## 2021-09-06 NOTE — RESULT ENCOUNTER NOTE
Abnormal radiology result from patients abnormal left breast needs diagnostic mammogram and ultrasound

## 2021-09-09 DIAGNOSIS — R92.8 ABNORMAL MAMMOGRAM: Primary | ICD-10-CM

## 2021-09-13 ENCOUNTER — HOSPITAL ENCOUNTER (OUTPATIENT)
Dept: MAMMOGRAPHY | Age: 71
Discharge: HOME | End: 2021-09-15
Payer: MEDICARE

## 2021-09-13 ENCOUNTER — HOSPITAL ENCOUNTER (OUTPATIENT)
Dept: ULTRASOUND IMAGING | Age: 71
Discharge: HOME | End: 2021-09-15
Payer: MEDICARE

## 2021-09-13 DIAGNOSIS — R92.8 ABNORMAL MAMMOGRAM: ICD-10-CM

## 2021-09-13 PROCEDURE — G0279 TOMOSYNTHESIS, MAMMO: HCPCS

## 2021-09-13 PROCEDURE — 76642 ULTRASOUND BREAST LIMITED: CPT

## 2021-09-29 ENCOUNTER — TELEPHONE (OUTPATIENT)
Dept: FAMILY MEDICINE CLINIC | Age: 71
End: 2021-09-29

## 2021-09-29 NOTE — TELEPHONE ENCOUNTER
Cheryl Callahan was contacted as a part of [employer]. Patient states they have already completed an AWV within the past year.

## 2021-12-02 DIAGNOSIS — M85.80 OSTEOPENIA, UNSPECIFIED LOCATION: ICD-10-CM

## 2021-12-02 DIAGNOSIS — R60.0 EDEMA OF BOTH LEGS: ICD-10-CM

## 2021-12-03 RX ORDER — ATORVASTATIN CALCIUM 10 MG/1
TABLET, FILM COATED ORAL
Qty: 90 TABLET | Refills: 3 | Status: SHIPPED | OUTPATIENT
Start: 2021-12-03

## 2021-12-03 RX ORDER — METOCLOPRAMIDE 5 MG/1
TABLET ORAL
Qty: 90 TABLET | Refills: 1 | Status: SHIPPED | OUTPATIENT
Start: 2021-12-03 | End: 2022-11-01

## 2021-12-03 RX ORDER — FUROSEMIDE 40 MG/1
TABLET ORAL
Qty: 90 TABLET | Refills: 1 | Status: SHIPPED | OUTPATIENT
Start: 2021-12-03 | End: 2022-11-01

## 2021-12-03 RX ORDER — ALENDRONATE SODIUM 70 MG/1
TABLET ORAL
Qty: 12 TABLET | Refills: 3 | Status: SHIPPED | OUTPATIENT
Start: 2021-12-03

## 2021-12-03 NOTE — TELEPHONE ENCOUNTER
Louie Herring is calling to request a refill on the following medication(s):    Last Visit Date (If Applicable):  6/45/1411    Next Visit Date:    Visit date not found    Medication Request:  Requested Prescriptions     Pending Prescriptions Disp Refills    atorvastatin (LIPITOR) 10 MG tablet [Pharmacy Med Name: ATORVASTATIN CALCIUM 10 MG Tablet] 90 tablet 3     Sig: TAKE 1 TABLET EVERY DAY    metoclopramide (REGLAN) 5 MG tablet [Pharmacy Med Name: METOCLOPRAMIDE HCL 5 MG Tablet] 90 tablet 1     Sig: TAKE 1 TABLET EVERY DAY    furosemide (LASIX) 40 MG tablet [Pharmacy Med Name: FUROSEMIDE 40 MG Tablet] 90 tablet 1     Sig: TAKE 1 TABLET EVERY DAY    alendronate (FOSAMAX) 70 MG tablet [Pharmacy Med Name: ALENDRONATE SODIUM 70 MG Tablet] 12 tablet 3     Sig: TAKE 1 TABLET EVERY 7 DAYS

## 2022-01-26 ENCOUNTER — TELEPHONE (OUTPATIENT)
Dept: FAMILY MEDICINE CLINIC | Age: 72
End: 2022-01-26

## 2022-03-10 DIAGNOSIS — Z79.4 TYPE 2 DIABETES MELLITUS WITH HYPERGLYCEMIA, WITH LONG-TERM CURRENT USE OF INSULIN (HCC): ICD-10-CM

## 2022-03-10 DIAGNOSIS — E11.65 TYPE 2 DIABETES MELLITUS WITH HYPERGLYCEMIA, WITH LONG-TERM CURRENT USE OF INSULIN (HCC): ICD-10-CM

## 2022-03-10 NOTE — TELEPHONE ENCOUNTER
----- Message from [employer] sent at 3/10/2022  4:17 PM EST -----  Subject: Refill Request    QUESTIONS  Name of Medication? Insulin Glargine, 1 Unit Dial, (TOUJEO SOLOSTAR) 300   UNIT/ML SOPN  Patient-reported dosage and instructions? once a night   How many days do you have left? 0  Preferred Pharmacy? [address] [facility]  Pharmacy phone number (if available)? [phone]  ---------------------------------------------------------------------------  --------------  CALL BACK INFO  What is the best way for the office to contact you? OK to leave message on   voicemail  Preferred Call Back Phone Number?  [phone]

## 2022-03-10 NOTE — TELEPHONE ENCOUNTER
Pharm requested    Health Maintenance   Topic Date Due    COVID-19 Vaccine (1) Never done    Diabetic retinal exam  Never done    DTaP/Tdap/Td vaccine (1 - Tdap) Never done    Shingles Vaccine (1 of 2) Never done    Pneumococcal 65+ years Vaccine (1 of 1 - PPSV23) Never done    Diabetic foot exam  07/10/2018    Annual Wellness Visit (AWV)  Never done    Flu vaccine (1) Never done    Depression Screen  06/21/2022    A1C test (Diabetic or Prediabetic)  09/02/2022    Diabetic microalbuminuria test  09/02/2022    Lipid screen  09/02/2022    Potassium monitoring  09/02/2022    Creatinine monitoring  09/02/2022    Breast cancer screen  09/13/2023    Colorectal Cancer Screen  07/15/2024    DEXA (modify frequency per FRAX score)  Completed    Hepatitis C screen  Completed    Hepatitis A vaccine  Aged Out    Hib vaccine  Aged Out    Meningococcal (ACWY) vaccine  Aged Out             (applicable per patient's age: Cancer Screenings, Depression Screening, Fall Risk Screening, Immunizations)    Hemoglobin A1C (%)   Date Value   09/02/2021 7.2 (H)   01/29/2021 6.9   07/08/2020 8.9     Microalb/Crt. Ratio (mcg/mg creat)   Date Value   09/02/2021 11     LDL Cholesterol (mg/dL)   Date Value   09/02/2021 114     LDL Calculated (mg/dL)   Date Value   06/20/2018 121     AST (U/L)   Date Value   09/02/2021 17     ALT (U/L)   Date Value   09/02/2021 14     BUN (mg/dL)   Date Value   09/02/2021 26 (H)      (goal A1C is < 7)   (goal LDL is <100) need 30-50% reduction from baseline     BP Readings from Last 3 Encounters:   06/21/21 128/73   01/29/21 132/74   07/08/20 (!) 148/83    (goal /80)      All Future Testing planned in [facility]:  Lab Frequency Next Occurrence       Next Visit Date:  No future appointments.          Patient Active Problem List:     Type 2 diabetes mellitus with hyperglycemia, with long-term current use of insulin (HCC)     Diabetic polyneuropathy associated with type 1 diabetes mellitus [facility])     Diabetic ulcer of left foot associated with diabetes mellitus due to underlying condition, with fat layer exposed ([facility] [address]

## 2022-03-12 RX ORDER — INSULIN GLARGINE 300 U/ML
INJECTION, SOLUTION SUBCUTANEOUS
Qty: 5 PEN | Refills: 5 | Status: SHIPPED | OUTPATIENT
Start: 2022-03-12

## 2022-04-05 ENCOUNTER — OFFICE VISIT (OUTPATIENT)
Dept: FAMILY MEDICINE CLINIC | Age: 72
End: 2022-04-05
Payer: MEDICARE

## 2022-04-05 VITALS
HEART RATE: 58 BPM | HEIGHT: 69 IN | DIASTOLIC BLOOD PRESSURE: 49 MMHG | BODY MASS INDEX: 23.55 KG/M2 | WEIGHT: 159 LBS | SYSTOLIC BLOOD PRESSURE: 78 MMHG | OXYGEN SATURATION: 99 %

## 2022-04-05 DIAGNOSIS — D50.9 IRON DEFICIENCY ANEMIA, UNSPECIFIED IRON DEFICIENCY ANEMIA TYPE: ICD-10-CM

## 2022-04-05 DIAGNOSIS — L97.509: ICD-10-CM

## 2022-04-05 DIAGNOSIS — Z79.4 TYPE 2 DIABETES MELLITUS WITH HYPERGLYCEMIA, WITH LONG-TERM CURRENT USE OF INSULIN (HCC): Primary | ICD-10-CM

## 2022-04-05 DIAGNOSIS — Z12.31 SCREENING MAMMOGRAM, ENCOUNTER FOR: ICD-10-CM

## 2022-04-05 DIAGNOSIS — E11.65 TYPE 2 DIABETES MELLITUS WITH HYPERGLYCEMIA, WITH LONG-TERM CURRENT USE OF INSULIN (HCC): Primary | ICD-10-CM

## 2022-04-05 DIAGNOSIS — M62.838 MUSCLE SPASM: ICD-10-CM

## 2022-04-05 DIAGNOSIS — M85.80 OSTEOPENIA, UNSPECIFIED LOCATION: ICD-10-CM

## 2022-04-05 DIAGNOSIS — E78.00 PURE HYPERCHOLESTEROLEMIA: ICD-10-CM

## 2022-04-05 DIAGNOSIS — E55.9 VITAMIN D DEFICIENCY: ICD-10-CM

## 2022-04-05 DIAGNOSIS — E03.9 ACQUIRED HYPOTHYROIDISM: ICD-10-CM

## 2022-04-05 DIAGNOSIS — R60.0 EDEMA OF BOTH LEGS: ICD-10-CM

## 2022-04-05 DIAGNOSIS — D50.8 OTHER IRON DEFICIENCY ANEMIA: ICD-10-CM

## 2022-04-05 DIAGNOSIS — K21.00 GASTROESOPHAGEAL REFLUX DISEASE WITH ESOPHAGITIS WITHOUT HEMORRHAGE: ICD-10-CM

## 2022-04-05 LAB — HBA1C MFR BLD: 8.2 %

## 2022-04-05 PROCEDURE — 1036F TOBACCO NON-USER: CPT | Performed by: FAMILY MEDICINE

## 2022-04-05 PROCEDURE — 83036 HEMOGLOBIN GLYCOSYLATED A1C: CPT | Performed by: FAMILY MEDICINE

## 2022-04-05 PROCEDURE — G8420 CALC BMI NORM PARAMETERS: HCPCS | Performed by: FAMILY MEDICINE

## 2022-04-05 PROCEDURE — 1090F PRES/ABSN URINE INCON ASSESS: CPT | Performed by: FAMILY MEDICINE

## 2022-04-05 PROCEDURE — 3046F HEMOGLOBIN A1C LEVEL >9.0%: CPT | Performed by: FAMILY MEDICINE

## 2022-04-05 PROCEDURE — 2022F DILAT RTA XM EVC RTNOPTHY: CPT | Performed by: FAMILY MEDICINE

## 2022-04-05 PROCEDURE — G8399 PT W/DXA RESULTS DOCUMENT: HCPCS | Performed by: FAMILY MEDICINE

## 2022-04-05 PROCEDURE — 4040F PNEUMOC VAC/ADMIN/RCVD: CPT | Performed by: FAMILY MEDICINE

## 2022-04-05 PROCEDURE — 1123F ACP DISCUSS/DSCN MKR DOCD: CPT | Performed by: FAMILY MEDICINE

## 2022-04-05 PROCEDURE — G8427 DOCREV CUR MEDS BY ELIG CLIN: HCPCS | Performed by: FAMILY MEDICINE

## 2022-04-05 PROCEDURE — 3017F COLORECTAL CA SCREEN DOC REV: CPT | Performed by: FAMILY MEDICINE

## 2022-04-05 PROCEDURE — 99214 OFFICE O/P EST MOD 30 MIN: CPT | Performed by: FAMILY MEDICINE

## 2022-04-05 RX ORDER — TIZANIDINE 4 MG/1
4 TABLET ORAL NIGHTLY PRN
Qty: 90 TABLET | Refills: 3 | Status: SHIPPED | OUTPATIENT
Start: 2022-04-05 | End: 2022-07-04

## 2022-04-05 RX ORDER — LANCETS 30 GAUGE
1 EACH MISCELLANEOUS 4 TIMES DAILY
Qty: 100 EACH | Refills: 5 | Status: SHIPPED | OUTPATIENT
Start: 2022-04-05

## 2022-04-05 RX ORDER — GLUCOSAMINE HCL/CHONDROITIN SU 500-400 MG
CAPSULE ORAL
Qty: 300 STRIP | Refills: 0 | Status: SHIPPED | OUTPATIENT
Start: 2022-04-05

## 2022-04-05 RX ORDER — BLOOD-GLUCOSE METER
1 KIT MISCELLANEOUS DAILY
Qty: 1 KIT | Refills: 0 | Status: SHIPPED | OUTPATIENT
Start: 2022-04-05

## 2022-04-05 NOTE — PROGRESS NOTES
[facility]  [address] [place] [address] [address] [place] [address]  Dept: [phone]      Cosme Jewell is a 81 y.o. female who presents today for follow up on her  medical conditions as noted below. Chief Complaint   Patient presents with    Diabetes       Patient Active Problem List:     Type 2 diabetes mellitus with hyperglycemia, with long-term current use of insulin ([address]     Diabetic polyneuropathy associated with type 1 diabetes mellitus ([address]     Diabetic ulcer of left foot associated with diabetes mellitus due to underlying condition, with fat layer exposed ([address]     Past Medical History:   Diagnosis Date    Diabetic polyneuropathy associated with type 1 diabetes mellitus ([address] 3/27/2019    Diabetic ulcer of left foot associated with diabetes mellitus due to underlying condition, with fat layer exposed ([address] 3/27/2019    Type 2 diabetes mellitus with hyperglycemia, with long-term current use of insulin ([address] 7/31/2018    Type II or unspecified type diabetes mellitus without mention of complication, not stated as uncontrolled       Past Surgical History:   Procedure Laterality Date    FOOT SURGERY  +10years    R foot      Family History   Problem Relation Age of Onset    Diabetes Mother     Diabetes Brother        Current Outpatient Medications   Medication Sig Dispense Refill    glucose monitoring (FREESTYLE FREEDOM) kit 1 kit by Does not apply route daily 1 kit 0    blood glucose monitor strips Test 4 times a day & as needed for symptoms of irregular blood glucose. Dispense sufficient amount for indicated testing frequency plus additional to accommodate PRN testing needs.  300 strip 0    Lancets MISC 1 each by Does not apply route 4 times daily 100 each 5    tiZANidine (ZANAFLEX) 4 MG tablet Take 1 tablet by mouth nightly as needed (muscle spasm) 90 tablet 3    TOUJEO SOLOSTAR 300 UNIT/ML SOPN INJECT 50 UNITS UNDER THE SKIN AT BEDTIME 5 pen 5    atorvastatin (LIPITOR) 10 MG tablet TAKE 1 TABLET EVERY DAY 90 tablet 3    metoclopramide (REGLAN) 5 MG tablet TAKE 1 TABLET EVERY DAY 90 tablet 1    furosemide (LASIX) 40 MG tablet TAKE 1 TABLET EVERY DAY 90 tablet 1    alendronate (FOSAMAX) 70 MG tablet TAKE 1 TABLET EVERY 7 DAYS 12 tablet 3    Alcohol Swabs (B-D SINGLE USE SWABS REGULAR) PADS USE THREE TIMES DAILY 100 each 11    DROPLET PEN NEEDLES 32G X 4 MM MISC USE TO INJECT SUBCUTANEOUSLY EVERY  each 3    famotidine (PEPCID) 20 MG tablet Take 1 tablet by mouth 2 times daily 20 tablet 0    potassium chloride (KLOR-CON M) 20 MEQ extended release tablet TAKE 1 TABLET EVERY DAY 90 tablet 1    levothyroxine (SYNTHROID) 50 MCG tablet Take 1 tablet by mouth daily 30 tablet 2    FERREX 150 FORTE 150-1-25 MG-MG-MCG CAPS capsule TAKE 1 CAPSULE BY MOUTH TWICE DAILY 30 capsule 11    TRAVATAN Z 0.004 % SOLN ophthalmic solution       brimonidine (ALPHAGAN) 0.2 % ophthalmic solution Place 1 drop into both eyes 2 times daily 1 Bottle 0    aspirin (ASPIRIN LOW DOSE) 81 MG EC tablet Take 1 tablet by mouth daily 30 tablet 12    dorzolamide-timolol (COSOPT) 22.3-6.8 MG/ML ophthalmic solution       Accu-Chek Softclix Lancets MISC TEST THREE TIMES DAILY (Patient not taking: Reported on 4/5/2022) 300 each 3    Insulin Glargine, 1 Unit Dial, (TOUJEO SOLOSTAR) 300 UNIT/ML SOPN Inject 50 Units into the skin nightly (Patient not taking: Reported on 4/5/2022) 3 pen 0    Insulin Glargine, 1 Unit Dial, (TOUJEO SOLOSTAR) 300 UNIT/ML SOPN INJECT 50 UNITS UNDER THE SKIN AT BEDTIME (Patient not taking: Reported on 4/5/2022) 2 pen 5    blood glucose test strips (ACCU-CHEK MAMI PLUS) strip TEST THREE TIMES DAILY AS NEEDED (Patient not taking: Reported on 4/5/2022) 300 strip 3    fluticasone (FLONASE) 50 MCG/ACT nasal spray 1 spray by Each Nostril route daily (Patient not taking: Reported on 7/8/2020) 2 Bottle 1    ondansetron (ZOFRAN ODT) 4 MG appetite change and fatigue. Family social and medical history reviewed and unchanged     HENT: Negative. Negative for nosebleeds, trouble swallowing and neck pain. Eyes: Negative for photophobia and visual disturbance. Respiratory: Negative. Negative for chest tightness and shortness of breath. Cardiovascular: Negative. Negative for chest pain and leg swelling. Gastrointestinal: Negative. Negative for abdominal pain and blood in stool. Endocrine: Negative for cold intolerance and polyuria. Genitourinary: Negative for dysuria and hematuria. Musculoskeletal: Negative. Skin: Negative for rash. Allergic/Immunologic: Negative. Neurological: Negative. Negative for dizziness, weakness and numbness. Hematological: Negative. Negative for adenopathy. Does not bruise/bleed easily. Psychiatric/Behavioral: Negative for sleep disturbance, dysphoric mood and  decreased concentration. The patient is not nervous/anxious. Objective:     Physical Exam:     Nursing note and vitals reviewed. BP (!) 78/49   Pulse 58   Ht 5' 9\" (1.753 m)   Wt 159 lb (72.1 kg)   SpO2 99%   BMI 23.48 kg/m²   Constitutional: She is oriented to person, place, and time. She   appears well-developed and well-nourished. HENT:   Head: Normocephalic and atraumatic. Right Ear: External ear normal. Tympanic membrane is not erythematous. No middle ear effusion. Left Ear: External ear normal. Tympanic membrane is not erythematous. No middle ear effusion. Nose: No mucosal edema. Mouth/Throat: Oropharynx is clear and moist. No posterior oropharyngeal erythema. Eyes: Conjunctivae and EOM are normal. Pupils are equal, round, and reactive to light. Neck: Normal range of motion. Neck supple. No thyromegaly present. Cardiovascular: Normal rate, regular rhythm and normal heart sounds. No murmur heard. Pulmonary/Chest: Effort normal and breath sounds normal. She has no wheezes. Shehas no rales. Abdominal: Soft. Bowel sounds are normal. She exhibits no distension and no mass. There is no tenderness. There is no rebound and no guarding. Genitourinary/Anorectal:deferred  Musculoskeletal: Normal range of motion. She exhibits no edema or + tenderness. left trapezius area  Lymphadenopathy: She has no cervical adenopathy. Neurological: She is alert and oriented to person, place, and time. She has normal reflexes. Skin: Skin is warm and dry. No rash noted. Psychiatric: She has a normal mood and affect. Her   behavior is normal.       Assessment:      1. Type 2 diabetes mellitus with hyperglycemia, with long-term current use of insulin ([address]    2. Edema of both legs    3. Plantar ulcer, unspecified laterality, unspecified ulcer stage ([address]    4. Osteopenia, unspecified location    5. Other iron deficiency anemia    6. Pure hypercholesterolemia    7. Iron deficiency anemia, unspecified iron deficiency anemia type    8. Vitamin D deficiency    9. Gastroesophageal reflux disease with esophagitis without hemorrhage    10. Acquired hypothyroidism    11. Screening mammogram, encounter for    12. Muscle spasm          Plan:      Call or return to clinic prn if these symptoms worsen or fail to improve as anticipated. I have reviewed the instructions with the patient, answering all questions to her satisfaction. No follow-ups on file. Orders Placed This Encounter   Procedures    POCT glycosylated hemoglobin (Hb A1C)     Orders Placed This Encounter   Medications    glucose monitoring (FREESTYLE FREEDOM) kit     Si kit by Does not apply route daily     Dispense:  1 kit     Refill:  0    blood glucose monitor strips     Sig: Test 4 times a day & as needed for symptoms of irregular blood glucose. Dispense sufficient amount for indicated testing frequency plus additional to accommodate PRN testing needs. Dispense:  300 strip     Refill:  0     Brand per patient preference.  May round up to next available package size.     Lancets MISC     Si each by Does not apply route 4 times daily     Dispense:  100 each     Refill:  5    tiZANidine (ZANAFLEX) 4 MG tablet     Sig: Take 1 tablet by mouth nightly as needed (muscle spasm)     Dispense:  90 tablet     Refill:  3     Would like her to get a second ophthalmological opinion  We will try some Zanaflex to see if this helps with her muscle discomfort  And I have ordered a new glucometer  Electronically signed by Dany Brown DO on 2022 at 10:37 AM

## 2022-04-11 ENCOUNTER — TELEPHONE (OUTPATIENT)
Dept: FAMILY MEDICINE CLINIC | Age: 72
End: 2022-04-11

## 2022-04-11 NOTE — TELEPHONE ENCOUNTER
Patient called stating she thinks it is the freestyle. She used to use accucheck but was told they do not cover it anymore.  Please advise

## 2022-04-11 NOTE — TELEPHONE ENCOUNTER
Message  Received: Today  Danilo OAKLEY [facility] Davi Cowan [facility] Clinical Staff  Subject: Message to Provider     QUESTIONS   Information for Provider? human sent a fax today to get a diabetic kit for   a new meter sent three one for meter and one for the anastasia and one for   test strips for the correct meter   ---------------------------------------------------------------------------   --------------   CALL BACK INFO   What is the best way for the office to contact you? OK to leave message on   voicemail   Preferred Call Back Phone Number? [phone]   ---------------------------------------------------------------------------   --------------   SCRIPT ANSWERS   Relationship to Patient?  Self

## 2022-04-12 ENCOUNTER — TELEPHONE (OUTPATIENT)
Dept: FAMILY MEDICINE CLINIC | Age: 72
End: 2022-04-12

## 2022-04-12 NOTE — TELEPHONE ENCOUNTER
----- Message from [place] sent at 4/11/2022  2:54 PM EDT -----  Subject: Message to Provider    QUESTIONS  Information for Provider? PATIENT STATED SHE WAS SPEAKING WITH   [email or web address], HAS MORE QUESTIONS FOR HER  ---------------------------------------------------------------------------  --------------  CALL BACK INFO  What is the best way for the office to contact you? OK to leave message on   voicemail  Preferred Call Back Phone Number?  [phone]  ---------------------------------------------------------------------------  --------------  SCRIPT ANSWERS  undefined

## 2022-04-20 ENCOUNTER — TELEPHONE (OUTPATIENT)
Dept: FAMILY MEDICINE CLINIC | Age: 72
End: 2022-04-20

## 2022-04-20 DIAGNOSIS — E11.65 TYPE 2 DIABETES MELLITUS WITH HYPERGLYCEMIA, WITH LONG-TERM CURRENT USE OF INSULIN (HCC): ICD-10-CM

## 2022-04-20 DIAGNOSIS — Z79.4 TYPE 2 DIABETES MELLITUS WITH HYPERGLYCEMIA, WITH LONG-TERM CURRENT USE OF INSULIN (HCC): ICD-10-CM

## 2022-04-20 RX ORDER — GLUCOSAMINE HCL/CHONDROITIN SU 500-400 MG
CAPSULE ORAL
Qty: 300 STRIP | Refills: 0 | Status: CANCELLED | OUTPATIENT
Start: 2022-04-20

## 2022-04-20 NOTE — TELEPHONE ENCOUNTER
----- Message from Kirstin sent at 4/19/2022  4:25 PM EDT -----  Subject: Message to Provider    QUESTIONS  Information for Provider? Pt is needing testing strips for her meter to   check her sugar. It was not sent with prescription.   ---------------------------------------------------------------------------  --------------  CALL BACK INFO  What is the best way for the office to contact you? Do not leave any   message, patient will call back for answer  Preferred Call Back Phone Number? [phone]  ---------------------------------------------------------------------------  --------------  SCRIPT ANSWERS  Relationship to Patient?  Self

## 2022-04-25 ENCOUNTER — HOSPITAL ENCOUNTER (OUTPATIENT)
Age: 72
Setting detail: SPECIMEN
Discharge: HOME | End: 2022-04-25
Payer: MEDICARE

## 2022-04-25 LAB
BUN BLDV-MCNC: 28 MG/DL (ref 8–23)
CREAT SERPL-MCNC: 0.88 MG/DL (ref 0.5–0.9)
GFR AFRICAN AMERICAN: >60 ML/MIN
GFR NON-AFRICAN AMERICAN: >60 ML/MIN
GFR SERPL CREATININE-BSD FRML MDRD: NORMAL ML/MIN/{1.73_M2}
POTASSIUM SERPL-SCNC: 4 MMOL/L (ref 3.7–5.3)

## 2022-04-25 PROCEDURE — 36415 COLL VENOUS BLD VENIPUNCTURE: CPT

## 2022-04-25 PROCEDURE — 84132 ASSAY OF SERUM POTASSIUM: CPT

## 2022-04-25 PROCEDURE — 82565 ASSAY OF CREATININE: CPT

## 2022-04-25 PROCEDURE — 84520 ASSAY OF UREA NITROGEN: CPT

## 2022-05-11 ENCOUNTER — TELEPHONE (OUTPATIENT)
Dept: FAMILY MEDICINE CLINIC | Age: 72
End: 2022-05-11

## 2022-05-11 DIAGNOSIS — R60.0 EDEMA OF BOTH LEGS: Primary | ICD-10-CM

## 2022-05-11 NOTE — TELEPHONE ENCOUNTER
Patient called in stating that she would like a referral to a vascular doctor because her  legs are swelling. .        Please  advise

## 2022-05-27 ENCOUNTER — INITIAL CONSULT (OUTPATIENT)
Dept: VASCULAR SURGERY | Age: 72
End: 2022-05-27
Payer: MEDICARE

## 2022-05-27 VITALS
WEIGHT: 169 LBS | HEART RATE: 68 BPM | OXYGEN SATURATION: 98 % | HEIGHT: 69 IN | RESPIRATION RATE: 20 BRPM | SYSTOLIC BLOOD PRESSURE: 138 MMHG | DIASTOLIC BLOOD PRESSURE: 77 MMHG | BODY MASS INDEX: 25.03 KG/M2 | TEMPERATURE: 98.6 F

## 2022-05-27 DIAGNOSIS — I89.0 LYMPHEDEMA: Primary | ICD-10-CM

## 2022-05-27 PROCEDURE — 99203 OFFICE O/P NEW LOW 30 MIN: CPT | Performed by: SURGERY

## 2022-05-27 PROCEDURE — 1123F ACP DISCUSS/DSCN MKR DOCD: CPT | Performed by: SURGERY

## 2022-05-27 ASSESSMENT — ENCOUNTER SYMPTOMS
EYE PAIN: 0
VOICE CHANGE: 0
ABDOMINAL DISTENTION: 0
COUGH: 0
TROUBLE SWALLOWING: 0
COLOR CHANGE: 0
VOMITING: 0
ABDOMINAL PAIN: 0
CHEST TIGHTNESS: 0
SHORTNESS OF BREATH: 0

## 2022-05-27 NOTE — PROGRESS NOTES
[facility]  [address] [facility] [address] [place] [address]  Dept: [phone]     Patient: Samantha Kitchen  : 1950  MRN: [id number]  DOS: 2022    Referring provider:  Denver Slater, DO         HPI:  Samantha Kitchen is a 81 y.o. female who comes to the office for the first time regarding bilateral lower extremity lymphedema. She has had edema for quite some time. She does not use compression stockings. She does not have known CHF. She has never had radiation therapy for cancers. She has not had large abdominal procedures or retroperitoneal dissections. She has not had lower extremity procedures. She has diabetes with vision problems and neuropathy. She has trouble finding her feet in space and trouble feeling her feet. She has a diabetic foot ulcer on the right on the plantar aspect of the foot which is being treated by podiatry.   Past Medical History:   Diagnosis Date    Diabetic polyneuropathy associated with type 1 diabetes mellitus ([address] 3/27/2019    Diabetic ulcer of left foot associated with diabetes mellitus due to underlying condition, with fat layer exposed ([address] 3/27/2019    Type 2 diabetes mellitus with hyperglycemia, with long-term current use of insulin ([address] 2018    Type II or unspecified type diabetes mellitus without mention of complication, not stated as uncontrolled      Family History   Problem Relation Age of Onset    Diabetes Mother     Diabetes Brother       Social History     Socioeconomic History    Marital status: Single     Spouse name: Not on file    Number of children: Not on file    Years of education: Not on file    Highest education level: Not on file   Occupational History    Not on file   Tobacco Use    Smoking status: Never Smoker    Smokeless tobacco: Never Used   Vaping Use    Vaping Use: Never used   Substance and Sexual Activity    Alcohol use: No    Drug use: No    Sexual activity: Not on file   Other Topics Concern    Not on file   Social History Narrative    Not on file     Social Determinants of Health     Financial Resource Strain: Low Risk     Difficulty of Paying Living Expenses: Not hard at all   Food Insecurity: No Food Insecurity    Worried About Running Out of Food in the Last Year: Never true    [address] in the Last Year: Never true   Transportation Needs:     Lack of Transportation (Medical): Not on file    Lack of Transportation (Non-Medical): Not on file   Physical Activity:     Days of Exercise per Week: Not on file    Minutes of Exercise per Session: Not on file   Stress:     Feeling of Stress : Not on file   Social Connections:     Frequency of Communication with Friends and Family: Not on file    Frequency of Social Gatherings with Friends and Family: Not on file    Attends Taoist Services: Not on file    Active Member of [address] [place] [id number] or Organizations: Not on file    Attends Club or Organization Meetings: Not on file    Marital Status: Not on file   Intimate Partner Violence:     Fear of Current or Ex-Partner: Not on file    Emotionally Abused: Not on file    Physically Abused: Not on file    Sexually Abused: Not on file   Housing Stability:     Unable to Pay for Housing in the Last Year: Not on file    Number of [place] in the Last Year: Not on file    Unstable Housing in the Last Year: Not on file      Past Surgical History:   Procedure Laterality Date    FOOT SURGERY  +10years    R foot       Review of Systems   Constitutional: Negative for activity change, fever and unexpected weight change. HENT: Negative for trouble swallowing and voice change. Eyes: Negative for pain and visual disturbance. Respiratory: Negative for cough, chest tightness and shortness of breath. Cardiovascular: Negative for chest pain and palpitations.    Gastrointestinal: Negative for abdominal distention, abdominal pain and vomiting. Endocrine: Negative for cold intolerance and heat intolerance. Genitourinary: Negative for dysuria, flank pain and hematuria. Musculoskeletal: Negative for joint swelling and neck pain. Skin: Negative for color change and rash. Allergic/Immunologic: Negative for immunocompromised state. Neurological: Negative for syncope, speech difficulty, weakness, numbness and headaches. Hematological: Negative for adenopathy. Psychiatric/Behavioral: Negative for behavioral problems and suicidal ideas. Vitals:    05/27/22 1300   BP: 138/77   Site: Right Upper Arm   Position: Sitting   Cuff Size: Medium Adult   Pulse: 68   Resp: 20   Temp: 98.6 °F (37 °C)   TempSrc: Temporal   SpO2: 98%   Weight: 169 lb (76.7 kg)   Height: 5' 9\" (1.753 m)          Physical Exam  Constitutional:       General: She is not in acute distress. HENT:      Mouth/Throat:      Mouth: Mucous membranes are moist.      Pharynx: Oropharynx is clear. Eyes:      General: No scleral icterus. Extraocular Movements: Extraocular movements intact. Conjunctiva/sclera: Conjunctivae normal.   Neck:      Thyroid: No thyroid mass or thyromegaly. Cardiovascular:      Rate and Rhythm: Normal rate and regular rhythm. Heart sounds: No murmur heard. Comments: She has no carotid bruits. She has no cardiac murmurs. Her chest is clear to auscultation bilaterally. She has no crackles or wheezes and no rhonchi. Her abdomen is soft nontender nondistended. She has palpable femoral and popliteal pulses bilaterally. There is a dorsalis pedis pulse on the left but not the right. I cannot palpate posterior tibial pulses on either side due to edema. She has edema in the lymphatic pattern extending from just below the level of the knee down to and including the feet and toes. There are no varicosities. She has no significant venous stasis changes. She does not wear compression stockings.   Pulmonary: Effort: No respiratory distress. Breath sounds: No rales. Abdominal:      General: There is no distension. Palpations: There is no mass. Tenderness: There is no abdominal tenderness. There is no guarding. Musculoskeletal:      Cervical back: No rigidity or tenderness. Lymphadenopathy:      Cervical: No cervical adenopathy. Skin:     Coloration: Skin is not jaundiced. Findings: No rash. Neurological:      General: No focal deficit present. Mental Status: She is alert and oriented to person, place, and time. Cranial Nerves: No cranial nerve deficit. Psychiatric:         Mood and Affect: Mood normal.         Assessment:  1. Lymphedema          Plan: At this time I think the best option for her is to go ahead with compression therapy first at the lymphedema clinic and then with compression stockings and eventually graduate to flex to touch lymphatic compression devices. She understands that this is a long-term disease with the need for chronic treatment in the form of compression in 1 form or another. She also understands that this will not be cured. She agrees to proceed in this fashion and we can see her back in 6 months to make certain that things are going well for her. She also has a diabetic foot ulcer which I do not think is vascular related and can be taken care of by podiatry as well. This has been going on for 2 full years and has not worsened. I did inspect the ulcer and it is small and on the plantar aspect of the foot. The toes are not gangrenous and she has no other signs of arterial ischemia.     Electronically signed by:  Alonso Manuel MD

## 2022-05-31 DIAGNOSIS — R60.0 BILATERAL LOWER EXTREMITY EDEMA: Primary | ICD-10-CM

## 2022-06-29 ENCOUNTER — HOSPITAL ENCOUNTER (OUTPATIENT)
Dept: WOUND CARE | Age: 72
Discharge: HOME | End: 2022-06-29
Payer: MEDICARE

## 2022-06-29 VITALS
HEIGHT: 69 IN | WEIGHT: 169 LBS | HEART RATE: 56 BPM | DIASTOLIC BLOOD PRESSURE: 61 MMHG | BODY MASS INDEX: 25.03 KG/M2 | SYSTOLIC BLOOD PRESSURE: 110 MMHG | TEMPERATURE: 96.5 F | RESPIRATION RATE: 19 BRPM

## 2022-06-29 DIAGNOSIS — E11.51 TYPE II DIABETES MELLITUS WITH PERIPHERAL CIRCULATORY DISORDER (HCC): Primary | ICD-10-CM

## 2022-06-29 DIAGNOSIS — M14.671 CHARCOT'S JOINT OF FOOT, RIGHT: ICD-10-CM

## 2022-06-29 DIAGNOSIS — L97.512 RIGHT FOOT ULCER, WITH FAT LAYER EXPOSED (HCC): ICD-10-CM

## 2022-06-29 PROBLEM — E11.42 DIABETIC POLYNEUROPATHY ASSOCIATED WITH TYPE 2 DIABETES MELLITUS (HCC): Status: ACTIVE | Noted: 2019-03-27

## 2022-06-29 PROCEDURE — 11042 DBRDMT SUBQ TIS 1ST 20SQCM/<: CPT

## 2022-06-29 PROCEDURE — 99213 OFFICE O/P EST LOW 20 MIN: CPT

## 2022-06-29 RX ORDER — GINSENG 100 MG
CAPSULE ORAL ONCE
Status: CANCELLED | OUTPATIENT
Start: 2022-06-29 | End: 2022-06-29

## 2022-06-29 RX ORDER — GENTAMICIN SULFATE 1 MG/G
OINTMENT TOPICAL ONCE
Status: CANCELLED | OUTPATIENT
Start: 2022-06-29 | End: 2022-06-29

## 2022-06-29 RX ORDER — BACITRACIN ZINC AND POLYMYXIN B SULFATE 500; 1000 [USP'U]/G; [USP'U]/G
OINTMENT TOPICAL ONCE
Status: CANCELLED | OUTPATIENT
Start: 2022-06-29 | End: 2022-06-29

## 2022-06-29 RX ORDER — CLOBETASOL PROPIONATE 0.5 MG/G
OINTMENT TOPICAL ONCE
Status: CANCELLED | OUTPATIENT
Start: 2022-06-29 | End: 2022-06-29

## 2022-06-29 RX ORDER — BETAMETHASONE DIPROPIONATE 0.05 %
OINTMENT (GRAM) TOPICAL ONCE
Status: CANCELLED | OUTPATIENT
Start: 2022-06-29 | End: 2022-06-29

## 2022-06-29 RX ORDER — BACITRACIN, NEOMYCIN, POLYMYXIN B 400; 3.5; 5 [USP'U]/G; MG/G; [USP'U]/G
OINTMENT TOPICAL ONCE
Status: CANCELLED | OUTPATIENT
Start: 2022-06-29 | End: 2022-06-29

## 2022-06-29 RX ORDER — LIDOCAINE 50 MG/G
OINTMENT TOPICAL ONCE
Status: CANCELLED | OUTPATIENT
Start: 2022-06-29 | End: 2022-06-29

## 2022-06-29 RX ORDER — LIDOCAINE 40 MG/G
CREAM TOPICAL ONCE
Status: CANCELLED | OUTPATIENT
Start: 2022-06-29 | End: 2022-06-29

## 2022-06-29 RX ORDER — LIDOCAINE HYDROCHLORIDE 40 MG/ML
SOLUTION TOPICAL ONCE
Status: CANCELLED | OUTPATIENT
Start: 2022-06-29 | End: 2022-06-29

## 2022-06-29 RX ORDER — LIDOCAINE HYDROCHLORIDE 20 MG/ML
JELLY TOPICAL ONCE
Status: COMPLETED | OUTPATIENT
Start: 2022-06-29 | End: 2022-06-29

## 2022-06-29 RX ORDER — LIDOCAINE HYDROCHLORIDE 20 MG/ML
JELLY TOPICAL ONCE
Status: CANCELLED | OUTPATIENT
Start: 2022-06-29 | End: 2022-06-29

## 2022-06-29 RX ADMIN — LIDOCAINE HYDROCHLORIDE 6 ML: 20 JELLY TOPICAL at 10:41

## 2022-06-29 ASSESSMENT — ENCOUNTER SYMPTOMS
NAUSEA: 0
DIARRHEA: 0
VOMITING: 0

## 2022-06-29 NOTE — PROGRESS NOTES
[address]   Progress Note and Procedure Note      Sarah Reed  MEDICAL RECORD NUMBER:  [id number]  AGE: 81 y.o. GENDER: female  : 1950  EPISODE DATE:  2022    Subjective:     Chief Complaint   Patient presents with    Wound Check     right foot         HISTORY of PRESENT ILLNESS HPI     Sarah Reed is a 81 y.o. female who presents today for wound/ulcer evaluation. History of Wound Context: Xavi Vazquez presents for evaluation of plantar right foot ulceration. She states that it has been open for about 2 months now. She was seeing a podiatrist for this and was referred to the wound care clinic. She presents in a Crow on the right and a custom diabetic shoe on the left. She does have a history of Charcot. Has not had any sign or symptom of infection.     Ulcer Identification:  Ulcer Type: diabetic and neuropathic  Contributing Factors: edema, lymphedema, diabetes, chronic pressure and shear force          PAST MEDICAL HISTORY        Diagnosis Date    Diabetic polyneuropathy associated with type 1 diabetes mellitus ([address] 3/27/2019    Diabetic ulcer of left foot associated with diabetes mellitus due to underlying condition, with fat layer exposed ([address] 3/27/2019    Type 2 diabetes mellitus with hyperglycemia, with long-term current use of insulin ([address] 2018    Type II or unspecified type diabetes mellitus without mention of complication, not stated as uncontrolled        PAST SURGICAL HISTORY    Past Surgical History:   Procedure Laterality Date    FOOT SURGERY  +10years    R foot        FAMILY HISTORY    Family History   Problem Relation Age of Onset    Diabetes Mother     Diabetes Brother        SOCIAL HISTORY    Social History     Tobacco Use    Smoking status: Never Smoker    Smokeless tobacco: Never Used   Vaping Use    Vaping Use: Never used   Substance Use Topics    Alcohol use: No    Drug use: No       ALLERGIES    No Known Allergies    MEDICATIONS    Current Outpatient Medications on File Prior to Encounter   Medication Sig Dispense Refill    glucose monitoring (FREESTYLE FREEDOM) kit 1 kit by Does not apply route daily 1 kit 0    blood glucose monitor strips Test 4 times a day & as needed for symptoms of irregular blood glucose. Dispense sufficient amount for indicated testing frequency plus additional to accommodate PRN testing needs.  300 strip 0    Lancets MISC 1 each by Does not apply route 4 times daily 100 each 5    tiZANidine (ZANAFLEX) 4 MG tablet Take 1 tablet by mouth nightly as needed (muscle spasm) 90 tablet 3    TOUJEO SOLOSTAR 300 UNIT/ML SOPN INJECT 50 UNITS UNDER THE SKIN AT BEDTIME 5 pen 5    atorvastatin (LIPITOR) 10 MG tablet TAKE 1 TABLET EVERY DAY 90 tablet 3    metoclopramide (REGLAN) 5 MG tablet TAKE 1 TABLET EVERY DAY (Patient not taking: Reported on 5/27/2022) 90 tablet 1    furosemide (LASIX) 40 MG tablet TAKE 1 TABLET EVERY DAY 90 tablet 1    alendronate (FOSAMAX) 70 MG tablet TAKE 1 TABLET EVERY 7 DAYS 12 tablet 3    Alcohol Swabs (B-D SINGLE USE SWABS REGULAR) PADS USE THREE TIMES DAILY 100 each 11    Accu-Chek Softclix Lancets MISC TEST THREE TIMES DAILY (Patient not taking: Reported on 4/5/2022) 300 each 3    DROPLET PEN NEEDLES 32G X 4 MM MISC USE TO INJECT SUBCUTANEOUSLY EVERY  each 3    blood glucose test strips (ACCU-CHEK MAMI PLUS) strip TEST THREE TIMES DAILY AS NEEDED (Patient not taking: Reported on 4/5/2022) 300 strip 3    potassium chloride (KLOR-CON M) 20 MEQ extended release tablet TAKE 1 TABLET EVERY DAY 90 tablet 1    levothyroxine (SYNTHROID) 50 MCG tablet Take 1 tablet by mouth daily 30 tablet 2    FERREX 150 FORTE 150-1-25 MG-MG-MCG CAPS capsule TAKE 1 CAPSULE BY MOUTH TWICE DAILY 30 capsule 11    acetaZOLAMIDE (DIAMOX) 500 MG extended release capsule       TRAVATAN Z 0.004 % SOLN ophthalmic solution       brimonidine (ALPHAGAN) 0.2 % ophthalmic solution Place 1 drop into both eyes 2 times daily 1 Bottle 0    aspirin (ASPIRIN LOW DOSE) 81 MG EC tablet Take 1 tablet by mouth daily 30 tablet 12    dorzolamide-timolol (COSOPT) 22.3-6.8 MG/ML ophthalmic solution        No current facility-administered medications on file prior to encounter. REVIEW OF SYSTEMS    Review of Systems   Constitutional: Negative for chills and fever. Gastrointestinal: Negative for diarrhea, nausea and vomiting. Skin: Positive for wound. Objective:      /61   Pulse 56   Temp (!) 96.5 °F (35.8 °C) (Tympanic)   Resp 19   Ht 5' 9\" (1.753 m)   Wt 169 lb (76.7 kg)   BMI 24.96 kg/m²     Wt Readings from Last 3 Encounters:   06/29/22 169 lb (76.7 kg)   05/27/22 169 lb (76.7 kg)   04/05/22 159 lb (72.1 kg)       Physical Exam:  General:  Alert and oriented x3. In no acute distress. Lower Extremity Physical Exam:    Vascular: DP pulse on the left is palpable and not palpable on the right. PT pulse on the right is palpable and not palpable on the left. CFT <3 seconds to all digits, Bilateral.  Pitting edema, Bilateral.  Hair growth is absent to the level of the lower leg, Bilateral.     Neuro: Saph/sural/SP/DP/plantar sensation absent to light touch. Protective sensation is intact to 0/10 sites as tested with a 5.07g SWMF, Bilateral.     Musculoskeletal: EHL/FHL/GS/TA gross motor intact. Gross deformity is present, rocker bottom deformity right foot with prominence at the site of ulceration. Dermatologic: Open wound present to plantar right midfoot as documented in detail below. Wound base is fibrotic with slough. Leda-wound skin is hyperkeratotic with undermining. Negative probe to bone. There is no erythema. There is no purulent drainage. There is no fluctuance or crepitus.  Interdigital maceration absent, Bilateral.       Assessment:      Active Hospital Problems    Diagnosis Date Noted    Right foot ulcer, with fat layer exposed ([facility] [L97.512] 06/29/2022 Priority: Medium    Charcot's joint of foot, right [M14.671] 06/29/2022     Priority: Medium    Diabetic polyneuropathy associated with type 2 diabetes mellitus ([facility] [E11.42] 03/27/2019    Type II diabetes mellitus with peripheral circulatory disorder ([facility] [E11.51] 07/31/2018       Plan:     Treatment Note please see attached Discharge Instructions    PVR/PPG ordered  XR 3 views right foot ordered to rule out OM    Discussed the importance of offloading for healing of a plantar neuropathic ulceration. Plan for total contact cast once osteomyelitis is excluded and noninvasive vascular testing is completed. Michelle Fetch for now. Recommended that she limit ambulation as much as possible in the meantime. Coty to the wound base daily    Educated on signs and symptoms of infection. Instructed to call clinic immediately or go to ER if signs and symptoms of infection are present. RTC 1 week       Procedure Note  Indications:  Based on my examination of this patient's wound(s)/ulcer(s) today, debridement is required to promote healing and evaluate the wound base. Performed by: Brian Garcia DPM    Consent obtained:  Yes    Time out taken:  Yes    Pain Control: Anesthetic  Anesthetic: 2% Lidocaine Gel Topical       Debridement: Excisional Debridement    Using curette and tissue nippers the wound(s)/ulcer(s) was/were sharply debrided down through and including the removal of subcutaneous tissue. Devitalized Tissue Debrided:  fibrin, biofilm, slough and callus    Pre Debridement Measurements:  Are located in the [place]  Documentation Flow Sheet    Wound/Ulcer #: 1    Post Debridement Measurements:  Wound/Ulcer Descriptions are Pre Debridement except measurements:    Wound 06/29/22 Foot Right;Plantar (Active)   Wound Image   06/29/22 0826   Wound Etiology Diabetic 06/29/22 0826   Dressing Status New drainage noted; Old drainage noted 06/29/22 0826   Wound Cleansed Cleansed with saline 06/29/22 7302   Offloading for Diabetic Foot Ulcers Offloading ordered; Offloading boot 06/29/22 0826   Wound Length (cm) 1.9 cm 06/29/22 0826   Wound Width (cm) 1.6 cm 06/29/22 0826   Wound Depth (cm) 0.6 cm 06/29/22 0826   Wound Surface Area (cm^2) 3.04 cm^2 06/29/22 0826   Wound Volume (cm^3) 1.824 cm^3 06/29/22 0826   Post-Procedure Length (cm) 2.4 cm 06/29/22 0826   Post-Procedure Width (cm) 1.9 cm 06/29/22 0826   Post-Procedure Depth (cm) 0.3 cm 06/29/22 0826   Post-Procedure Surface Area (cm^2) 4.56 cm^2 06/29/22 0826   Post-Procedure Volume (cm^3) 1.368 cm^3 06/29/22 0826   Wound Assessment Pink/red;Slough 06/29/22 0826   Drainage Amount Moderate 06/29/22 0826   Drainage Description Serosanguinous 06/29/22 0826   Odor None 06/29/22 0826   Leda-wound Assessment Maceration; Hyperkeratosis (callous) 06/29/22 0826   Margins Defined edges 06/29/22 0826   Wound Thickness Description not for Pressure Injury Full thickness 06/29/22 0826   Number of days: 0          Percent of Wound(s)/Ulcer(s) Debrided: 100%    Total Surface Area Debrided:  4.56 sq cm     Diabetic/Pressure/Non Pressure Ulcers only:  Ulcer: Diabetic ulcer, fat layer exposed     Estimated Blood Loss:  Estimated amount of blood loss is 2ml. Hemostasis Achieved:  by pressure    Response to treatment:  Well tolerated by patient. Written patient discharge instructions given to patient and signed by patient or POA. No orders of the defined types were placed in this encounter.     Orders Placed This Encounter   Procedures    VL LOWER EXTREMITY ARTERIAL SEGMENTAL PRESSURES W PPG     Standing Status:   Future     Standing Expiration Date:   6/29/2023     Order Specific Question:   Reason for exam:     Answer:   DM, PAD with ulceration    XR FOOT RIGHT (MIN 3 VIEWS)     Standing Status:   Future     Standing Expiration Date:   6/29/2023     Order Specific Question:   Reason for exam:     Answer:   plantar lateral midfoot ulceration to subcutaneous Discharge Instructions          [address] [facility] -Phone: [phone] Fax: [phone]   Visit  Discharge Instructions / Physician Orders    DATE: 6/29/2022     Home Care:      SUPPLIES ORDERED THRU:      Wound Location: Right Plantar Foot     Cleanse with: Liquid antibacterial soap and water, rinse well      Dressing Orders: Coty to wound, dry dressing     Frequency: Daily     Additional Orders: Increase protein to diet (meat, cheese, eggs, fish, peanut butter, nuts and beans)  Multivitamin daily  ELEVATE LEGS AS MUCH AS POSSIBLE  6/29/22- X-Ray Ordered   6/29/22- Vascular Studies Ordered    Your next appointment with [address] [place] [address] is in 1 week with Dr. Mason Zamudio     (Please note your next appointment above and if you are unable to keep, kindly give a 24 hour notice. Thank you.)  If more than 15 min late we cannot guarantee you will be seen due to clinician schedule  Per Policy, Excessive cancellation will call for dismissal from program.     If you experience any of the following, please call the [address] [place] [address] during business hours:  [phone]  Your Phone call may be forwarded to [facility] during business hours that [facility] is closed. * Increase in Pain  * Temperature over 101  * Increase in drainage from your wound  * Drainage with a foul odor  * Bleeding  * Increase in swelling  * Need for compression bandage changes due to slippage, breakthrough drainage. If you need medical attention outside of the business hours of the [address] [place] [address] please contact your PCP or go to the nearest emergency room. The information contained in the After Visit Summary has been reviewed with me, the patient and/or responsible adult, by my health care provider(s). I had the opportunity to ask questions regarding this information.  I have elected to receive;      []After Visit Summary  [x]Comprehensive Discharge Instruction      Patient signature______________________________________Date:________  Electronically signed by Concepcion Walker RN on 6/29/2022 at 8:58 AM  Electronically signed by Jeanne Wilcox DPM on 6/29/2022 at 8:30 AM          Electronically signed by Jeanne Wilcox DPM on 6/29/2022 at 9:08 AM

## 2022-06-30 ENCOUNTER — HOSPITAL ENCOUNTER (OUTPATIENT)
Age: 72
Discharge: HOME | End: 2022-07-02
Payer: MEDICARE

## 2022-06-30 ENCOUNTER — HOSPITAL ENCOUNTER (OUTPATIENT)
Dept: GENERAL RADIOLOGY | Age: 72
Discharge: HOME | End: 2022-07-02
Payer: MEDICARE

## 2022-06-30 DIAGNOSIS — L97.512 RIGHT FOOT ULCER, WITH FAT LAYER EXPOSED (HCC): ICD-10-CM

## 2022-06-30 DIAGNOSIS — E11.51 TYPE II DIABETES MELLITUS WITH PERIPHERAL CIRCULATORY DISORDER (HCC): ICD-10-CM

## 2022-06-30 DIAGNOSIS — M14.671 CHARCOT'S JOINT OF FOOT, RIGHT: ICD-10-CM

## 2022-06-30 PROCEDURE — 73630 X-RAY EXAM OF FOOT: CPT

## 2022-07-06 ENCOUNTER — HOSPITAL ENCOUNTER (OUTPATIENT)
Dept: WOUND CARE | Age: 72
Discharge: HOME | End: 2022-07-06
Payer: MEDICARE

## 2022-07-06 VITALS
HEART RATE: 60 BPM | BODY MASS INDEX: 25.03 KG/M2 | SYSTOLIC BLOOD PRESSURE: 112 MMHG | TEMPERATURE: 97 F | HEIGHT: 69 IN | WEIGHT: 169 LBS | DIASTOLIC BLOOD PRESSURE: 66 MMHG

## 2022-07-06 DIAGNOSIS — L97.512 RIGHT FOOT ULCER, WITH FAT LAYER EXPOSED (HCC): Primary | ICD-10-CM

## 2022-07-06 PROCEDURE — 11042 DBRDMT SUBQ TIS 1ST 20SQCM/<: CPT

## 2022-07-06 RX ORDER — LIDOCAINE HYDROCHLORIDE 20 MG/ML
JELLY TOPICAL ONCE
Status: CANCELLED | OUTPATIENT
Start: 2022-07-06 | End: 2022-07-06

## 2022-07-06 RX ORDER — GENTAMICIN SULFATE 1 MG/G
OINTMENT TOPICAL ONCE
Status: CANCELLED | OUTPATIENT
Start: 2022-07-06 | End: 2022-07-06

## 2022-07-06 RX ORDER — LIDOCAINE 50 MG/G
OINTMENT TOPICAL ONCE
Status: CANCELLED | OUTPATIENT
Start: 2022-07-06 | End: 2022-07-06

## 2022-07-06 RX ORDER — LIDOCAINE HYDROCHLORIDE 40 MG/ML
SOLUTION TOPICAL ONCE
Status: CANCELLED | OUTPATIENT
Start: 2022-07-06 | End: 2022-07-06

## 2022-07-06 RX ORDER — CLOBETASOL PROPIONATE 0.5 MG/G
OINTMENT TOPICAL ONCE
Status: CANCELLED | OUTPATIENT
Start: 2022-07-06 | End: 2022-07-06

## 2022-07-06 RX ORDER — LIDOCAINE HYDROCHLORIDE 20 MG/ML
JELLY TOPICAL ONCE
Status: COMPLETED | OUTPATIENT
Start: 2022-07-06 | End: 2022-07-06

## 2022-07-06 RX ORDER — LIDOCAINE 40 MG/G
CREAM TOPICAL ONCE
Status: CANCELLED | OUTPATIENT
Start: 2022-07-06 | End: 2022-07-06

## 2022-07-06 RX ORDER — BETAMETHASONE DIPROPIONATE 0.05 %
OINTMENT (GRAM) TOPICAL ONCE
Status: CANCELLED | OUTPATIENT
Start: 2022-07-06 | End: 2022-07-06

## 2022-07-06 RX ORDER — GINSENG 100 MG
CAPSULE ORAL ONCE
Status: CANCELLED | OUTPATIENT
Start: 2022-07-06 | End: 2022-07-06

## 2022-07-06 RX ORDER — BACITRACIN, NEOMYCIN, POLYMYXIN B 400; 3.5; 5 [USP'U]/G; MG/G; [USP'U]/G
OINTMENT TOPICAL ONCE
Status: CANCELLED | OUTPATIENT
Start: 2022-07-06 | End: 2022-07-06

## 2022-07-06 RX ORDER — BACITRACIN ZINC AND POLYMYXIN B SULFATE 500; 1000 [USP'U]/G; [USP'U]/G
OINTMENT TOPICAL ONCE
Status: CANCELLED | OUTPATIENT
Start: 2022-07-06 | End: 2022-07-06

## 2022-07-06 RX ADMIN — LIDOCAINE HYDROCHLORIDE 6 ML: 20 JELLY TOPICAL at 08:29

## 2022-07-06 ASSESSMENT — ENCOUNTER SYMPTOMS
VOMITING: 0
DIARRHEA: 0
NAUSEA: 0

## 2022-07-06 NOTE — PROGRESS NOTES
07/06/22 0827   Drainage Amount Moderate 07/06/22 0827   Drainage Description Yellow;Serosanguinous 07/06/22 0827   Odor None 07/06/22 0827   Leda-wound Assessment Maceration; Hyperkeratosis (callous) 07/06/22 0827   Margins Defined edges 07/06/22 0827   Wound Thickness Description not for Pressure Injury Full thickness 07/06/22 0827   Number of days: 7          Supplies Requested :      WOUND #: 1   PRIMARY DRESSING:  Collagen with silver-Coty   Cover and Secure with:  Other Excel SAP 4X4     FREQUENCY OF DRESSING CHANGES:  Daily     ADDITIONAL ITEMS:  [] Gloves Small  [x] Gloves Medium [] Gloves Large [] Gloves Chestine Atul  [] Tape 1\" [] Tape 2\" [] Tape 3\"  [] Medipore Tape  [x] Saline  [] Skin Prep   [] Adhesive Remover   [] Cotton Tip Applicators   [] Other:    Patient Wound(s) Debrided: [x] Yes   [] No    Debribement Type: subcutaneous tissue    Debridement Date: 7/6/2022    Patient currently being seen by Home Health: [] Yes   [x] No    Duration for needed supplies:  []15  [x]30  []60  []90 Days    Provider Information:      Providers Name: Dr. Pete Barber DPM   NPI: [id number]

## 2022-07-06 NOTE — PROGRESS NOTES
[address]   Progress Note and Procedure Note      Garfield Kong  MEDICAL RECORD NUMBER:  [id number]  AGE: 81 y.o. GENDER: female  : 1950  EPISODE DATE:  2022    Subjective:     Chief Complaint   Patient presents with    Wound Check     rle         HISTORY of PRESENT ILLNESS HPI     Garfield Kong is a 81 y.o. female who presents today for wound/ulcer evaluation. Interval history:  XR was negative for sign of osteomyelitis. Has not yet scheduled the PVR/PPG. History of Wound Context: Trent Baez presents for evaluation of plantar right foot ulceration. She states that it has been open for about 2 months now. She was seeing a podiatrist for this and was referred to the wound care clinic. She presents in a Crow on the right and a custom diabetic shoe on the left. She does have a history of Charcot. Has not had any sign or symptom of infection.     Ulcer Identification:  Ulcer Type: diabetic and neuropathic  Contributing Factors: edema, lymphedema, diabetes, chronic pressure and shear force          PAST MEDICAL HISTORY        Diagnosis Date    Diabetic polyneuropathy associated with type 1 diabetes mellitus ([address] 3/27/2019    Diabetic ulcer of left foot associated with diabetes mellitus due to underlying condition, with fat layer exposed ([address] 3/27/2019    Type 2 diabetes mellitus with hyperglycemia, with long-term current use of insulin ([address] 2018    Type II or unspecified type diabetes mellitus without mention of complication, not stated as uncontrolled        PAST SURGICAL HISTORY    Past Surgical History:   Procedure Laterality Date    FOOT SURGERY  +10years    R foot        FAMILY HISTORY    Family History   Problem Relation Age of Onset    Diabetes Mother     Diabetes Brother        SOCIAL HISTORY    Social History     Tobacco Use    Smoking status: Never Smoker    Smokeless tobacco: Never Used   Vaping Use    Vaping Use: Never used Substance Use Topics    Alcohol use: No    Drug use: No       ALLERGIES    No Known Allergies    MEDICATIONS    Current Outpatient Medications on File Prior to Encounter   Medication Sig Dispense Refill    glucose monitoring (FREESTYLE FREEDOM) kit 1 kit by Does not apply route daily 1 kit 0    blood glucose monitor strips Test 4 times a day & as needed for symptoms of irregular blood glucose. Dispense sufficient amount for indicated testing frequency plus additional to accommodate PRN testing needs.  300 strip 0    Lancets MISC 1 each by Does not apply route 4 times daily 100 each 5    TOUJEO SOLOSTAR 300 UNIT/ML SOPN INJECT 50 UNITS UNDER THE SKIN AT BEDTIME 5 pen 5    atorvastatin (LIPITOR) 10 MG tablet TAKE 1 TABLET EVERY DAY 90 tablet 3    metoclopramide (REGLAN) 5 MG tablet TAKE 1 TABLET EVERY DAY (Patient not taking: Reported on 5/27/2022) 90 tablet 1    furosemide (LASIX) 40 MG tablet TAKE 1 TABLET EVERY DAY 90 tablet 1    alendronate (FOSAMAX) 70 MG tablet TAKE 1 TABLET EVERY 7 DAYS 12 tablet 3    Alcohol Swabs (B-D SINGLE USE SWABS REGULAR) PADS USE THREE TIMES DAILY 100 each 11    Accu-Chek Softclix Lancets MISC TEST THREE TIMES DAILY (Patient not taking: Reported on 4/5/2022) 300 each 3    DROPLET PEN NEEDLES 32G X 4 MM MISC USE TO INJECT SUBCUTANEOUSLY EVERY  each 3    blood glucose test strips (ACCU-CHEK MAMI PLUS) strip TEST THREE TIMES DAILY AS NEEDED (Patient not taking: Reported on 4/5/2022) 300 strip 3    potassium chloride (KLOR-CON M) 20 MEQ extended release tablet TAKE 1 TABLET EVERY DAY 90 tablet 1    levothyroxine (SYNTHROID) 50 MCG tablet Take 1 tablet by mouth daily 30 tablet 2    FERREX 150 FORTE 150-1-25 MG-MG-MCG CAPS capsule TAKE 1 CAPSULE BY MOUTH TWICE DAILY 30 capsule 11    acetaZOLAMIDE (DIAMOX) 500 MG extended release capsule       TRAVATAN Z 0.004 % SOLN ophthalmic solution       brimonidine (ALPHAGAN) 0.2 % ophthalmic solution Place 1 drop into both eyes 2 times daily 1 Bottle 0    aspirin (ASPIRIN LOW DOSE) 81 MG EC tablet Take 1 tablet by mouth daily 30 tablet 12    dorzolamide-timolol (COSOPT) 22.3-6.8 MG/ML ophthalmic solution        No current facility-administered medications on file prior to encounter. REVIEW OF SYSTEMS    Review of Systems   Constitutional: Negative for chills and fever. Gastrointestinal: Negative for diarrhea, nausea and vomiting. Skin: Positive for wound. Objective: There were no vitals taken for this visit. Wt Readings from Last 3 Encounters:   06/29/22 169 lb (76.7 kg)   05/27/22 169 lb (76.7 kg)   04/05/22 159 lb (72.1 kg)       Physical Exam:  General:  Alert and oriented x3. In no acute distress. Lower Extremity Physical Exam:    Vascular: DP pulse on the left is palpable and not palpable on the right. PT pulse on the right is palpable and not palpable on the left. CFT <3 seconds to all digits, Bilateral.  Pitting edema, Bilateral.  Hair growth is absent to the level of the lower leg, Bilateral.     Neuro: Saph/sural/SP/DP/plantar sensation absent to light touch. Protective sensation is intact to 0/10 sites as tested with a 5.07g SWMF, Bilateral.     Musculoskeletal: EHL/FHL/GS/TA gross motor intact. Gross deformity is present, rocker bottom deformity right foot with prominence at the site of ulceration. Dermatologic: Open wound present to plantar right midfoot as documented in detail below. Wound base is fibrotic with slough. Leda-wound skin is hyperkeratotic. Negative probe to bone. There is no erythema. There is no purulent drainage. There is no fluctuance or crepitus.  Interdigital maceration absent, Bilateral.       Assessment:      Active Hospital Problems    Diagnosis Date Noted    Right foot ulcer, with fat layer exposed [facility]) [L97.512] 06/29/2022     Priority: Medium    Charcot's joint of foot, right [M14.671] 06/29/2022     Priority: Medium    Diabetic polyneuropathy associated with type 2 diabetes mellitus ([facility] [E11.42] 03/27/2019    Type II diabetes mellitus with peripheral circulatory disorder [facility]) [E11.51] 07/31/2018       Plan:     Treatment Note please see attached Discharge Instructions    PVR/PPG ordered and discussed the importance of obtaining this testing so that total contact cast can be applied. XR 3 views right foot reviewed, negative for OM    Discussed the importance of offloading for healing of a plantar neuropathic ulceration. Discussed NWB vs total contact cast.    Plan for total contact cast once noninvasive vascular testing is completed. Continue Crow for now and she is to call  to schedule appt for modification of CROW. Recommended that she limit ambulation as much as possible in the meantime. Coty to the wound base daily    Educated on signs and symptoms of infection. Instructed to call clinic immediately or go to ER if signs and symptoms of infection are present. RTC 1 week       Procedure Note  Indications:  Based on my examination of this patient's wound(s)/ulcer(s) today, debridement is required to promote healing and evaluate the wound base. Performed by: Yovanny Shelton DPM    Consent obtained:  Yes    Time out taken:  Yes    Pain Control: Anesthetic  Anesthetic: 2% Lidocaine Gel Topical       Debridement: Excisional Debridement    Using curette and tissue nippers the wound(s)/ulcer(s) was/were sharply debrided down through and including the removal of subcutaneous tissue. Devitalized Tissue Debrided:  fibrin, biofilm, slough and callus    Pre Debridement Measurements:  Are located in the [facility]  Documentation Flow Sheet    Wound/Ulcer #: 1    Post Debridement Measurements:  Wound/Ulcer Descriptions are Pre Debridement except measurements:    Wound 06/29/22 Foot Right;Plantar (Active)   Wound Image   06/29/22 0826   Wound Etiology Diabetic [place] 2 07/06/22 0827   Dressing Status New drainage noted; Old drainage noted 07/06/22 0827   Wound Cleansed Cleansed with saline 07/06/22 0827   Offloading for Diabetic Foot Ulcers Offloading ordered; Offloading boot 07/06/22 0827   Wound Length (cm) 1.6 cm 07/06/22 0827   Wound Width (cm) 1.3 cm 07/06/22 0827   Wound Depth (cm) 0.5 cm 07/06/22 0827   Wound Surface Area (cm^2) 2.08 cm^2 07/06/22 0827   Change in Wound Size % (l*w) 31.58 07/06/22 0827   Wound Volume (cm^3) 1.04 cm^3 07/06/22 0827   Wound Healing % 43 07/06/22 0827   Post-Procedure Length (cm) 1.7 cm 07/06/22 0827   Post-Procedure Width (cm) 1.4 cm 07/06/22 0827   Post-Procedure Depth (cm) 0.5 cm 07/06/22 0827   Post-Procedure Surface Area (cm^2) 2.38 cm^2 07/06/22 0827   Post-Procedure Volume (cm^3) 1.19 cm^3 07/06/22 0827   Wound Assessment [place]/red;Slough 07/06/22 0827   Drainage Amount Moderate 07/06/22 0827   Drainage Description Yellow;Serosanguinous 07/06/22 0827   Odor None 07/06/22 0827   Leda-wound Assessment Maceration; Hyperkeratosis (callous) 07/06/22 0827   Margins Defined edges 07/06/22 0827   Wound Thickness Description not for Pressure Injury Full thickness 07/06/22 0827   Number of days: 7          Percent of Wound(s)/Ulcer(s) Debrided: 100%    Total Surface Area Debrided:  2.38 sq cm     Diabetic/Pressure/Non Pressure Ulcers only:  Ulcer: Diabetic ulcer, fat layer exposed     Estimated Blood Loss:  Estimated amount of blood loss is 2ml. Hemostasis Achieved:  by pressure    Response to treatment:  Well tolerated by patient. Written patient discharge instructions given to patient and signed by patient or POA.       Orders Placed This Encounter   Medications    lidocaine (XYLOCAINE) 2 % uro-jet     Orders Placed This Encounter   Procedures    Initiate Outpatient Wound Care Protocol     Cleanse wound with saline    If wound contains bioburden or contamination cleanse with wound cleanser or antimicrobial solution     For normal periwound tissue without irritation nor maceration, apply topical skin protectant    For periwound tissue with irritation and/or maceration, apply zinc based product, topical steroid cream/ointment, or equivalent     For wounds with dry firm black eschar and/or without exudate, apply betadine and leave open to air      For wounds with scant/small to no exudate or drainage, apply wound gel, hydrocolloid, polymer, or equivalent and cover with secondary dressing/foam      For wounds with moderate/large exudate or drainage, apply alginate, hydrofiber, polymer, or equivalent and cover with secondary dressing/foam    For wounds with nonviable tissue requiring removal, apply chemical or mechanical debrider and cover with secondary dressing/foam    For wounds with tunneling, dead space, or cavity, fill or pack with strip/gauze/kerlex to fit and cover with secondary dressing/foam    For wounds with adequate granulation or epithelization, apply wound gel, hydrocolloid, polymer, collagen, or transparent film, and cover secondary dry dressing/foam    For wounds that need additional secondary dressing to help pad or control additional drainage/exudates, add foam, absorbent pad or hydrocolloid    For wounds with suspected or known infection, apply antimicrobial mesh and/or antimicrobial alginate/hydrofiber, or antimicrobial solution moistened gauze/kerlex, or equivalent and cover with secondary dressing/foam    Compression Management needed for edema control, apply multilayer compression or tubular garment or equivalent    Offloading Management needed for pressure relief, apply offloading shoe/boot or equivalent     Standing Status:   Standing     Number of Occurrences:   1          Discharge Instructions          [address] [facility] -Phone: [phone] Fax: [phone]    Visit  Discharge Instructions / Physician Orders     DATE: 7/6/2022     Home Care:      SUPPLIES ORDERED THRU: [employer]     Wound Location: Right Plantar Foot     Cleanse with: Liquid antibacterial soap and water, rinse well      Dressing Orders: Coty to wound, Silicon Border Dressing     Frequency: Daily     Additional Orders: Increase protein to diet (meat, cheese, eggs, fish, peanut butter, nuts and beans)  Multivitamin daily  ELEVATE LEGS AS MUCH AS POSSIBLE  6/29/22- X-Ray Ordered   6/29/22- Vascular Studies Ordered     [facility] 1 week with Dr. Krysten Cobos     (Please note your next appointment above and if you are unable to keep, kindly give a 24 hour notice. Thank you.)  If more than 15 min late we cannot guarantee you will be seen due to clinician schedule  Per Policy, Excessive cancellation will call for dismissal from program.     If you experience any of the following, please call the [address] [place] [address] during business hours:  [phone]  Your Phone call may be forwarded to [place] during business hours that [facility] is closed.     * Increase in Pain  * Temperature over 101  * Increase in drainage from your wound  * Drainage with a foul odor  * Bleeding  * Increase in swelling  * Need for compression bandage changes due to slippage, breakthrough drainage.     If you need medical attention outside of the business hours of the [address] [place] [address] please contact your PCP or go to the nearest emergency room. The information contained in the After Visit Summary has been reviewed with me, the patient and/or responsible adult, by my health care provider(s). I had the opportunity to ask questions regarding this information. I have elected to receive;      []? After Visit Summary  [x]? Comprehensive Discharge Instruction        Patient signature______________________________________Date:________  Electronically signed by El Garcia RN on 7/6/2022 at 9:10 AM  Electronically signed by Aruna Hall DPM on 7/6/2022 at 8:31 AM          Electronically signed by Aruna Hall DPM on 7/6/2022 at 9:14 AM

## 2022-07-13 ENCOUNTER — HOSPITAL ENCOUNTER (OUTPATIENT)
Dept: WOUND CARE | Age: 72
Discharge: HOME | End: 2022-07-13
Payer: MEDICARE

## 2022-07-13 VITALS
HEIGHT: 69 IN | TEMPERATURE: 97.4 F | SYSTOLIC BLOOD PRESSURE: 140 MMHG | BODY MASS INDEX: 25.03 KG/M2 | WEIGHT: 169 LBS | DIASTOLIC BLOOD PRESSURE: 68 MMHG | HEART RATE: 65 BPM

## 2022-07-13 DIAGNOSIS — L97.512 RIGHT FOOT ULCER, WITH FAT LAYER EXPOSED (HCC): Primary | ICD-10-CM

## 2022-07-13 PROCEDURE — 11042 DBRDMT SUBQ TIS 1ST 20SQCM/<: CPT

## 2022-07-13 RX ORDER — BACITRACIN, NEOMYCIN, POLYMYXIN B 400; 3.5; 5 [USP'U]/G; MG/G; [USP'U]/G
OINTMENT TOPICAL ONCE
Status: CANCELLED | OUTPATIENT
Start: 2022-07-13 | End: 2022-07-13

## 2022-07-13 RX ORDER — CLOBETASOL PROPIONATE 0.5 MG/G
OINTMENT TOPICAL ONCE
Status: CANCELLED | OUTPATIENT
Start: 2022-07-13 | End: 2022-07-13

## 2022-07-13 RX ORDER — LIDOCAINE HYDROCHLORIDE 20 MG/ML
JELLY TOPICAL ONCE
Status: CANCELLED | OUTPATIENT
Start: 2022-07-13 | End: 2022-07-13

## 2022-07-13 RX ORDER — LIDOCAINE 40 MG/G
CREAM TOPICAL ONCE
Status: CANCELLED | OUTPATIENT
Start: 2022-07-13 | End: 2022-07-13

## 2022-07-13 RX ORDER — LIDOCAINE 50 MG/G
OINTMENT TOPICAL ONCE
Status: CANCELLED | OUTPATIENT
Start: 2022-07-13 | End: 2022-07-13

## 2022-07-13 RX ORDER — GENTAMICIN SULFATE 1 MG/G
OINTMENT TOPICAL ONCE
Status: CANCELLED | OUTPATIENT
Start: 2022-07-13 | End: 2022-07-13

## 2022-07-13 RX ORDER — BACITRACIN ZINC AND POLYMYXIN B SULFATE 500; 1000 [USP'U]/G; [USP'U]/G
OINTMENT TOPICAL ONCE
Status: CANCELLED | OUTPATIENT
Start: 2022-07-13 | End: 2022-07-13

## 2022-07-13 RX ORDER — LIDOCAINE HYDROCHLORIDE 40 MG/ML
SOLUTION TOPICAL ONCE
Status: CANCELLED | OUTPATIENT
Start: 2022-07-13 | End: 2022-07-13

## 2022-07-13 RX ORDER — GINSENG 100 MG
CAPSULE ORAL ONCE
Status: CANCELLED | OUTPATIENT
Start: 2022-07-13 | End: 2022-07-13

## 2022-07-13 RX ORDER — LIDOCAINE HYDROCHLORIDE 20 MG/ML
JELLY TOPICAL ONCE
Status: COMPLETED | OUTPATIENT
Start: 2022-07-13 | End: 2022-07-13

## 2022-07-13 RX ORDER — BETAMETHASONE DIPROPIONATE 0.05 %
OINTMENT (GRAM) TOPICAL ONCE
Status: CANCELLED | OUTPATIENT
Start: 2022-07-13 | End: 2022-07-13

## 2022-07-13 RX ADMIN — LIDOCAINE HYDROCHLORIDE 6 ML: 20 JELLY TOPICAL at 08:56

## 2022-07-13 ASSESSMENT — ENCOUNTER SYMPTOMS
VOMITING: 0
DIARRHEA: 0
NAUSEA: 0

## 2022-07-13 NOTE — PROGRESS NOTES
[address]   Progress Note and Procedure Note      Trent Estimable  MEDICAL RECORD NUMBER:  [id number]  AGE: 81 y.o. GENDER: female  : 1950  EPISODE DATE:  2022    Subjective:     Chief Complaint   Patient presents with    Wound Check     rle         HISTORY of PRESENT ILLNESS HPI     Trent Estimable is a 81 y.o. female who presents today for wound/ulcer evaluation. Interval history:  XR was negative for sign of osteomyelitis. Jennifer Oviedo has scheduled the noninvasive vascular testing for . States she was unable to get an appointment for [place] to [facility]. States that she is not able to use a knee scooter, crutches, or a walker to maintain nonweightbearing to the right lower extremity. History of Wound Context: Jennifer Oviedo presents for evaluation of plantar right foot ulceration. She states that it has been open for about 2 months now. She was seeing a podiatrist for this and was referred to the wound care clinic. She presents in a Crow on the right and a custom diabetic shoe on the left. She does have a history of Charcot. Has not had any sign or symptom of infection.     Ulcer Identification:  Ulcer Type: diabetic and neuropathic  Contributing Factors: edema, lymphedema, diabetes, chronic pressure and shear force          PAST MEDICAL HISTORY        Diagnosis Date    Diabetic polyneuropathy associated with type 1 diabetes mellitus ([address] 3/27/2019    Diabetic ulcer of left foot associated with diabetes mellitus due to underlying condition, with fat layer exposed ([address] 3/27/2019    Type 2 diabetes mellitus with hyperglycemia, with long-term current use of insulin ([address] 2018    Type II or unspecified type diabetes mellitus without mention of complication, not stated as uncontrolled        PAST SURGICAL HISTORY    Past Surgical History:   Procedure Laterality Date    FOOT SURGERY  +10years    R foot        FAMILY HISTORY    Family History   Problem Relation Age of Onset    Diabetes Mother     Diabetes Brother        SOCIAL HISTORY    Social History     Tobacco Use    Smoking status: Never Smoker    Smokeless tobacco: Never Used   Vaping Use    Vaping Use: Never used   Substance Use Topics    Alcohol use: No    Drug use: No       ALLERGIES    No Known Allergies    MEDICATIONS    Current Outpatient Medications on File Prior to Encounter   Medication Sig Dispense Refill    glucose monitoring (FREESTYLE FREEDOM) kit 1 kit by Does not apply route daily 1 kit 0    blood glucose monitor strips Test 4 times a day & as needed for symptoms of irregular blood glucose. Dispense sufficient amount for indicated testing frequency plus additional to accommodate PRN testing needs.  300 strip 0    Lancets MISC 1 each by Does not apply route 4 times daily 100 each 5    TOUJEO SOLOSTAR 300 UNIT/ML SOPN INJECT 50 UNITS UNDER THE SKIN AT BEDTIME 5 pen 5    atorvastatin (LIPITOR) 10 MG tablet TAKE 1 TABLET EVERY DAY 90 tablet 3    metoclopramide (REGLAN) 5 MG tablet TAKE 1 TABLET EVERY DAY (Patient not taking: Reported on 5/27/2022) 90 tablet 1    furosemide (LASIX) 40 MG tablet TAKE 1 TABLET EVERY DAY 90 tablet 1    alendronate (FOSAMAX) 70 MG tablet TAKE 1 TABLET EVERY 7 DAYS 12 tablet 3    Alcohol Swabs (B-D SINGLE USE SWABS REGULAR) PADS USE THREE TIMES DAILY 100 each 11    Accu-Chek Softclix Lancets MISC TEST THREE TIMES DAILY (Patient not taking: Reported on 4/5/2022) 300 each 3    DROPLET PEN NEEDLES 32G X 4 MM MISC USE TO INJECT SUBCUTANEOUSLY EVERY  each 3    blood glucose test strips (ACCU-CHEK MAMI PLUS) strip TEST THREE TIMES DAILY AS NEEDED (Patient not taking: Reported on 4/5/2022) 300 strip 3    potassium chloride (KLOR-CON M) 20 MEQ extended release tablet TAKE 1 TABLET EVERY DAY 90 tablet 1    levothyroxine (SYNTHROID) 50 MCG tablet Take 1 tablet by mouth daily 30 tablet 2    FERREX 150 FORTE 150-1-25 MG-MG-MCG CAPS capsule TAKE 1 CAPSULE BY MOUTH TWICE DAILY 30 capsule 11    acetaZOLAMIDE (DIAMOX) 500 MG extended release capsule       TRAVATAN Z 0.004 % SOLN ophthalmic solution       brimonidine (ALPHAGAN) 0.2 % ophthalmic solution Place 1 drop into both eyes 2 times daily 1 Bottle 0    aspirin (ASPIRIN LOW DOSE) 81 MG EC tablet Take 1 tablet by mouth daily 30 tablet 12    dorzolamide-timolol (COSOPT) 22.3-6.8 MG/ML ophthalmic solution        No current facility-administered medications on file prior to encounter. REVIEW OF SYSTEMS    Review of Systems   Constitutional: Negative for chills and fever. Gastrointestinal: Negative for diarrhea, nausea and vomiting. Skin: Positive for wound. Objective:      BP (!) 140/68   Pulse 65   Temp 97.4 °F (36.3 °C) (Tympanic)   Ht 5' 9\" (1.753 m)   Wt 169 lb (76.7 kg)   BMI 24.96 kg/m²     Wt Readings from Last 3 Encounters:   07/13/22 169 lb (76.7 kg)   07/06/22 169 lb (76.7 kg)   06/29/22 169 lb (76.7 kg)       Physical Exam:  General:  Alert and oriented x3. In no acute distress. Lower Extremity Physical Exam:    Vascular: DP pulse on the left is palpable and not palpable on the right. PT pulse on the right is palpable and not palpable on the left. CFT <3 seconds to all digits, Bilateral.  Pitting edema, Bilateral.  Hair growth is absent to the level of the lower leg, Bilateral.     Neuro: Saph/sural/SP/DP/plantar sensation absent to light touch. Protective sensation is intact to 0/10 sites as tested with a 5.07g SWMF, Bilateral.     Musculoskeletal: EHL/FHL/GS/TA gross motor intact. Gross deformity is present, rocker bottom deformity right foot with prominence at the site of ulceration. Dermatologic: Open wound present to plantar right midfoot as documented in detail below. Wound base is fibrotic with slough. Leda-wound skin is hyperkeratotic. Negative probe to bone. There is no erythema. There is no purulent drainage.   There is no fluctuance or crepitus. Interdigital maceration absent, Bilateral.       Assessment:      Active Hospital Problems    Diagnosis Date Noted    Right foot ulcer, with fat layer exposed ([facility] [L97.512] 06/29/2022     Priority: Medium    Charcot's joint of foot, right [M14.671] 06/29/2022     Priority: Medium    Diabetic polyneuropathy associated with type 2 diabetes mellitus ([facility] [E11.42] 03/27/2019    Type II diabetes mellitus with peripheral circulatory disorder ([facility] [E11.51] 07/31/2018       Plan:     Treatment Note please see attached Discharge Instructions    PVR/PPG ordered and discussed the importance of obtaining this testing so that total contact cast can be applied. Discussed the importance of offloading for healing of a plantar neuropathic ulceration. Discussed NWB vs total contact cast.    Plan for total contact cast once noninvasive vascular testing is completed. Continue Crow for now and she is to call  to schedule appt for modification of CROW. Recommended that she limit ambulation as much as possible in the meantime. Coty to the wound base daily    Educated on signs and symptoms of infection. Instructed to call clinic immediately or go to ER if signs and symptoms of infection are present. RTC 1 week       Procedure Note  Indications:  Based on my examination of this patient's wound(s)/ulcer(s) today, debridement is required to promote healing and evaluate the wound base. Performed by: Simone Miller DPM    Consent obtained:  Yes    Time out taken:  Yes    Pain Control: Anesthetic  Anesthetic: 2% Lidocaine Gel Topical       Debridement: Excisional Debridement    Using curette and tissue nippers the wound(s)/ulcer(s) was/were sharply debrided down through and including the removal of subcutaneous tissue.         Devitalized Tissue Debrided:  fibrin, biofilm, slough and callus    Pre Debridement Measurements:  Are located in the [facility]  Documentation Flow Sheet    Wound/Ulcer #: 1    Post Debridement Measurements:  Wound/Ulcer Descriptions are Pre Debridement except measurements:    Wound 06/29/22 Foot Right;Plantar (Active)   Wound Image   06/29/22 0826   Wound Etiology Diabetic [place] 2 07/13/22 0848   Dressing Status New drainage noted; Old drainage noted 07/13/22 0848   Wound Cleansed Cleansed with saline 07/13/22 0848   Offloading for Diabetic Foot Ulcers Offloading ordered; Offloading boot 07/13/22 0848   Wound Length (cm) 1.5 cm 07/13/22 0848   Wound Width (cm) 1.6 cm 07/13/22 0848   Wound Depth (cm) 0.6 cm 07/13/22 0848   Wound Surface Area (cm^2) 2.4 cm^2 07/13/22 0848   Change in Wound Size % (l*w) 21.05 07/13/22 0848   Wound Volume (cm^3) 1.44 cm^3 07/13/22 0848   Wound Healing % 21 07/13/22 0848   Post-Procedure Length (cm) 1.5 cm 07/13/22 0848   Post-Procedure Width (cm) 1.6 cm 07/13/22 0848   Post-Procedure Depth (cm) 0.6 cm 07/13/22 0848   Post-Procedure Surface Area (cm^2) 2.4 cm^2 07/13/22 0848   Post-Procedure Volume (cm^3) 1.44 cm^3 07/13/22 0848   Wound Assessment [place]/red;Slough 07/13/22 0848   Drainage Amount Moderate 07/13/22 0848   Drainage Description Yellow 07/13/22 0848   Odor Mild 07/13/22 0848   Leda-wound Assessment Maceration; Hyperkeratosis (callous) 07/13/22 0848   Margins Defined edges 07/13/22 0848   Wound Thickness Description not for Pressure Injury Full thickness 07/13/22 0848   Number of days: 14          Percent of Wound(s)/Ulcer(s) Debrided: 100%    Total Surface Area Debrided:  2.4 sq cm     Diabetic/Pressure/Non Pressure Ulcers only:  Ulcer: Diabetic ulcer, fat layer exposed     Estimated Blood Loss:  Estimated amount of blood loss is 2ml. Hemostasis Achieved:  by pressure    Response to treatment:  Well tolerated by patient. Written patient discharge instructions given to patient and signed by patient or POA.       Orders Placed This Encounter   Medications    lidocaine (XYLOCAINE) 2 % uro-jet     Orders Placed This Encounter   Procedures    [facility]ENTER -Phone: [phone] Fax: [phone]    Visit  Discharge Instructions / Physician Orders     DATE: 7/13/2022     Home Care:      SUPPLIES ORDERED THRU: [facility]     Wound Location: Right Plantar Foot     Cleanse with: Liquid antibacterial soap and water, rinse well      Dressing Orders: Coty to wound, Silicon Border Dressing     Frequency: Daily     Additional Orders: Increase protein to diet (meat, cheese, eggs, fish, peanut butter, nuts and beans)  Multivitamin daily  ELEVATE LEGS AS MUCH AS POSSIBLE  6/29/22- X-Ray Ordered   6/29/22- Vascular Studies Ordered     Your next appointment with [facility] is in 2 weeks with Dr. Satnam Medrano     (Please note your next appointment above and if you are unable to keep, kindly give a 24 hour notice. Thank you.)  If more than 15 min late we cannot guarantee you will be seen due to clinician schedule  Per Policy, Excessive cancellation will call for dismissal from program.     If you experience any of the following, please call the [facility] during business hours:  [phone]  Your Phone call may be forwarded to [facility] during business hours that [facility] is closed.     * Increase in Pain  * Temperature over 101  * Increase in drainage from your wound  * Drainage with a foul odor  * Bleeding  * Increase in swelling  * Need for compression bandage changes due to slippage, breakthrough drainage.     If you need medical attention outside of the business hours of the [facility] please contact your PCP or go to the nearest emergency room.     The information contained in the After Visit Summary has been reviewed with me, the patient and/or responsible adult, by my health care provider(s). I had the opportunity to ask questions regarding this information. I have elected to receive;      []? ? After Visit Summary  [x]? ? Comprehensive Discharge Instruction        Patient signature______________________________________Date:________  Electronically signed by Travis Arthur RN on 7/13/2022 at 9:23 AM   Electronically signed by Tom Cutler DPM on 7/13/2022 at 8:40 AM          Electronically signed by Tom Cutler DPM on 7/13/2022 at 9:25 AM

## 2022-07-13 NOTE — PLAN OF CARE
Problem: Chronic Conditions and Co-morbidities  Goal: Patient's chronic conditions and co-morbidity symptoms are monitored and maintained or improved  Outcome: Progressing     Problem: Discharge Planning  Goal: Discharge to home or other facility with appropriate resources  Outcome: Progressing     Problem: Wound:  Goal: Will show signs of wound healing; wound closure and no evidence of infection  Description: Will show signs of wound healing; wound closure and no evidence of infection  Outcome: Progressing     Problem: Falls - Risk of:  Goal: Will remain free from falls  Description: Will remain free from falls  Outcome: Progressing
bilateral lower extremity strength 3+ to 4-/5 throughout, assessed in sitting

## 2022-07-26 NOTE — DISCHARGE INSTRUCTIONS
[address] [facility] -Phone: [phone] Fax: [phone]    Visit  Discharge Instructions / Physician Orders     DATE: 7/27/2022     Home Care:     SUPPLIES ORDERED THRU: [facility]     Wound Location: Right Plantar Foot     Cleanse with: Liquid antibacterial soap and water, rinse well      Dressing Orders: Coty to wound, Silicone Border Dressing     Frequency: Daily     Additional Orders: Increase protein to diet (meat, cheese, eggs, fish, peanut butter, nuts and beans)  Multivitamin daily  ELEVATE LEGS AS MUCH AS POSSIBLE  6/29/22- X-Ray Ordered  6/29/22- Vascular Studies Ordered     [facility] 1 [facility] with Dr. Mariama Griffin     (Please note your next appointment above and if you are unable to keep, kindly give a 24 hour notice. Thank you.)  If more than 15 min late we cannot guarantee you will be seen due to clinician schedule  Per Policy, Excessive cancellation will call for dismissal from program.     If you experience any of the following, please call the [address] [place] [address] during business hours:  [phone]  Your Phone call may be forwarded to [facility] during business hours that [facility] is closed. * Increase in Pain  * Temperature over 101  * Increase in drainage from your wound  * Drainage with a foul odor  * Bleeding  * Increase in swelling  * Need for compression bandage changes due to slippage, breakthrough drainage. If you need medical attention outside of the business hours of the [address] [place] [address] please contact your PCP or go to the nearest emergency room. The information contained in the After Visit Summary has been reviewed with me, the patient and/or responsible adult, by my health care provider(s). I had the opportunity to ask questions regarding this information.  I have elected to receive;      []After Visit Summary  [x]Comprehensive Discharge Instruction        Patient signature______________________________________Date:________  Electronically signed by Ra Whitehead RN on 7/27/2022 at 8:45 AM  Electronically signed by Vanessa Riddle DPM on 7/27/2022 at 8:27 AM

## 2022-07-27 ENCOUNTER — HOSPITAL ENCOUNTER (OUTPATIENT)
Dept: WOUND CARE | Age: 72
Discharge: HOME | End: 2022-07-27
Payer: MEDICARE

## 2022-07-27 VITALS
BODY MASS INDEX: 25.03 KG/M2 | HEART RATE: 68 BPM | RESPIRATION RATE: 18 BRPM | HEIGHT: 69 IN | WEIGHT: 169 LBS | TEMPERATURE: 97 F | DIASTOLIC BLOOD PRESSURE: 52 MMHG | SYSTOLIC BLOOD PRESSURE: 92 MMHG

## 2022-07-27 DIAGNOSIS — L97.512 RIGHT FOOT ULCER, WITH FAT LAYER EXPOSED (HCC): Primary | ICD-10-CM

## 2022-07-27 PROCEDURE — 11042 DBRDMT SUBQ TIS 1ST 20SQCM/<: CPT

## 2022-07-27 RX ORDER — LIDOCAINE HYDROCHLORIDE 20 MG/ML
JELLY TOPICAL ONCE
Status: CANCELLED | OUTPATIENT
Start: 2022-07-27 | End: 2022-07-27

## 2022-07-27 RX ORDER — BETAMETHASONE DIPROPIONATE 0.05 %
OINTMENT (GRAM) TOPICAL ONCE
Status: CANCELLED | OUTPATIENT
Start: 2022-07-27 | End: 2022-07-27

## 2022-07-27 RX ORDER — LIDOCAINE 50 MG/G
OINTMENT TOPICAL ONCE
Status: CANCELLED | OUTPATIENT
Start: 2022-07-27 | End: 2022-07-27

## 2022-07-27 RX ORDER — BACITRACIN ZINC AND POLYMYXIN B SULFATE 500; 1000 [USP'U]/G; [USP'U]/G
OINTMENT TOPICAL ONCE
Status: CANCELLED | OUTPATIENT
Start: 2022-07-27 | End: 2022-07-27

## 2022-07-27 RX ORDER — CLOBETASOL PROPIONATE 0.5 MG/G
OINTMENT TOPICAL ONCE
Status: CANCELLED | OUTPATIENT
Start: 2022-07-27 | End: 2022-07-27

## 2022-07-27 RX ORDER — GINSENG 100 MG
CAPSULE ORAL ONCE
Status: CANCELLED | OUTPATIENT
Start: 2022-07-27 | End: 2022-07-27

## 2022-07-27 RX ORDER — GENTAMICIN SULFATE 1 MG/G
OINTMENT TOPICAL ONCE
Status: CANCELLED | OUTPATIENT
Start: 2022-07-27 | End: 2022-07-27

## 2022-07-27 RX ORDER — BACITRACIN, NEOMYCIN, POLYMYXIN B 400; 3.5; 5 [USP'U]/G; MG/G; [USP'U]/G
OINTMENT TOPICAL ONCE
Status: CANCELLED | OUTPATIENT
Start: 2022-07-27 | End: 2022-07-27

## 2022-07-27 RX ORDER — LIDOCAINE HYDROCHLORIDE 40 MG/ML
SOLUTION TOPICAL ONCE
Status: CANCELLED | OUTPATIENT
Start: 2022-07-27 | End: 2022-07-27

## 2022-07-27 RX ORDER — LIDOCAINE HYDROCHLORIDE 20 MG/ML
JELLY TOPICAL ONCE
Status: COMPLETED | OUTPATIENT
Start: 2022-07-27 | End: 2022-07-27

## 2022-07-27 RX ORDER — LIDOCAINE 40 MG/G
CREAM TOPICAL ONCE
Status: CANCELLED | OUTPATIENT
Start: 2022-07-27 | End: 2022-07-27

## 2022-07-27 RX ADMIN — LIDOCAINE HYDROCHLORIDE 6 ML: 20 JELLY TOPICAL at 08:26

## 2022-07-27 ASSESSMENT — ENCOUNTER SYMPTOMS
NAUSEA: 0
DIARRHEA: 0
VOMITING: 0

## 2022-07-27 NOTE — PROGRESS NOTES
History   Problem Relation Age of Onset    Diabetes Mother     Diabetes Brother        SOCIAL HISTORY    Social History     Tobacco Use    Smoking status: Never    Smokeless tobacco: Never   Vaping Use    Vaping Use: Never used   Substance Use Topics    Alcohol use: No    Drug use: No       ALLERGIES    No Known Allergies    MEDICATIONS    Current Outpatient Medications on File Prior to Encounter   Medication Sig Dispense Refill    glucose monitoring (FREESTYLE FREEDOM) kit 1 kit by Does not apply route daily 1 kit 0    blood glucose monitor strips Test 4 times a day & as needed for symptoms of irregular blood glucose. Dispense sufficient amount for indicated testing frequency plus additional to accommodate PRN testing needs.  300 strip 0    Lancets MISC 1 each by Does not apply route 4 times daily 100 each 5    TOUJEO SOLOSTAR 300 UNIT/ML SOPN INJECT 50 UNITS UNDER THE SKIN AT BEDTIME 5 pen 5    atorvastatin (LIPITOR) 10 MG tablet TAKE 1 TABLET EVERY DAY 90 tablet 3    metoclopramide (REGLAN) 5 MG tablet TAKE 1 TABLET EVERY DAY (Patient not taking: Reported on 5/27/2022) 90 tablet 1    furosemide (LASIX) 40 MG tablet TAKE 1 TABLET EVERY DAY 90 tablet 1    alendronate (FOSAMAX) 70 MG tablet TAKE 1 TABLET EVERY 7 DAYS 12 tablet 3    Alcohol Swabs (B-D SINGLE USE SWABS REGULAR) PADS USE THREE TIMES DAILY 100 each 11    Accu-Chek Softclix Lancets MISC TEST THREE TIMES DAILY (Patient not taking: Reported on 4/5/2022) 300 each 3    DROPLET PEN NEEDLES 32G X 4 MM MISC USE TO INJECT SUBCUTANEOUSLY EVERY  each 3    blood glucose test strips (ACCU-CHEK MAMI PLUS) strip TEST THREE TIMES DAILY AS NEEDED (Patient not taking: Reported on 4/5/2022) 300 strip 3    potassium chloride (KLOR-CON M) 20 MEQ extended release tablet TAKE 1 TABLET EVERY DAY 90 tablet 1    levothyroxine (SYNTHROID) 50 MCG tablet Take 1 tablet by mouth daily 30 tablet 2    FERREX 150 FORTE 150-1-25 MG-MG-MCG CAPS capsule TAKE 1 CAPSULE BY MOUTH TWICE DAILY 30 capsule 11    acetaZOLAMIDE (DIAMOX) 500 MG extended release capsule       TRAVATAN Z 0.004 % SOLN ophthalmic solution       brimonidine (ALPHAGAN) 0.2 % ophthalmic solution Place 1 drop into both eyes 2 times daily 1 Bottle 0    aspirin (ASPIRIN LOW DOSE) 81 MG EC tablet Take 1 tablet by mouth daily 30 tablet 12    dorzolamide-timolol (COSOPT) 22.3-6.8 MG/ML ophthalmic solution        No current facility-administered medications on file prior to encounter. REVIEW OF SYSTEMS    Review of Systems   Constitutional:  Negative for chills and fever. Gastrointestinal:  Negative for diarrhea, nausea and vomiting. Skin:  Positive for wound. Objective:      BP (!) 92/52   Pulse 68   Temp 97 °F (36.1 °C) (Tympanic)   Resp 18   Ht 5' 9\" (1.753 m)   Wt 169 lb (76.7 kg)   BMI 24.96 kg/m²     Wt Readings from Last 3 Encounters:   07/27/22 169 lb (76.7 kg)   07/13/22 169 lb (76.7 kg)   07/06/22 169 lb (76.7 kg)       Physical Exam:  General:  Alert and oriented x3. In no acute distress. Lower Extremity Physical Exam:    Vascular: DP pulse on the left is palpable and not palpable on the right. PT pulse on the right is palpable and not palpable on the left. CFT <3 seconds to all digits, Bilateral.  Pitting edema, Bilateral.  Hair growth is absent to the level of the lower leg, Bilateral.     Neuro: Saph/sural/SP/DP/plantar sensation absent to light touch. Protective sensation is intact to  0 /10 sites as tested with a 5.07g SWMF, Bilateral.     Musculoskeletal: EHL/FHL/GS/TA gross motor intact. Gross deformity is present, rocker bottom deformity right foot with prominence at the site of ulceration. Dermatologic: Open wound present to plantar right midfoot as documented in detail below. Wound base is fibrotic with slough. Leda-wound skin is hyperkeratotic. Negative probe to bone. There is no erythema. There is no purulent drainage. There is no fluctuance or crepitus.  Interdigital maceration absent, Bilateral.       Assessment:      Active Hospital Problems    Diagnosis Date Noted    Right foot ulcer, with fat layer exposed ([facility] [L97.512] 06/29/2022     Priority: Medium    Charcot's joint of foot, right [M14.671] 06/29/2022     Priority: Medium    Diabetic polyneuropathy associated with type 2 diabetes mellitus ([facility] [E11.42] 03/27/2019    Type II diabetes mellitus with peripheral circulatory disorder [facility] [E11.51] 07/31/2018       Plan:     Treatment Note please see attached Discharge Instructions    PVR/PPG ordered and discussed the importance of obtaining this testing so that total contact cast can be applied. Discussed the importance of offloading for healing of a plantar neuropathic ulceration. Discussed NWB vs total contact cast.    Plan for total contact cast once noninvasive vascular testing is completed. Libby Monahan for now and she is to follow-up with [facility] for modification of CROW. Recommended that she limit ambulation as much as possible in the meantime. Coty to the wound base daily    Educated on signs and symptoms of infection. Instructed to call clinic immediately or go to ER if signs and symptoms of infection are present. RTC 1 week       Procedure Note  Indications:  Based on my examination of this patient's wound(s)/ulcer(s) today, debridement is required to promote healing and evaluate the wound base. Performed by: Michelle Tavares DPM    Consent obtained:  Yes    Time out taken:  Yes    Pain Control: Anesthetic  Anesthetic: 2% Lidocaine Gel Topical       Debridement: Excisional Debridement    Using curette and tissue nippers the wound(s)/ulcer(s) was/were sharply debrided down through and including the removal of subcutaneous tissue.         Devitalized Tissue Debrided:  fibrin, biofilm, slough and callus    Pre Debridement Measurements:  Are located in the [facility]  Documentation Flow Sheet    Wound/Ulcer #: 1    Post Debridement Measurements:  Wound/Ulcer Descriptions are Pre Debridement except measurements:    Wound 06/29/22 Foot Right;Plantar (Active)   Wound Image   06/29/22 0826   Wound Etiology Diabetic [place] 2 07/13/22 0848   Dressing Status New drainage noted; Old drainage noted 07/27/22 0819   Wound Cleansed Cleansed with saline 07/27/22 0819   Offloading for Diabetic Foot Ulcers Offloading ordered; Offloading boot 07/27/22 0819   Wound Length (cm) 1.4 cm 07/27/22 0819   Wound Width (cm) 1.7 cm 07/27/22 0819   Wound Depth (cm) 0.4 cm 07/27/22 0819   Wound Surface Area (cm^2) 2.38 cm^2 07/27/22 0819   Change in Wound Size % (l*w) 21.71 07/27/22 0819   Wound Volume (cm^3) 0.952 cm^3 07/27/22 0819   Wound Healing % 48 07/27/22 0819   Post-Procedure Length (cm) 1.4 cm 07/27/22 0819   Post-Procedure Width (cm) 1.7 cm 07/27/22 0819   Post-Procedure Depth (cm) 0.4 cm 07/27/22 0819   Post-Procedure Surface Area (cm^2) 2.38 cm^2 07/27/22 0819   Post-Procedure Volume (cm^3) 0.952 cm^3 07/27/22 0819   Wound Assessment [place]/red;Slough 07/27/22 0819   Drainage Amount Moderate 07/27/22 0819   Drainage Description Yellow 07/27/22 0819   Odor Mild 07/27/22 0819   Leda-wound Assessment Maceration; Hyperkeratosis (callous) 07/27/22 0819   Margins Defined edges 07/27/22 0819   Wound Thickness Description not for Pressure Injury Full thickness 07/27/22 0819   Number of days: 28          Percent of Wound(s)/Ulcer(s) Debrided: 100%    Total Surface Area Debrided:  2.38 sq cm     Diabetic/Pressure/Non Pressure Ulcers only:  Ulcer: Diabetic ulcer, fat layer exposed     Estimated Blood Loss:  Estimated amount of blood loss is 2ml. Hemostasis Achieved:  by pressure    Response to treatment:  Well tolerated by patient. Written patient discharge instructions given to patient and signed by patient or POA.       Orders Placed This Encounter   Medications    lidocaine (XYLOCAINE) 2 % uro-jet     Orders Placed This Encounter   Procedures    Initiate Outpatient Wound Care Protocol Cleanse wound with saline    If wound contains bioburden or contamination cleanse with wound cleanser or antimicrobial solution     For normal periwound tissue without irritation nor maceration, apply topical skin protectant    For periwound tissue with irritation and/or maceration, apply zinc based product, topical steroid cream/ointment, or equivalent     For wounds with dry firm black eschar and/or without exudate, apply betadine and leave open to air      For wounds with scant/small to no exudate or drainage, apply wound gel, hydrocolloid, polymer, or equivalent and cover with secondary dressing/foam      For wounds with moderate/large exudate or drainage, apply alginate, hydrofiber, polymer, or equivalent and cover with secondary dressing/foam    For wounds with nonviable tissue requiring removal, apply chemical or mechanical debrider and cover with secondary dressing/foam    For wounds with tunneling, dead space, or cavity, fill or pack with strip/gauze/kerlex to fit and cover with secondary dressing/foam    For wounds with adequate granulation or epithelization, apply wound gel, hydrocolloid, polymer, collagen, or transparent film, and cover secondary dry dressing/foam    For wounds that need additional secondary dressing to help pad or control additional drainage/exudates, add foam, absorbent pad or hydrocolloid    For wounds with suspected or known infection, apply antimicrobial mesh and/or antimicrobial alginate/hydrofiber, or antimicrobial solution moistened gauze/kerlex, or equivalent and cover with secondary dressing/foam    Compression Management needed for edema control, apply multilayer compression or tubular garment or equivalent    Offloading Management needed for pressure relief, apply offloading shoe/boot or equivalent     Standing Status:   Standing     Number of Occurrences:   1          Discharge Instructions            [address] [facility],[address] -Phone: [phone] Fax: [phone]    Visit Discharge Instructions / Physician Orders     DATE: 7/27/2022     Home Care:     [facility]     Wound Location: Right Plantar Foot     Cleanse with: Liquid antibacterial soap and water, rinse well      Dressing Orders: Coty to wound, Silicone Border Dressing     Frequency: Daily     Additional Orders: Increase protein to diet (meat, cheese, eggs, fish, peanut butter, nuts and beans)  Multivitamin daily  ELEVATE LEGS AS MUCH AS POSSIBLE  6/29/22- X-Ray Ordered  6/29/22- Vascular Studies Ordered     Your next appointment with [address] [place] [address] is in 1 week with Dr. Patria Reed     (Please note your next appointment above and if you are unable to keep, kindly give a 24 hour notice. Thank you.)  If more than 15 min late we cannot guarantee you will be seen due to clinician schedule  Per Policy, Excessive cancellation will call for dismissal from program.     If you experience any of the following, please call the [address] [place] [address] during business hours:  [phone]  Your Phone call may be forwarded to [employer] during business hours that [facility] [address] [place] [address] is closed. * Increase in Pain  * Temperature over 101  * Increase in drainage from your wound  * Drainage with a foul odor  * Bleeding  * Increase in swelling  * Need for compression bandage changes due to slippage, breakthrough drainage. If you need medical attention outside of the business hours of the [address] [place] [address] please contact your PCP or go to the nearest emergency room. The information contained in the After Visit Summary has been reviewed with me, the patient and/or responsible adult, by my health care provider(s). I had the opportunity to ask questions regarding this information.  I have elected to receive;      []After Visit Summary  [x]Comprehensive Discharge Instruction        Patient signature______________________________________Date:________  Electronically signed by Ivory Bernstein RN on 7/27/2022 at 8:45 AM  Electronically signed by Simone Miller DPM on 7/27/2022 at 8:27 AM        Electronically signed by Simone Miller DPM on 7/27/2022 at 8:49 AM

## 2022-07-27 NOTE — PLAN OF CARE
Problem: Chronic Conditions and Co-morbidities  Goal: Patient's chronic conditions and co-morbidity symptoms are monitored and maintained or improved  Outcome: Progressing Towards Goal     Problem: Discharge Planning  Goal: Discharge to home or other facility with appropriate resources  Outcome: Progressing Towards Goal     Problem: Safety - Adult  Goal: Free from fall injury  Outcome: Progressing Towards Goal     Problem: Wound:  Goal: Will show signs of wound healing; wound closure and no evidence of infection  Description: Will show signs of wound healing; wound closure and no evidence of infection  Outcome: Progressing Towards Goal     Problem: Falls - Risk of:  Goal: Will remain free from falls  Description: Will remain free from falls  Outcome: Progressing Towards Goal

## 2022-07-29 ENCOUNTER — TELEPHONE (OUTPATIENT)
Dept: FAMILY MEDICINE CLINIC | Age: 72
End: 2022-07-29

## 2022-07-29 NOTE — TELEPHONE ENCOUNTER
Spoke with patient about retuning Cologuard kit. Pt states that she attempted to do it 2-3 times unsuccessfully and has decided not to do it anymore.

## 2022-08-01 ENCOUNTER — HOSPITAL ENCOUNTER (OUTPATIENT)
Dept: VASCULAR LAB | Age: 72
Discharge: HOME | End: 2022-08-01
Payer: MEDICARE

## 2022-08-01 DIAGNOSIS — E11.51 TYPE II DIABETES MELLITUS WITH PERIPHERAL CIRCULATORY DISORDER (HCC): ICD-10-CM

## 2022-08-01 DIAGNOSIS — L97.512 RIGHT FOOT ULCER, WITH FAT LAYER EXPOSED (HCC): ICD-10-CM

## 2022-08-01 PROCEDURE — 93923 UPR/LXTR ART STDY 3+ LVLS: CPT

## 2022-08-02 NOTE — DISCHARGE INSTRUCTIONS
[address] [facility],[address] -Phone: [phone] Fax: [phone]    Visit  Discharge Instructions / Physician Orders     DATE: 8/3/2022     Home Care:     [facility]     Wound Location: Right Plantar Foot     Cleanse with: Liquid antibacterial soap and water, rinse well      Dressing Orders: Coty to wound, Silicone Border Dressing     Frequency: Daily     Additional Orders: Increase protein to diet (meat, cheese, eggs, fish, peanut butter, nuts and beans)  Multivitamin daily  ELEVATE LEGS AS MUCH AS POSSIBLE  6/29/22- X-Ray Ordered  6/29/22- Vascular Studies Ordered     [facility] 2 [facility] with Dr. Jeremy Post     (Please note your next appointment above and if you are unable to keep, kindly give a 24 hour notice. Thank you.)  If more than 15 min late we cannot guarantee you will be seen due to clinician schedule  Per Policy, Excessive cancellation will call for dismissal from program.     If you experience any of the following, please call the [address] [place] [address] during business hours:  [phone]  Your Phone call may be forwarded to [employer] during business hours that [facility] is closed. * Increase in Pain  * Temperature over 101  * Increase in drainage from your wound  * Drainage with a foul odor  * Bleeding  * Increase in swelling  * Need for compression bandage changes due to slippage, breakthrough drainage. If you need medical attention outside of the business hours of the [address] [place] [address] please contact your PCP or go to the nearest emergency room. The information contained in the After Visit Summary has been reviewed with me, the patient and/or responsible adult, by my health care provider(s). I had the opportunity to ask questions regarding this information.  I have elected to receive;      []After Visit Summary  [x]Comprehensive Discharge Instruction        Patient signature______________________________________Date:________  Electronically signed by Parminder Black RN on 8/3/2022 at 8:52 AM  Electronically signed by Ar Dunham DPM on 8/3/2022 at 8:27 AM

## 2022-08-03 ENCOUNTER — HOSPITAL ENCOUNTER (OUTPATIENT)
Dept: WOUND CARE | Age: 72
Discharge: HOME | End: 2022-08-03
Payer: MEDICARE

## 2022-08-03 VITALS
DIASTOLIC BLOOD PRESSURE: 58 MMHG | RESPIRATION RATE: 16 BRPM | HEART RATE: 71 BPM | TEMPERATURE: 97.2 F | BODY MASS INDEX: 25.03 KG/M2 | HEIGHT: 69 IN | WEIGHT: 169 LBS | SYSTOLIC BLOOD PRESSURE: 119 MMHG

## 2022-08-03 DIAGNOSIS — L97.512 RIGHT FOOT ULCER, WITH FAT LAYER EXPOSED (HCC): Primary | ICD-10-CM

## 2022-08-03 PROBLEM — E11.51 TYPE II DIABETES MELLITUS WITH PERIPHERAL CIRCULATORY DISORDER (HCC): Status: RESOLVED | Noted: 2018-07-31 | Resolved: 2022-08-03

## 2022-08-03 PROCEDURE — 11042 DBRDMT SUBQ TIS 1ST 20SQCM/<: CPT

## 2022-08-03 RX ORDER — BETAMETHASONE DIPROPIONATE 0.05 %
OINTMENT (GRAM) TOPICAL ONCE
Status: CANCELLED | OUTPATIENT
Start: 2022-08-03 | End: 2022-08-03

## 2022-08-03 RX ORDER — LIDOCAINE 50 MG/G
OINTMENT TOPICAL ONCE
Status: CANCELLED | OUTPATIENT
Start: 2022-08-03 | End: 2022-08-03

## 2022-08-03 RX ORDER — LIDOCAINE HYDROCHLORIDE 20 MG/ML
JELLY TOPICAL ONCE
Status: COMPLETED | OUTPATIENT
Start: 2022-08-03 | End: 2022-08-03

## 2022-08-03 RX ORDER — CLOBETASOL PROPIONATE 0.5 MG/G
OINTMENT TOPICAL ONCE
Status: CANCELLED | OUTPATIENT
Start: 2022-08-03 | End: 2022-08-03

## 2022-08-03 RX ORDER — LIDOCAINE HYDROCHLORIDE 20 MG/ML
JELLY TOPICAL ONCE
Status: CANCELLED | OUTPATIENT
Start: 2022-08-03 | End: 2022-08-03

## 2022-08-03 RX ORDER — BACITRACIN ZINC AND POLYMYXIN B SULFATE 500; 1000 [USP'U]/G; [USP'U]/G
OINTMENT TOPICAL ONCE
Status: CANCELLED | OUTPATIENT
Start: 2022-08-03 | End: 2022-08-03

## 2022-08-03 RX ORDER — BACITRACIN, NEOMYCIN, POLYMYXIN B 400; 3.5; 5 [USP'U]/G; MG/G; [USP'U]/G
OINTMENT TOPICAL ONCE
Status: CANCELLED | OUTPATIENT
Start: 2022-08-03 | End: 2022-08-03

## 2022-08-03 RX ORDER — LIDOCAINE 40 MG/G
CREAM TOPICAL ONCE
Status: CANCELLED | OUTPATIENT
Start: 2022-08-03 | End: 2022-08-03

## 2022-08-03 RX ORDER — LIDOCAINE HYDROCHLORIDE 40 MG/ML
SOLUTION TOPICAL ONCE
Status: CANCELLED | OUTPATIENT
Start: 2022-08-03 | End: 2022-08-03

## 2022-08-03 RX ORDER — GENTAMICIN SULFATE 1 MG/G
OINTMENT TOPICAL ONCE
Status: CANCELLED | OUTPATIENT
Start: 2022-08-03 | End: 2022-08-03

## 2022-08-03 RX ORDER — GINSENG 100 MG
CAPSULE ORAL ONCE
Status: CANCELLED | OUTPATIENT
Start: 2022-08-03 | End: 2022-08-03

## 2022-08-03 RX ADMIN — LIDOCAINE HYDROCHLORIDE 6 ML: 20 JELLY TOPICAL at 08:43

## 2022-08-03 ASSESSMENT — ENCOUNTER SYMPTOMS
VOMITING: 0
DIARRHEA: 0
NAUSEA: 0

## 2022-08-03 NOTE — PROGRESS NOTES
[address]   Progress Note and Procedure Note      Wilder Ervin  MEDICAL RECORD NUMBER:  [id number]  AGE: 81 y.o. GENDER: female  : 1950  EPISODE DATE:  8/3/2022    Subjective:     Chief Complaint   Patient presents with    Wound Check     Right foot         HISTORY of PRESENT ILLNESS HPI     Wilder Ervin is a 81 y.o. female who presents today for wound/ulcer evaluation. Current evaluation:   Neville Licea has had her CROW adjusted. PVR/PPG was WNL. No sign of infection. Interval history:  XR was negative for sign of osteomyelitis. Neville Licea has scheduled the noninvasive vascular testing for . States she has an appointment tomorrow for [facility]. States that she is not able to use a knee scooter, crutches, or a walker to maintain non-weightbearing to the right lower extremity. History of Wound Context: Neville Licea presents for evaluation of plantar right foot ulceration. She states that it has been open for about 2 months now. She was seeing a podiatrist for this and was referred to the wound care clinic. She presents in a Crow on the right and a custom diabetic shoe on the left. She does have a history of Charcot. Has not had any sign or symptom of infection.     Ulcer Identification:  Ulcer Type: diabetic and neuropathic  Contributing Factors: edema, lymphedema, diabetes, chronic pressure and shear force          PAST MEDICAL HISTORY        Diagnosis Date    Diabetic polyneuropathy associated with type 1 diabetes mellitus ([address] 3/27/2019    Diabetic ulcer of left foot associated with diabetes mellitus due to underlying condition, with fat layer exposed ([address] 3/27/2019    Type 2 diabetes mellitus with hyperglycemia, with long-term current use of insulin ([address] 2018    Type II or unspecified type diabetes mellitus without mention of complication, not stated as uncontrolled        PAST SURGICAL HISTORY    Past Surgical History:   Procedure Laterality Date    FOOT SURGERY  +10years    R foot        FAMILY HISTORY    Family History   Problem Relation Age of Onset    Diabetes Mother     Diabetes Brother        SOCIAL HISTORY    Social History     Tobacco Use    Smoking status: Never    Smokeless tobacco: Never   Vaping Use    Vaping Use: Never used   Substance Use Topics    Alcohol use: No    Drug use: No       ALLERGIES    No Known Allergies    MEDICATIONS    Current Outpatient Medications on File Prior to Encounter   Medication Sig Dispense Refill    glucose monitoring (FREESTYLE FREEDOM) kit 1 kit by Does not apply route daily 1 kit 0    blood glucose monitor strips Test 4 times a day & as needed for symptoms of irregular blood glucose. Dispense sufficient amount for indicated testing frequency plus additional to accommodate PRN testing needs.  300 strip 0    Lancets MISC 1 each by Does not apply route 4 times daily 100 each 5    TOUJEO SOLOSTAR 300 UNIT/ML SOPN INJECT 50 UNITS UNDER THE SKIN AT BEDTIME 5 pen 5    atorvastatin (LIPITOR) 10 MG tablet TAKE 1 TABLET EVERY DAY 90 tablet 3    metoclopramide (REGLAN) 5 MG tablet TAKE 1 TABLET EVERY DAY (Patient not taking: Reported on 5/27/2022) 90 tablet 1    furosemide (LASIX) 40 MG tablet TAKE 1 TABLET EVERY DAY 90 tablet 1    alendronate (FOSAMAX) 70 MG tablet TAKE 1 TABLET EVERY 7 DAYS 12 tablet 3    Alcohol Swabs (B-D SINGLE USE SWABS REGULAR) PADS USE THREE TIMES DAILY 100 each 11    Accu-Chek Softclix Lancets MISC TEST THREE TIMES DAILY (Patient not taking: Reported on 4/5/2022) 300 each 3    DROPLET PEN NEEDLES 32G X 4 MM MISC USE TO INJECT SUBCUTANEOUSLY EVERY  each 3    blood glucose test strips (ACCU-CHEK MAMI PLUS) strip TEST THREE TIMES DAILY AS NEEDED (Patient not taking: Reported on 4/5/2022) 300 strip 3    potassium chloride (KLOR-CON M) 20 MEQ extended release tablet TAKE 1 TABLET EVERY DAY 90 tablet 1    levothyroxine (SYNTHROID) 50 MCG tablet Take 1 tablet by mouth daily 30 tablet 2    FERREX 150 FORTE 150-1-25 MG-MG-MCG CAPS capsule TAKE 1 CAPSULE BY MOUTH TWICE DAILY 30 capsule 11    acetaZOLAMIDE (DIAMOX) 500 MG extended release capsule       TRAVATAN Z 0.004 % SOLN ophthalmic solution       brimonidine (ALPHAGAN) 0.2 % ophthalmic solution Place 1 drop into both eyes 2 times daily 1 Bottle 0    aspirin (ASPIRIN LOW DOSE) 81 MG EC tablet Take 1 tablet by mouth daily 30 tablet 12    dorzolamide-timolol (COSOPT) 22.3-6.8 MG/ML ophthalmic solution        No current facility-administered medications on file prior to encounter. REVIEW OF SYSTEMS    Review of Systems   Constitutional:  Negative for chills and fever. Gastrointestinal:  Negative for diarrhea, nausea and vomiting. Skin:  Positive for wound. Objective:      BP (!) 119/58   Pulse 71   Temp 97.2 °F (36.2 °C) (Tympanic)   Resp 16   Ht 5' 9\" (1.753 m)   Wt 169 lb (76.7 kg)   BMI 24.96 kg/m²     Wt Readings from Last 3 Encounters:   08/03/22 169 lb (76.7 kg)   07/27/22 169 lb (76.7 kg)   07/13/22 169 lb (76.7 kg)       Physical Exam:  General:  Alert and oriented x3. In no acute distress. Lower Extremity Physical Exam:    Vascular: DP pulse on the left is palpable and not palpable on the right. PT pulse on the right is palpable and not palpable on the left. CFT <3 seconds to all digits, Bilateral.  Pitting edema, Bilateral.  Hair growth is absent to the level of the lower leg, Bilateral.     Neuro: Saph/sural/SP/DP/plantar sensation absent to light touch. Protective sensation is intact to  0 /10 sites as tested with a 5.07g SWMF, Bilateral.     Musculoskeletal: EHL/FHL/GS/TA gross motor intact. Gross deformity is present, rocker bottom deformity right foot with prominence at the site of ulceration. Dermatologic: Open wound present to plantar right midfoot as documented in detail below. Wound base is fibrotic with slough. Leda-wound skin is hyperkeratotic. Negative probe to bone.   There is no erythema. There is no purulent drainage. There is no fluctuance or crepitus. Interdigital maceration absent, Bilateral.       Assessment:      Active Hospital Problems    Diagnosis Date Noted    Right foot ulcer, with fat layer exposed [facility]) [L97.512] 06/29/2022     Priority: Medium    Charcot's joint of foot, right [M14.671] 06/29/2022     Priority: Medium    Diabetic polyneuropathy associated with type 2 diabetes mellitus ([facility] [address] [E11.42] 03/27/2019       Plan:     Treatment Note please see attached Discharge Instructions    PVR/PPG reviewed and discussed with patient, WNL    Discussed the importance of offloading for healing of a plantar neuropathic ulceration. Discussed NWB vs total contact cast.    She wishes to hold off on TCC at this time now that the boot was adjusted. Recommended that she limit ambulation as much as possible in the meantime. [place] to the wound base daily    Educated on signs and symptoms of infection. Instructed to call clinic immediately or go to ER if signs and symptoms of infection are present. RTC 1 week       Procedure Note  Indications:  Based on my examination of this patient's wound(s)/ulcer(s) today, debridement is required to promote healing and evaluate the wound base. Performed by: Margy Soulier, DPM    Consent obtained:  Yes    Time out taken:  Yes    Pain Control: Anesthetic  Anesthetic: 2% Lidocaine Gel Topical       Debridement: Excisional Debridement    Using curette and tissue nippers the wound(s)/ulcer(s) was/were sharply debrided down through and including the removal of subcutaneous tissue.         Devitalized Tissue Debrided:  fibrin, biofilm, slough and callus    Pre Debridement Measurements:  Are located in the [place]  Documentation Flow Sheet    Wound/Ulcer #: 1    Post Debridement Measurements:  Wound/Ulcer Descriptions are Pre Debridement except measurements:    Wound 06/29/22 Foot Right;Plantar #1 (Active)   Wound Image   08/03/22 5104 Wound Etiology Diabetic [place] 2 08/03/22 3008   Dressing Status New drainage noted; Old drainage noted 08/03/22 0834   Wound Cleansed Cleansed with saline 08/03/22 0834   Offloading for Diabetic Foot Ulcers Offloading ordered; Offloading boot 07/27/22 0855   Wound Length (cm) 1.5 cm 08/03/22 0834   Wound Width (cm) 1.2 cm 08/03/22 0834   Wound Depth (cm) 0.4 cm 08/03/22 0834   Wound Surface Area (cm^2) 1.8 cm^2 08/03/22 0834   Change in Wound Size % (l*w) 40.79 08/03/22 0834   Wound Volume (cm^3) 0.72 cm^3 08/03/22 0834   Wound Healing % 61 08/03/22 0834   Post-Procedure Length (cm) 1.5 cm 08/03/22 0834   Post-Procedure Width (cm) 1.2 cm 08/03/22 0834   Post-Procedure Depth (cm) 0.4 cm 08/03/22 0834   Post-Procedure Surface Area (cm^2) 1.8 cm^2 08/03/22 0834   Post-Procedure Volume (cm^3) 0.72 cm^3 08/03/22 0834   Wound Assessment [place]/red;Slough 08/03/22 0834   Drainage Amount Moderate 08/03/22 0834   Drainage Description Yellow 08/03/22 0834   Odor Mild 08/03/22 0834   Leda-wound Assessment Maceration; Hyperkeratosis (callous) 08/03/22 0834   Margins Defined edges 08/03/22 0834   Wound Thickness Description not for Pressure Injury Full thickness 08/03/22 0834   Number of days: 35          Percent of Wound(s)/Ulcer(s) Debrided: 100%    Total Surface Area Debrided:  1.8 sq cm     Diabetic/Pressure/Non Pressure Ulcers only:  Ulcer: Diabetic ulcer, fat layer exposed     Estimated Blood Loss:  Estimated amount of blood loss is 2ml. Hemostasis Achieved:  by pressure    Response to treatment:  Well tolerated by patient. Written patient discharge instructions given to patient and signed by patient or POA.       Orders Placed This Encounter   Medications    lidocaine (XYLOCAINE) 2 % uro-jet     Orders Placed This Encounter   Procedures    Initiate Outpatient Wound Care Protocol     Cleanse wound with saline    If wound contains bioburden or contamination cleanse with wound cleanser or antimicrobial solution     For normal periwound tissue without irritation nor maceration, apply topical skin protectant    For periwound tissue with irritation and/or maceration, apply zinc based product, topical steroid cream/ointment, or equivalent     For wounds with dry firm black eschar and/or without exudate, apply betadine and leave open to air      For wounds with scant/small to no exudate or drainage, apply wound gel, hydrocolloid, polymer, or equivalent and cover with secondary dressing/foam      For wounds with moderate/large exudate or drainage, apply alginate, hydrofiber, polymer, or equivalent and cover with secondary dressing/foam    For wounds with nonviable tissue requiring removal, apply chemical or mechanical debrider and cover with secondary dressing/foam    For wounds with tunneling, dead space, or cavity, fill or pack with strip/gauze/kerlex to fit and cover with secondary dressing/foam    For wounds with adequate granulation or epithelization, apply wound gel, hydrocolloid, polymer, collagen, or transparent film, and cover secondary dry dressing/foam    For wounds that need additional secondary dressing to help pad or control additional drainage/exudates, add foam, absorbent pad or hydrocolloid    For wounds with suspected or known infection, apply antimicrobial mesh and/or antimicrobial alginate/hydrofiber, or antimicrobial solution moistened gauze/kerlex, or equivalent and cover with secondary dressing/foam    Compression Management needed for edema control, apply multilayer compression or tubular garment or equivalent    Offloading Management needed for pressure relief, apply offloading shoe/boot or equivalent     Standing Status:   Standing     Number of Occurrences:   1          Discharge Instructions            [address] [facility] -Phone: [phone] Fax: [phone]    Visit  Discharge Instructions / Physician Orders     DATE: 8/3/2022     Home Care:     SUPPLIES ORDERED THRU: [address] on 8/3/2022 at 8:54 AM

## 2022-08-17 ENCOUNTER — HOSPITAL ENCOUNTER (OUTPATIENT)
Dept: WOUND CARE | Age: 72
Discharge: HOME | End: 2022-08-17
Payer: MEDICARE

## 2022-08-17 VITALS
HEART RATE: 63 BPM | WEIGHT: 169 LBS | BODY MASS INDEX: 25.03 KG/M2 | SYSTOLIC BLOOD PRESSURE: 135 MMHG | TEMPERATURE: 97.2 F | RESPIRATION RATE: 16 BRPM | HEIGHT: 69 IN | DIASTOLIC BLOOD PRESSURE: 72 MMHG

## 2022-08-17 DIAGNOSIS — L97.512 RIGHT FOOT ULCER, WITH FAT LAYER EXPOSED (HCC): Primary | ICD-10-CM

## 2022-08-17 PROCEDURE — 11042 DBRDMT SUBQ TIS 1ST 20SQCM/<: CPT

## 2022-08-17 RX ORDER — LIDOCAINE 50 MG/G
OINTMENT TOPICAL ONCE
Status: CANCELLED | OUTPATIENT
Start: 2022-08-17 | End: 2022-08-17

## 2022-08-17 RX ORDER — BACITRACIN ZINC AND POLYMYXIN B SULFATE 500; 1000 [USP'U]/G; [USP'U]/G
OINTMENT TOPICAL ONCE
Status: CANCELLED | OUTPATIENT
Start: 2022-08-17 | End: 2022-08-17

## 2022-08-17 RX ORDER — LIDOCAINE HYDROCHLORIDE 20 MG/ML
JELLY TOPICAL ONCE
Status: COMPLETED | OUTPATIENT
Start: 2022-08-17 | End: 2022-08-17

## 2022-08-17 RX ORDER — GENTAMICIN SULFATE 1 MG/G
OINTMENT TOPICAL ONCE
Status: CANCELLED | OUTPATIENT
Start: 2022-08-17 | End: 2022-08-17

## 2022-08-17 RX ORDER — CLOBETASOL PROPIONATE 0.5 MG/G
OINTMENT TOPICAL ONCE
Status: CANCELLED | OUTPATIENT
Start: 2022-08-17 | End: 2022-08-17

## 2022-08-17 RX ORDER — LIDOCAINE HYDROCHLORIDE 40 MG/ML
SOLUTION TOPICAL ONCE
Status: CANCELLED | OUTPATIENT
Start: 2022-08-17 | End: 2022-08-17

## 2022-08-17 RX ORDER — BACITRACIN, NEOMYCIN, POLYMYXIN B 400; 3.5; 5 [USP'U]/G; MG/G; [USP'U]/G
OINTMENT TOPICAL ONCE
Status: CANCELLED | OUTPATIENT
Start: 2022-08-17 | End: 2022-08-17

## 2022-08-17 RX ORDER — BETAMETHASONE DIPROPIONATE 0.05 %
OINTMENT (GRAM) TOPICAL ONCE
Status: CANCELLED | OUTPATIENT
Start: 2022-08-17 | End: 2022-08-17

## 2022-08-17 RX ORDER — LIDOCAINE 40 MG/G
CREAM TOPICAL ONCE
Status: CANCELLED | OUTPATIENT
Start: 2022-08-17 | End: 2022-08-17

## 2022-08-17 RX ORDER — LIDOCAINE HYDROCHLORIDE 20 MG/ML
JELLY TOPICAL ONCE
Status: CANCELLED | OUTPATIENT
Start: 2022-08-17 | End: 2022-08-17

## 2022-08-17 RX ORDER — GINSENG 100 MG
CAPSULE ORAL ONCE
Status: CANCELLED | OUTPATIENT
Start: 2022-08-17 | End: 2022-08-17

## 2022-08-17 RX ADMIN — LIDOCAINE HYDROCHLORIDE 6 ML: 20 JELLY TOPICAL at 08:29

## 2022-08-17 ASSESSMENT — ENCOUNTER SYMPTOMS
VOMITING: 0
DIARRHEA: 0
NAUSEA: 0

## 2022-08-17 NOTE — PROGRESS NOTES
[address]   Progress Note and Procedure Note      Camron George  MEDICAL RECORD NUMBER:  [id number]  AGE: 81 y.o. GENDER: female  : 1950  EPISODE DATE:  2022    Subjective:     Chief Complaint   Patient presents with    Wound Check     Right foot         HISTORY of PRESENT ILLNESS HPI     Camron George is a 81 y.o. female who presents today for wound/ulcer evaluation. Current evaluation:   Magdiel Hernandez has had her CROW adjusted and reports appropriate use. PVR/PPG was WNL. No sign of infection. Interval history:  XR was negative for sign of osteomyelitis. Magdiel Hernandez has scheduled the noninvasive vascular testing for . States she has an appointment tomorrow for [facility]. States that she is not able to use a knee scooter, crutches, or a walker to maintain non-weightbearing to the right lower extremity. History of Wound Context: Magdiel Hernandez presents for evaluation of plantar right foot ulceration. She states that it has been open for about 2 months now. She was seeing a podiatrist for this and was referred to the wound care clinic. She presents in a Crow on the right and a custom diabetic shoe on the left. She does have a history of Charcot. Has not had any sign or symptom of infection.     Ulcer Identification:  Ulcer Type: diabetic and neuropathic  Contributing Factors: edema, lymphedema, diabetes, chronic pressure and shear force          PAST MEDICAL HISTORY        Diagnosis Date    Diabetic polyneuropathy associated with type 1 diabetes mellitus ([address] 3/27/2019    Diabetic ulcer of left foot associated with diabetes mellitus due to underlying condition, with fat layer exposed ([address] 3/27/2019    Type 2 diabetes mellitus with hyperglycemia, with long-term current use of insulin ([address] 2018    Type II or unspecified type diabetes mellitus without mention of complication, not stated as uncontrolled        PAST SURGICAL HISTORY    Past Surgical History:   Procedure Laterality Date    FOOT SURGERY  +10years    R foot        FAMILY HISTORY    Family History   Problem Relation Age of Onset    Diabetes Mother     Diabetes Brother        SOCIAL HISTORY    Social History     Tobacco Use    Smoking status: Never    Smokeless tobacco: Never   Vaping Use    Vaping Use: Never used   Substance Use Topics    Alcohol use: No    Drug use: No       ALLERGIES    No Known Allergies    MEDICATIONS    Current Outpatient Medications on File Prior to Encounter   Medication Sig Dispense Refill    glucose monitoring (FREESTYLE FREEDOM) kit 1 kit by Does not apply route daily 1 kit 0    blood glucose monitor strips Test 4 times a day & as needed for symptoms of irregular blood glucose. Dispense sufficient amount for indicated testing frequency plus additional to accommodate PRN testing needs.  300 strip 0    Lancets MISC 1 each by Does not apply route 4 times daily 100 each 5    TOUJEO SOLOSTAR 300 UNIT/ML SOPN INJECT 50 UNITS UNDER THE SKIN AT BEDTIME 5 pen 5    atorvastatin (LIPITOR) 10 MG tablet TAKE 1 TABLET EVERY DAY 90 tablet 3    metoclopramide (REGLAN) 5 MG tablet TAKE 1 TABLET EVERY DAY (Patient not taking: Reported on 5/27/2022) 90 tablet 1    furosemide (LASIX) 40 MG tablet TAKE 1 TABLET EVERY DAY 90 tablet 1    alendronate (FOSAMAX) 70 MG tablet TAKE 1 TABLET EVERY 7 DAYS 12 tablet 3    Alcohol Swabs (B-D SINGLE USE SWABS REGULAR) PADS USE THREE TIMES DAILY 100 each 11    Accu-Chek Softclix Lancets MISC TEST THREE TIMES DAILY (Patient not taking: Reported on 4/5/2022) 300 each 3    DROPLET PEN NEEDLES 32G X 4 MM MISC USE TO INJECT SUBCUTANEOUSLY EVERY  each 3    blood glucose test strips (ACCU-CHEK MAMI PLUS) strip TEST THREE TIMES DAILY AS NEEDED (Patient not taking: Reported on 4/5/2022) 300 strip 3    potassium chloride (KLOR-CON M) 20 MEQ extended release tablet TAKE 1 TABLET EVERY DAY 90 tablet 1    levothyroxine (SYNTHROID) 50 MCG tablet Take 1 tablet by mouth daily 30 tablet 2    FERREX 150 FORTE 150-1-25 MG-MG-MCG CAPS capsule TAKE 1 CAPSULE BY MOUTH TWICE DAILY 30 capsule 11    acetaZOLAMIDE (DIAMOX) 500 MG extended release capsule       TRAVATAN Z 0.004 % SOLN ophthalmic solution       brimonidine (ALPHAGAN) 0.2 % ophthalmic solution Place 1 drop into both eyes 2 times daily 1 Bottle 0    aspirin (ASPIRIN LOW DOSE) 81 MG EC tablet Take 1 tablet by mouth daily 30 tablet 12    dorzolamide-timolol (COSOPT) 22.3-6.8 MG/ML ophthalmic solution        No current facility-administered medications on file prior to encounter. REVIEW OF SYSTEMS    Review of Systems   Constitutional:  Negative for chills and fever. Gastrointestinal:  Negative for diarrhea, nausea and vomiting. Skin:  Positive for wound. Objective:      /72   Pulse 63   Temp 97.2 °F (36.2 °C) (Tympanic)   Resp 16   Ht 5' 9\" (1.753 m)   Wt 169 lb (76.7 kg)   BMI 24.96 kg/m²     Wt Readings from Last 3 Encounters:   08/17/22 169 lb (76.7 kg)   08/03/22 169 lb (76.7 kg)   07/27/22 169 lb (76.7 kg)       Physical Exam:  General:  Alert and oriented x3. In no acute distress. Lower Extremity Physical Exam:    Vascular: DP pulse on the left is palpable and not palpable on the right. PT pulse on the right is palpable and not palpable on the left. CFT <3 seconds to all digits, Bilateral.  Pitting edema, Bilateral.  Hair growth is absent to the level of the lower leg, Bilateral.     Neuro: Saph/sural/SP/DP/plantar sensation absent to light touch. Protective sensation is intact to  0 /10 sites as tested with a 5.07g SWMF, Bilateral.     Musculoskeletal: EHL/FHL/GS/TA gross motor intact. Gross deformity is present, rocker bottom deformity right foot with prominence at the site of ulceration. Dermatologic: Open wound present to plantar right midfoot as documented in detail below. Wound base is fibrotic with slough.   Leda-wound skin is hyperkeratotic with rolled edges.  Negative probe to bone. There is no erythema. There is no purulent drainage. There is no fluctuance or crepitus. Interdigital maceration absent, Bilateral.       Assessment:      Active Hospital Problems    Diagnosis Date Noted    Right foot ulcer, with fat layer exposed [facility]) [L97.512] 06/29/2022     Priority: Medium    Charcot's joint of foot, right [M14.671] 06/29/2022     Priority: Medium    Diabetic polyneuropathy associated with type 2 diabetes mellitus ([facility] [E11.42] 03/27/2019       Plan:     Treatment Note please see attached Discharge Instructions    PVR/PPG reviewed and discussed with patient, WNL    Discussed the importance of offloading for healing of a plantar neuropathic ulceration. Discussed NWB vs total contact cast.    She wishes to hold off on TCC at this time and continue with CROW. Discussed that if there is no improvement over the next week, that she will need to consider offloading with NWB or the TCC. Recommended that she limit ambulation as much as possible in the meantime. Coty to the wound base daily    Educated on signs and symptoms of infection. Instructed to call clinic immediately or go to ER if signs and symptoms of infection are present. RTC 1 week       Procedure Note  Indications:  Based on my examination of this patient's wound(s)/ulcer(s) today, debridement is required to promote healing and evaluate the wound base. Performed by: Vanessa Riddle DPM    Consent obtained:  Yes    Time out taken:  Yes    Pain Control: Anesthetic  Anesthetic: 2% Lidocaine Gel Topical       Debridement: Excisional Debridement    Using curette and tissue nippers the wound(s)/ulcer(s) was/were sharply debrided down through and including the removal of subcutaneous tissue.         Devitalized Tissue Debrided:  fibrin, biofilm, slough and callus    Pre Debridement Measurements:  Are located in the [facility]  Documentation Flow Sheet    Wound/Ulcer #: 1    Post Debridement Measurements:  Wound/Ulcer Descriptions are Pre Debridement except measurements:    Wound 06/29/22 Foot Right;Plantar #1 (Active)   Wound Image   08/03/22 0834   Wound Etiology Diabetic [place] 2 08/17/22 0825   Dressing Status New drainage noted; Old drainage noted 08/17/22 0825   Wound Cleansed Cleansed with saline 08/17/22 0825   Offloading for Diabetic Foot Ulcers Offloading ordered; Offloading boot 08/17/22 0825   Wound Length (cm) 1.2 cm 08/17/22 0825   Wound Width (cm) 1.5 cm 08/17/22 0825   Wound Depth (cm) 0.3 cm 08/17/22 0825   Wound Surface Area (cm^2) 1.8 cm^2 08/17/22 0825   Change in Wound Size % (l*w) 40.79 08/17/22 0825   Wound Volume (cm^3) 0.54 cm^3 08/17/22 0825   Wound Healing % 70 08/17/22 0825   Post-Procedure Length (cm) 1.5 cm 08/17/22 0825   Post-Procedure Width (cm) 1.8 cm 08/17/22 0825   Post-Procedure Depth (cm) 0.4 cm 08/17/22 0825   Post-Procedure Surface Area (cm^2) 2.7 cm^2 08/17/22 0825   Post-Procedure Volume (cm^3) 1.08 cm^3 08/17/22 0825   Wound Assessment [place]/red;Slough 08/17/22 0825   Drainage Amount Moderate 08/17/22 0825   Drainage Description Yellow 08/17/22 0825   Odor Mild 08/17/22 0825   Leda-wound Assessment Maceration; Hyperkeratosis (callous) 08/17/22 0825   Margins Defined edges 08/17/22 0825   Wound Thickness Description not for Pressure Injury Full thickness 08/17/22 0825   Number of days: 49          Percent of Wound(s)/Ulcer(s) Debrided: 100%    Total Surface Area Debrided:  2.7 sq cm     Diabetic/Pressure/Non Pressure Ulcers only:  Ulcer: Diabetic ulcer, fat layer exposed     Estimated Blood Loss:  Estimated amount of blood loss is 2ml. Hemostasis Achieved:  by pressure    Response to treatment:  Well tolerated by patient. Written patient discharge instructions given to patient and signed by patient or POA.       Orders Placed This Encounter   Medications    lidocaine (XYLOCAINE) 2 % uro-jet     Orders Placed This Encounter   Procedures    Initiate Outpatient Wound Care Protocol     Cleanse wound with saline    If wound contains bioburden or contamination cleanse with wound cleanser or antimicrobial solution     For normal periwound tissue without irritation nor maceration, apply topical skin protectant    For periwound tissue with irritation and/or maceration, apply zinc based product, topical steroid cream/ointment, or equivalent     For wounds with dry firm black eschar and/or without exudate, apply betadine and leave open to air      For wounds with scant/small to no exudate or drainage, apply wound gel, hydrocolloid, polymer, or equivalent and cover with secondary dressing/foam      For wounds with moderate/large exudate or drainage, apply alginate, hydrofiber, polymer, or equivalent and cover with secondary dressing/foam    For wounds with nonviable tissue requiring removal, apply chemical or mechanical debrider and cover with secondary dressing/foam    For wounds with tunneling, dead space, or cavity, fill or pack with strip/gauze/kerlex to fit and cover with secondary dressing/foam    For wounds with adequate granulation or epithelization, apply wound gel, hydrocolloid, polymer, collagen, or transparent film, and cover secondary dry dressing/foam    For wounds that need additional secondary dressing to help pad or control additional drainage/exudates, add foam, absorbent pad or hydrocolloid    For wounds with suspected or known infection, apply antimicrobial mesh and/or antimicrobial alginate/hydrofiber, or antimicrobial solution moistened gauze/kerlex, or equivalent and cover with secondary dressing/foam    Compression Management needed for edema control, apply multilayer compression or tubular garment or equivalent    Offloading Management needed for pressure relief, apply offloading shoe/boot or equivalent     Standing Status:   Standing     Number of Occurrences:   1          Discharge Instructions            [address] [facility] -Phone: [phone] Fax: [phone]    Visit  Discharge Instructions / Physician Orders     DATE: 8/17/2022     Home Care:     SUPPLIES ORDERED THRU: [facility] within [facility]     Wound Location: Right Plantar Foot     Cleanse with: Liquid antibacterial soap and water, rinse well      Dressing Orders: Coty to wound, Silicone Border Dressing     Frequency: Daily     Additional Orders: Increase protein to diet (meat, cheese, eggs, fish, peanut butter, nuts and beans)  Multivitamin daily  ELEVATE LEGS AS MUCH AS POSSIBLE  6/29/22- X-Ray Ordered  6/29/22- Vascular Studies Ordered     [facility] 2 weeks with Dr. Yue Whittington     (Please note your next appointment above and if you are unable to keep, kindly give a 24 hour notice. Thank you.)  If more than 15 min late we cannot guarantee you will be seen due to clinician schedule  Per Policy, Excessive cancellation will call for dismissal from program.     If you experience any of the following, please call the [address] [place] [address] [facility] during business hours:  [phone]  Your Phone call may be forwarded to [facility] during business hours that [facility]ath[facility]el [facility] is closed. * Increase in Pain  * Temperature over 101  * Increase in drainage from your wound  * Drainage with a foul odor  * Bleeding  * Increase in swelling  * Need for compression bandage changes due to slippage, breakthrough drainage. If you need medical attention outside of the business hours of the [address] [place] [address] please contact your PCP or go to the nearest emergency room. The information contained in the After Visit Summary has been reviewed with me, the patient and/or responsible adult, by my health care provider(s). I had the opportunity to ask questions regarding this information.  I have elected to receive;      []After Visit Summary  [x]Comprehensive Discharge Instruction        Patient signature______________________________________Date:________  Electronically signed by Bayron Lockhart RN on 8/17/2022 at 8:36 AM  Electronically signed by Corinna Fregoso DPM on 8/17/2022 at 8:20 AM        Electronically signed by Corinna Fregoso DPM on 8/17/2022 at 8:41 AM

## 2022-08-23 NOTE — PROGRESS NOTES
[address]:     [employer] [id number] [address] p: [phone] f: [phone]     Ordering Center:     [facility]  Michelle [address]reras [address] [facility]  [phone]  WOUND CARE Dept: [address] [place] [address] Avenue NUMBER [phone]    Patient Information:      Gilford Lynn  [address] [facility] [address] [facility]   [phone]   : 1950  AGE: 81 y.o. GENDER: female   TODAYS DATE:  2022    Insurance:      PRIMARY INSURANCE:  Plan: [employer] HMO  Coverage: [employer] MEDICARE  Effective Date: 1/1/2015  [id number] - (Medicare Managed)  Group: [id number]    Patient Wound Information:    Diagnoses       Codes Comments   Right foot ulcer, with fat layer exposed ([facility]  - Primary L97.512        WOUNDS REQUIRING DRESSING SUPPLIES:     Wound 22 Foot Right;Plantar #1 (Active)   Wound Image   22 0834   Wound Etiology Diabetic [place] 2 22 0825   Dressing Status New drainage noted; Old drainage noted 22 0825   Wound Cleansed Cleansed with saline 22 0825   Offloading for Diabetic Foot Ulcers Offloading ordered; Offloading boot 22 0842   Wound Length (cm) 1.2 cm 22 0825   Wound Width (cm) 1.5 cm 22 0825   Wound Depth (cm) 0.3 cm 22 0825   Wound Surface Area (cm^2) 1.8 cm^2 22 0825   Change in Wound Size % (l*w) 40.79 22 0825   Wound Volume (cm^3) 0.54 cm^3 22 0825   Wound Healing % 70 22 0825   Post-Procedure Length (cm) 1.5 cm 22 0825   Post-Procedure Width (cm) 1.8 cm 22 0825   Post-Procedure Depth (cm) 0.4 cm 22 0825   Post-Procedure Surface Area (cm^2) 2.7 cm^2 22   Post-Procedure Volume (cm^3) 1.08 cm^3 22   Wound Assessment [place]/red;Slough 22   Drainage Amount Moderate 22   Drainage Description Yellow 22   Odor Mild 22   Leda-wound Assessment Maceration; Hyperkeratosis (callous) 08/17/22 0825   Margins Defined edges 08/17/22 0825   Wound Thickness Description not for Pressure Injury Full thickness 08/17/22 0825   Number of days: 54          Supplies Requested :      WOUND #: 1   PRIMARY DRESSING:  Collagen with silver-Coty   Cover and Secure with:  Other Excel SAP 4X4     FREQUENCY OF DRESSING CHANGES:  Daily     ADDITIONAL ITEMS:  [] Gloves Small  [x] Gloves Medium [] Gloves Large [] Gloves Aviva Delon  [] Tape 1\" [] Tape 2\" [] Tape 3\"  [] Medipore Tape  [x] Saline  [] Skin Prep   [] Adhesive Remover   [] Cotton Tip Applicators   [] Other:    Patient Wound(s) Debrided: [x] Yes   [] No    Debribement Type: subcutaneous tissue    Debridement Date: 8/17/2022    Patient currently being seen by Home Health: [] Yes   [x] No    Duration for needed supplies:  []15  [x]30  []60  []90 Days    Provider Information:      Providers Name: Dr. Toney Landrum DPM   NPI: [id number]

## 2022-08-29 NOTE — DISCHARGE INSTRUCTIONS
signature______________________________________Date:________  Electronically signed by Rosary Eisenmenger, RN on 8/31/2022 at 8:44 AM  Electronically signed by Britney Grijalva DPM on 8/31/2022 at 8:25 AM

## 2022-08-31 ENCOUNTER — HOSPITAL ENCOUNTER (OUTPATIENT)
Dept: WOUND CARE | Age: 72
Discharge: HOME | End: 2022-08-31
Payer: MEDICARE

## 2022-08-31 VITALS
HEART RATE: 62 BPM | HEIGHT: 69 IN | TEMPERATURE: 97 F | BODY MASS INDEX: 25.03 KG/M2 | DIASTOLIC BLOOD PRESSURE: 70 MMHG | SYSTOLIC BLOOD PRESSURE: 130 MMHG | WEIGHT: 169 LBS

## 2022-08-31 DIAGNOSIS — L97.512 RIGHT FOOT ULCER, WITH FAT LAYER EXPOSED (HCC): Primary | ICD-10-CM

## 2022-08-31 PROCEDURE — 11042 DBRDMT SUBQ TIS 1ST 20SQCM/<: CPT

## 2022-08-31 RX ORDER — LIDOCAINE HYDROCHLORIDE 20 MG/ML
JELLY TOPICAL ONCE
Status: CANCELLED | OUTPATIENT
Start: 2022-08-31 | End: 2022-08-31

## 2022-08-31 RX ORDER — LIDOCAINE HYDROCHLORIDE 40 MG/ML
SOLUTION TOPICAL ONCE
Status: CANCELLED | OUTPATIENT
Start: 2022-08-31 | End: 2022-08-31

## 2022-08-31 RX ORDER — GENTAMICIN SULFATE 1 MG/G
OINTMENT TOPICAL ONCE
Status: CANCELLED | OUTPATIENT
Start: 2022-08-31 | End: 2022-08-31

## 2022-08-31 RX ORDER — BACITRACIN ZINC AND POLYMYXIN B SULFATE 500; 1000 [USP'U]/G; [USP'U]/G
OINTMENT TOPICAL ONCE
Status: CANCELLED | OUTPATIENT
Start: 2022-08-31 | End: 2022-08-31

## 2022-08-31 RX ORDER — BACITRACIN, NEOMYCIN, POLYMYXIN B 400; 3.5; 5 [USP'U]/G; MG/G; [USP'U]/G
OINTMENT TOPICAL ONCE
Status: CANCELLED | OUTPATIENT
Start: 2022-08-31 | End: 2022-08-31

## 2022-08-31 RX ORDER — LIDOCAINE HYDROCHLORIDE 20 MG/ML
JELLY TOPICAL ONCE
Status: COMPLETED | OUTPATIENT
Start: 2022-08-31 | End: 2022-08-31

## 2022-08-31 RX ORDER — CLOBETASOL PROPIONATE 0.5 MG/G
OINTMENT TOPICAL ONCE
Status: CANCELLED | OUTPATIENT
Start: 2022-08-31 | End: 2022-08-31

## 2022-08-31 RX ORDER — BETAMETHASONE DIPROPIONATE 0.05 %
OINTMENT (GRAM) TOPICAL ONCE
Status: CANCELLED | OUTPATIENT
Start: 2022-08-31 | End: 2022-08-31

## 2022-08-31 RX ORDER — LIDOCAINE 40 MG/G
CREAM TOPICAL ONCE
Status: CANCELLED | OUTPATIENT
Start: 2022-08-31 | End: 2022-08-31

## 2022-08-31 RX ORDER — GINSENG 100 MG
CAPSULE ORAL ONCE
Status: CANCELLED | OUTPATIENT
Start: 2022-08-31 | End: 2022-08-31

## 2022-08-31 RX ORDER — LIDOCAINE 50 MG/G
OINTMENT TOPICAL ONCE
Status: CANCELLED | OUTPATIENT
Start: 2022-08-31 | End: 2022-08-31

## 2022-08-31 RX ADMIN — LIDOCAINE HYDROCHLORIDE 6 ML: 20 JELLY TOPICAL at 08:33

## 2022-08-31 ASSESSMENT — ENCOUNTER SYMPTOMS
NAUSEA: 0
VOMITING: 0
DIARRHEA: 0

## 2022-08-31 NOTE — PROGRESS NOTES
[address]   Progress Note and Procedure Note      Ahmet Rubio  MEDICAL RECORD NUMBER:  [id number]  AGE: 81 y.o. GENDER: female  : 1950  EPISODE DATE:  2022    Subjective:     Chief Complaint   Patient presents with    Wound Check     Rle           HISTORY of PRESENT ILLNESS HPI     Ahmet Rubio is a 81 y.o. female who presents today for wound/ulcer evaluation. Current evaluation:   Rxo Richey presents in her [address]. No sign of infection. Does not want to use the total contact cast.  States she will be going to a wedding soon and won't be wearing the [address]. Interval history:  XR was negative for sign of osteomyelitis. Rox Richey has scheduled the noninvasive vascular testing for . States she has an appointment tomorrow for [facility]. States that she is not able to use a knee scooter, crutches, or a walker to maintain non-weightbearing to the right lower extremity. History of Wound Context: Rox Richey presents for evaluation of plantar right foot ulceration. She states that it has been open for about 2 months now. She was seeing a podiatrist for this and was referred to the wound care clinic. She presents in a Crow on the right and a custom diabetic shoe on the left. She does have a history of Charcot. Has not had any sign or symptom of infection.     Ulcer Identification:  Ulcer Type: diabetic and neuropathic  Contributing Factors: edema, lymphedema, diabetes, chronic pressure and shear force          PAST MEDICAL HISTORY        Diagnosis Date    Diabetic polyneuropathy associated with type 1 diabetes mellitus ([address] 3/27/2019    Diabetic ulcer of left foot associated with diabetes mellitus due to underlying condition, with fat layer exposed ([address] 3/27/2019    Type 2 diabetes mellitus with hyperglycemia, with long-term current use of insulin ([address] 2018    Type II or unspecified type diabetes mellitus without mention of complication, not stated as uncontrolled        PAST SURGICAL HISTORY    Past Surgical History:   Procedure Laterality Date    FOOT SURGERY  +10years    R foot        FAMILY HISTORY    Family History   Problem Relation Age of Onset    Diabetes Mother     Diabetes Brother        SOCIAL HISTORY    Social History     Tobacco Use    Smoking status: Never    Smokeless tobacco: Never   Vaping Use    Vaping Use: Never used   Substance Use Topics    Alcohol use: No    Drug use: No       ALLERGIES    No Known Allergies    MEDICATIONS    Current Outpatient Medications on File Prior to Encounter   Medication Sig Dispense Refill    glucose monitoring (FREESTYLE FREEDOM) kit 1 kit by Does not apply route daily 1 kit 0    blood glucose monitor strips Test 4 times a day & as needed for symptoms of irregular blood glucose. Dispense sufficient amount for indicated testing frequency plus additional to accommodate PRN testing needs.  300 strip 0    Lancets MISC 1 each by Does not apply route 4 times daily 100 each 5    TOUJEO SOLOSTAR 300 UNIT/ML SOPN INJECT 50 UNITS UNDER THE SKIN AT BEDTIME 5 pen 5    atorvastatin (LIPITOR) 10 MG tablet TAKE 1 TABLET EVERY DAY 90 tablet 3    metoclopramide (REGLAN) 5 MG tablet TAKE 1 TABLET EVERY DAY (Patient not taking: Reported on 5/27/2022) 90 tablet 1    furosemide (LASIX) 40 MG tablet TAKE 1 TABLET EVERY DAY 90 tablet 1    alendronate (FOSAMAX) 70 MG tablet TAKE 1 TABLET EVERY 7 DAYS 12 tablet 3    Alcohol Swabs (B-D SINGLE USE SWABS REGULAR) PADS USE THREE TIMES DAILY 100 each 11    Accu-Chek Softclix Lancets MISC TEST THREE TIMES DAILY (Patient not taking: Reported on 4/5/2022) 300 each 3    DROPLET PEN NEEDLES 32G X 4 MM MISC USE TO INJECT SUBCUTANEOUSLY EVERY  each 3    blood glucose test strips (ACCU-CHEK MAMI PLUS) strip TEST THREE TIMES DAILY AS NEEDED (Patient not taking: Reported on 4/5/2022) 300 strip 3    potassium chloride (KLOR-CON M) 20 MEQ extended release tablet TAKE 1 TABLET EVERY DAY 90 tablet 1    levothyroxine (SYNTHROID) 50 MCG tablet Take 1 tablet by mouth daily 30 tablet 2    FERREX 150 FORTE 150-1-25 MG-MG-MCG CAPS capsule TAKE 1 CAPSULE BY MOUTH TWICE DAILY 30 capsule 11    acetaZOLAMIDE (DIAMOX) 500 MG extended release capsule       TRAVATAN Z 0.004 % SOLN ophthalmic solution       brimonidine (ALPHAGAN) 0.2 % ophthalmic solution Place 1 drop into both eyes 2 times daily 1 Bottle 0    aspirin (ASPIRIN LOW DOSE) 81 MG EC tablet Take 1 tablet by mouth daily 30 tablet 12    dorzolamide-timolol (COSOPT) 22.3-6.8 MG/ML ophthalmic solution        No current facility-administered medications on file prior to encounter. REVIEW OF SYSTEMS    Review of Systems   Constitutional:  Negative for chills and fever. Gastrointestinal:  Negative for diarrhea, nausea and vomiting. Skin:  Positive for wound. Objective:      /70   Pulse 62   Temp 97 °F (36.1 °C) (Tympanic)   Ht 5' 9\" (1.753 m)   Wt 169 lb (76.7 kg)   BMI 24.96 kg/m²     Wt Readings from Last 3 Encounters:   08/31/22 169 lb (76.7 kg)   08/17/22 169 lb (76.7 kg)   08/03/22 169 lb (76.7 kg)       Physical Exam:  General:  Alert and oriented x3. In no acute distress. Lower Extremity Physical Exam:    Vascular: DP pulse on the left is palpable and not palpable on the right. PT pulse on the right is palpable and not palpable on the left. CFT <3 seconds to all digits, Bilateral.  Pitting edema, Bilateral.  Hair growth is absent to the level of the lower leg, Bilateral.     Neuro: Saph/sural/SP/DP/plantar sensation absent to light touch. Protective sensation is intact to  0 /10 sites as tested with a 5.07g SWMF, Bilateral.     Musculoskeletal: EHL/FHL/GS/TA gross motor intact. Gross deformity is present, rocker bottom deformity right foot with prominence at the site of ulceration. Dermatologic: Open wound present to plantar right midfoot as documented in detail below.  Wound base is fibrotic with slough. Leda-wound skin is hyperkeratotic with rolled edges. Negative probe to bone. There is no erythema. There is no purulent drainage. There is no fluctuance or crepitus. Interdigital maceration absent, Bilateral.       Assessment:      Active Hospital Problems    Diagnosis Date Noted    Right foot ulcer, with fat layer exposed [facility]) [L97.512] 06/29/2022     Priority: Medium    Charcot's joint of foot, right [M14.671] 06/29/2022     Priority: Medium    Diabetic polyneuropathy associated with type 2 diabetes mellitus ([facility] [E11.42] 03/27/2019       Plan:     Treatment Note please see attached Discharge Instructions    Discussed the importance of offloading for healing of a plantar neuropathic ulceration. Discussed NWB vs total contact cast.    She wishes to hold off on TCC at this time and continue with CROW. Discussed that if there is no improvement that she will need to consider offloading with the TCC. Recommended that she limit ambulation as much as possible in the meantime. Wear CROW at all times while walking. Coty to the wound base daily    Educated on signs and symptoms of infection. Instructed to call clinic immediately or go to ER if signs and symptoms of infection are present. RTC 1 week       Procedure Note  Indications:  Based on my examination of this patient's wound(s)/ulcer(s) today, debridement is required to promote healing and evaluate the wound base. Performed by: Michelle Hampton DPM    Consent obtained:  Yes    Time out taken:  Yes    Pain Control: Anesthetic  Anesthetic: 2% Lidocaine Gel Topical       Debridement: Excisional Debridement    Using curette and tissue nippers the wound(s)/ulcer(s) was/were sharply debrided down through and including the removal of subcutaneous tissue.         Devitalized Tissue Debrided:  fibrin, biofilm, slough and callus    Pre Debridement Measurements:  Are located in the [facility]  Documentation Flow Sheet    Wound/Ulcer #: 1    Post Debridement Measurements:  Wound/Ulcer Descriptions are Pre Debridement except measurements:    Wound 06/29/22 Foot Right;Plantar #1 (Active)   Wound Image   08/03/22 0834   Wound Etiology Diabetic [place] 2 08/31/22 3125   Dressing Status New drainage noted; Old drainage noted 08/31/22 0833   Wound Cleansed Cleansed with saline 08/31/22 0833   Offloading for Diabetic Foot Ulcers Offloading ordered; Offloading boot 08/31/22 0833   Wound Length (cm) 1 cm 08/31/22 0833   Wound Width (cm) 1.1 cm 08/31/22 0833   Wound Depth (cm) 0.3 cm 08/31/22 0833   Wound Surface Area (cm^2) 1.1 cm^2 08/31/22 0833   Change in Wound Size % (l*w) 63.82 08/31/22 0833   Wound Volume (cm^3) 0.33 cm^3 08/31/22 0833   Wound Healing % 82 08/31/22 0833   Post-Procedure Length (cm) 1.9 cm 08/31/22 0833   Post-Procedure Width (cm) 1.4 cm 08/31/22 1992   Post-Procedure Depth (cm) 0.3 cm 08/31/22 0833   Post-Procedure Surface Area (cm^2) 2.66 cm^2 08/31/22 0833   Post-Procedure Volume (cm^3) 0.798 cm^3 08/31/22 0833   Wound Assessment Pink/red 08/31/22 0833   Drainage Amount Moderate 08/31/22 0833   Drainage Description Yellow 08/31/22 0833   Odor None 08/31/22 0833   Leda-wound Assessment Maceration 08/31/22 0833   Margins Defined edges 08/31/22 0833   Wound Thickness Description not for Pressure Injury Full thickness 08/31/22 0833   Number of days: 63          Percent of Wound(s)/Ulcer(s) Debrided: 100%    Total Surface Area Debrided:  2.66 sq cm     Diabetic/Pressure/Non Pressure Ulcers only:  Ulcer: Diabetic ulcer, fat layer exposed     Estimated Blood Loss:  Estimated amount of blood loss is 2ml. Hemostasis Achieved:  by pressure    Response to treatment:  Well tolerated by patient. Written patient discharge instructions given to patient and signed by patient or POA.       Orders Placed This Encounter   Medications    lidocaine (XYLOCAINE) 2 % uro-jet     Orders Placed This Encounter   Procedures    Initiate Outpatient Wound Care Protocol Cleanse wound with saline    If wound contains bioburden or contamination cleanse with wound cleanser or antimicrobial solution     For normal periwound tissue without irritation nor maceration, apply topical skin protectant    For periwound tissue with irritation and/or maceration, apply zinc based product, topical steroid cream/ointment, or equivalent     For wounds with dry firm black eschar and/or without exudate, apply betadine and leave open to air      For wounds with scant/small to no exudate or drainage, apply wound gel, hydrocolloid, polymer, or equivalent and cover with secondary dressing/foam      For wounds with moderate/large exudate or drainage, apply alginate, hydrofiber, polymer, or equivalent and cover with secondary dressing/foam    For wounds with nonviable tissue requiring removal, apply chemical or mechanical debrider and cover with secondary dressing/foam    For wounds with tunneling, dead space, or cavity, fill or pack with strip/gauze/kerlex to fit and cover with secondary dressing/foam    For wounds with adequate granulation or epithelization, apply wound gel, hydrocolloid, polymer, collagen, or transparent film, and cover secondary dry dressing/foam    For wounds that need additional secondary dressing to help pad or control additional drainage/exudates, add foam, absorbent pad or hydrocolloid    For wounds with suspected or known infection, apply antimicrobial mesh and/or antimicrobial alginate/hydrofiber, or antimicrobial solution moistened gauze/kerlex, or equivalent and cover with secondary dressing/foam    Compression Management needed for edema control, apply multilayer compression or tubular garment or equivalent    Offloading Management needed for pressure relief, apply offloading shoe/boot or equivalent     Standing Status:   Standing     Number of Occurrences:   1          Discharge Instructions            [address] [facility] -Phone: [phone] Fax: [phone] Visit  Discharge Instructions / Physician Orders     DATE: 8/31/2022     Home Care:     SUPPLIES ORDERED THRU: [facility]     Wound Location: Right Plantar Foot     Cleanse with: Liquid antibacterial soap and water, rinse well      Dressing Orders: Coty to wound, Silicone Border Dressing     Frequency: Daily     Additional Orders: Increase protein to diet (meat, cheese, eggs, fish, peanut butter, nuts and beans)  Multivitamin daily  ELEVATE LEGS AS MUCH AS POSSIBLE  6/29/22- X-Ray Ordered  6/29/22- Vascular Studies Ordered     Your next appointment with [facility] is in 2 weeks with Dr. Krysten Cobos     (Please note your next appointment above and if you are unable to keep, kindly give a 24 hour notice. Thank you.)  If more than 15 min late we cannot guarantee you will be seen due to clinician schedule  Per Policy, Excessive cancellation will call for dismissal from program.     If you experience any of the following, please call the [employer] during business hours:  [phone]  Your Phone call may be forwarded to [employer] during business hours that [employer] is closed. * Increase in Pain  * Temperature over 101  * Increase in drainage from your wound  * Drainage with a foul odor  * Bleeding  * Increase in swelling  * Need for compression bandage changes due to slippage, breakthrough drainage. If you need medical attention outside of the business hours of the [facility] please contact your PCP or go to the nearest emergency room. The information contained in the After Visit Summary has been reviewed with me, the patient and/or responsible adult, by my health care provider(s). I had the opportunity to ask questions regarding this information.  I have elected to receive;      []After Visit Summary  [x]Comprehensive Discharge Instruction        Patient signature______________________________________Date:________  Electronically signed by El Garcia RN on 8/31/2022 at 8:44 AM  Electronically signed by Jose Riley DPM on 8/31/2022 at 8:25 AM        Electronically signed by Jose Riley DPM on 8/31/2022 at 8:45 AM

## 2022-09-06 DIAGNOSIS — Z79.4 TYPE 2 DIABETES MELLITUS WITH HYPERGLYCEMIA, WITH LONG-TERM CURRENT USE OF INSULIN (HCC): ICD-10-CM

## 2022-09-06 DIAGNOSIS — E11.65 TYPE 2 DIABETES MELLITUS WITH HYPERGLYCEMIA, WITH LONG-TERM CURRENT USE OF INSULIN (HCC): ICD-10-CM

## 2022-09-07 RX ORDER — ISOPROPYL ALCOHOL 70 ML/100ML
SWAB TOPICAL
Qty: 300 EACH | Refills: 5 | Status: SHIPPED | OUTPATIENT
Start: 2022-09-07

## 2022-09-07 RX ORDER — PEN NEEDLE, DIABETIC 32GX 5/32"
NEEDLE, DISPOSABLE MISCELLANEOUS
Qty: 100 EACH | Refills: 3 | Status: SHIPPED | OUTPATIENT
Start: 2022-09-07

## 2022-09-07 NOTE — TELEPHONE ENCOUNTER
Wander Trisha is calling to request a refill on the following medication(s):    Medication Request:  Requested Prescriptions     Pending Prescriptions Disp Refills    DROPLET PEN NEEDLES 32G X 4 MM MISC [Pharmacy Med Name: DROPLET PEN NEEDLES 57PA5AT 32G X 4 MM] 100 each 3     Sig: USE TO INJECT SUBCUTANEOUSLY EVERY DAY    Alcohol Swabs (DROPSAFE ALCOHOL PREP) 70 % PADS [Pharmacy Med Name: DROPSAFE ALCOHOL PREP PADS 70 % Pad]       Sig: USE THREE TIMES DAILY       Last Visit Date (If Applicable):  4/5/7969    Next Visit Date:    Visit date not found

## 2022-09-09 NOTE — DISCHARGE INSTRUCTIONS
[address] [facility] -Phone: [phone] Fax: [phone]    Visit  Discharge Instructions / Physician Orders     DATE: 9/14/2022     Home Care: N/A     SUPPLIES ORDERED THRU: [facility]     Wound Location: Right Plantar Foot     Cleanse with: Liquid antibacterial soap and water, rinse well      Dressing Orders: Coty to wound, Silicone Border Dressing     Frequency: Daily     Additional Orders: Increase protein to diet (meat, cheese, eggs, fish, peanut butter, nuts and beans)  Multivitamin daily  ELEVATE LEGS AS MUCH AS POSSIBLE  6/29/22- X-Ray Ordered  6/29/22- Vascular Studies Ordered     Your next appointment with [address] [place] [address] is in 2 weeks with Dr. Lux Nicolas     (Please note your next appointment above and if you are unable to keep, kindly give a 24 hour notice. Thank you.)  If more than 15 min late we cannot guarantee you will be seen due to clinician schedule  Per Policy, Excessive cancellation will call for dismissal from program.     If you experience any of the following, please call the [address] [place] [address] during business hours:  [phone]  Your Phone call may be forwarded to [employer] during business hours that [facility] is closed. * Increase in Pain  * Temperature over 101  * Increase in drainage from your wound  * Drainage with a foul odor  * Bleeding  * Increase in swelling  * Need for compression bandage changes due to slippage, breakthrough drainage. If you need medical attention outside of the business hours of the [address] [place] [address] please contact your PCP or go to the nearest emergency room. The information contained in the After Visit Summary has been reviewed with me, the patient and/or responsible adult, by my health care provider(s). I had the opportunity to ask questions regarding this information.  I have elected to receive;      []After Visit Summary  [x]Comprehensive Discharge Instruction        Patient signature______________________________________Date:________  Electronically signed by Kelli Damian DPM on 9/14/2022 at 8:29 AM  Electronically signed by Amanda Aguilar RN on 9/14/2022 at 9:05 AM

## 2022-09-14 ENCOUNTER — HOSPITAL ENCOUNTER (OUTPATIENT)
Dept: WOUND CARE | Age: 72
Discharge: HOME | End: 2022-09-14
Payer: MEDICARE

## 2022-09-14 VITALS
HEART RATE: 64 BPM | DIASTOLIC BLOOD PRESSURE: 60 MMHG | WEIGHT: 169 LBS | RESPIRATION RATE: 16 BRPM | TEMPERATURE: 97.4 F | HEIGHT: 69 IN | BODY MASS INDEX: 25.03 KG/M2 | SYSTOLIC BLOOD PRESSURE: 107 MMHG

## 2022-09-14 DIAGNOSIS — L97.512 RIGHT FOOT ULCER, WITH FAT LAYER EXPOSED (HCC): Primary | ICD-10-CM

## 2022-09-14 PROCEDURE — 11042 DBRDMT SUBQ TIS 1ST 20SQCM/<: CPT

## 2022-09-14 RX ORDER — LIDOCAINE 40 MG/G
CREAM TOPICAL ONCE
Status: CANCELLED | OUTPATIENT
Start: 2022-09-14 | End: 2022-09-14

## 2022-09-14 RX ORDER — BETAMETHASONE DIPROPIONATE 0.05 %
OINTMENT (GRAM) TOPICAL ONCE
Status: CANCELLED | OUTPATIENT
Start: 2022-09-14 | End: 2022-09-14

## 2022-09-14 RX ORDER — LIDOCAINE HYDROCHLORIDE 20 MG/ML
JELLY TOPICAL ONCE
Status: DISCONTINUED | OUTPATIENT
Start: 2022-09-14 | End: 2022-09-15 | Stop reason: HOSPADM

## 2022-09-14 RX ORDER — CLOBETASOL PROPIONATE 0.5 MG/G
OINTMENT TOPICAL ONCE
Status: CANCELLED | OUTPATIENT
Start: 2022-09-14 | End: 2022-09-14

## 2022-09-14 RX ORDER — BACITRACIN, NEOMYCIN, POLYMYXIN B 400; 3.5; 5 [USP'U]/G; MG/G; [USP'U]/G
OINTMENT TOPICAL ONCE
Status: CANCELLED | OUTPATIENT
Start: 2022-09-14 | End: 2022-09-14

## 2022-09-14 RX ORDER — LIDOCAINE HYDROCHLORIDE 20 MG/ML
JELLY TOPICAL ONCE
Status: CANCELLED | OUTPATIENT
Start: 2022-09-14 | End: 2022-09-14

## 2022-09-14 RX ORDER — BACITRACIN ZINC AND POLYMYXIN B SULFATE 500; 1000 [USP'U]/G; [USP'U]/G
OINTMENT TOPICAL ONCE
Status: CANCELLED | OUTPATIENT
Start: 2022-09-14 | End: 2022-09-14

## 2022-09-14 RX ORDER — LIDOCAINE 50 MG/G
OINTMENT TOPICAL ONCE
Status: CANCELLED | OUTPATIENT
Start: 2022-09-14 | End: 2022-09-14

## 2022-09-14 RX ORDER — LIDOCAINE HYDROCHLORIDE 40 MG/ML
SOLUTION TOPICAL ONCE
Status: CANCELLED | OUTPATIENT
Start: 2022-09-14 | End: 2022-09-14

## 2022-09-14 RX ORDER — GINSENG 100 MG
CAPSULE ORAL ONCE
Status: CANCELLED | OUTPATIENT
Start: 2022-09-14 | End: 2022-09-14

## 2022-09-14 RX ORDER — GENTAMICIN SULFATE 1 MG/G
OINTMENT TOPICAL ONCE
Status: CANCELLED | OUTPATIENT
Start: 2022-09-14 | End: 2022-09-14

## 2022-09-14 ASSESSMENT — ENCOUNTER SYMPTOMS
NAUSEA: 0
VOMITING: 0
DIARRHEA: 0

## 2022-09-14 ASSESSMENT — PAIN SCALES - GENERAL: PAINLEVEL_OUTOF10: 0

## 2022-09-14 NOTE — PROGRESS NOTES
[address]   Progress Note and Procedure Note      Kaylen Pace  MEDICAL RECORD NUMBER:  [id number]  AGE: 81 y.o. GENDER: female  : 1950  EPISODE DATE:  2022    Subjective:     Chief Complaint   Patient presents with    Wound Check     Right foot plantar         HISTORY of PRESENT ILLNESS HPI     Kaylen Pace is a 81 y.o. female who presents today for wound/ulcer evaluation. Current evaluation:   Sanjana Renner presents in her [address]. No sign of infection. Does not want to use the total contact cast; wishes to continue with CROW for offloading. Interval history:  XR was negative for sign of osteomyelitis. Sanjana Renner has scheduled the noninvasive vascular testing for . States she has an appointment tomorrow for Zora Curtis to [facility]. States that she is not able to use a knee scooter, crutches, or a walker to maintain non-weightbearing to the right lower extremity. History of Wound Context: Sanjana Renner presents for evaluation of plantar right foot ulceration. She states that it has been open for about 2 months now. She was seeing a podiatrist for this and was referred to the wound care clinic. She presents in a Crow on the right and a custom diabetic shoe on the left. She does have a history of Charcot. Has not had any sign or symptom of infection.     Ulcer Identification:  Ulcer Type: diabetic and neuropathic  Contributing Factors: edema, lymphedema, diabetes, chronic pressure and shear force          PAST MEDICAL HISTORY        Diagnosis Date    Diabetic polyneuropathy associated with type 1 diabetes mellitus ([address] 3/27/2019    Diabetic ulcer of left foot associated with diabetes mellitus due to underlying condition, with fat layer exposed ([address] 3/27/2019    Type 2 diabetes mellitus with hyperglycemia, with long-term current use of insulin ([address] 2018    Type II or unspecified type diabetes mellitus without mention of complication, not stated as uncontrolled        PAST SURGICAL HISTORY    Past Surgical History:   Procedure Laterality Date    FOOT SURGERY  +10years    R foot        FAMILY HISTORY    Family History   Problem Relation Age of Onset    Diabetes Mother     Diabetes Brother        SOCIAL HISTORY    Social History     Tobacco Use    Smoking status: Never    Smokeless tobacco: Never   Vaping Use    Vaping Use: Never used   Substance Use Topics    Alcohol use: No    Drug use: No       ALLERGIES    No Known Allergies    MEDICATIONS    Current Outpatient Medications on File Prior to Encounter   Medication Sig Dispense Refill    DROPLET PEN NEEDLES 32G X 4 MM MISC USE TO INJECT SUBCUTANEOUSLY EVERY  each 3    Alcohol Swabs (DROPSAFE ALCOHOL PREP) 70 % PADS USE THREE TIMES DAILY 300 each 5    glucose monitoring (FREESTYLE FREEDOM) kit 1 kit by Does not apply route daily 1 kit 0    blood glucose monitor strips Test 4 times a day & as needed for symptoms of irregular blood glucose. Dispense sufficient amount for indicated testing frequency plus additional to accommodate PRN testing needs.  300 strip 0    Lancets MISC 1 each by Does not apply route 4 times daily 100 each 5    TOUJEO SOLOSTAR 300 UNIT/ML SOPN INJECT 50 UNITS UNDER THE SKIN AT BEDTIME 5 pen 5    atorvastatin (LIPITOR) 10 MG tablet TAKE 1 TABLET EVERY DAY 90 tablet 3    metoclopramide (REGLAN) 5 MG tablet TAKE 1 TABLET EVERY DAY (Patient not taking: Reported on 5/27/2022) 90 tablet 1    furosemide (LASIX) 40 MG tablet TAKE 1 TABLET EVERY DAY 90 tablet 1    alendronate (FOSAMAX) 70 MG tablet TAKE 1 TABLET EVERY 7 DAYS 12 tablet 3    Accu-Chek Softclix Lancets MISC TEST THREE TIMES DAILY (Patient not taking: Reported on 4/5/2022) 300 each 3    blood glucose test strips (ACCU-CHEK MAMI PLUS) strip TEST THREE TIMES DAILY AS NEEDED (Patient not taking: Reported on 4/5/2022) 300 strip 3    potassium chloride (KLOR-CON M) 20 MEQ extended release tablet TAKE 1 TABLET EVERY DAY 90 tablet 1    levothyroxine (SYNTHROID) 50 MCG tablet Take 1 tablet by mouth daily 30 tablet 2    FERREX 150 FORTE 150-1-25 MG-MG-MCG CAPS capsule TAKE 1 CAPSULE BY MOUTH TWICE DAILY 30 capsule 11    acetaZOLAMIDE (DIAMOX) 500 MG extended release capsule       TRAVATAN Z 0.004 % SOLN ophthalmic solution       brimonidine (ALPHAGAN) 0.2 % ophthalmic solution Place 1 drop into both eyes 2 times daily 1 Bottle 0    aspirin (ASPIRIN LOW DOSE) 81 MG EC tablet Take 1 tablet by mouth daily 30 tablet 12    dorzolamide-timolol (COSOPT) 22.3-6.8 MG/ML ophthalmic solution        No current facility-administered medications on file prior to encounter. REVIEW OF SYSTEMS    Review of Systems   Constitutional:  Negative for chills and fever. Gastrointestinal:  Negative for diarrhea, nausea and vomiting. Skin:  Positive for wound. Objective:      /60   Pulse 64   Temp 97.4 °F (36.3 °C) (Tympanic)   Resp 16   Ht 5' 9\" (1.753 m)   Wt 169 lb (76.7 kg)   BMI 24.96 kg/m²     Wt Readings from Last 3 Encounters:   09/14/22 169 lb (76.7 kg)   08/31/22 169 lb (76.7 kg)   08/17/22 169 lb (76.7 kg)       Physical Exam:  General:  Alert and oriented x3. In no acute distress. Lower Extremity Physical Exam:    Vascular: DP pulse on the left is palpable and not palpable on the right. PT pulse on the right is palpable and not palpable on the left. CFT <3 seconds to all digits, Bilateral.  Pitting edema, Bilateral.  Hair growth is absent to the level of the lower leg, Bilateral.     Neuro: Saph/sural/SP/DP/plantar sensation absent to light touch. Protective sensation is intact to  0 /10 sites as tested with a 5.07g SWMF, Bilateral.     Musculoskeletal: EHL/FHL/GS/TA gross motor intact. Gross deformity is present, rocker bottom deformity right foot with prominence at the site of ulceration. Dermatologic: Open wound present to plantar right midfoot as documented in detail below. Wound base is fibrotic with slough. Leda-wound skin is hyperkeratotic with rolled edges. Negative probe to bone. There is no erythema. There is no purulent drainage. There is no fluctuance or crepitus. Interdigital maceration absent, Bilateral.       Assessment:      Active Hospital Problems    Diagnosis Date Noted    Right foot ulcer, with fat layer exposed [facility]) [L97.512] 06/29/2022     Priority: Medium    Charcot's joint of foot, right [M14.671] 06/29/2022     Priority: Medium    Diabetic polyneuropathy associated with type 2 diabetes mellitus ([facility] [address] [E11.42] 03/27/2019       Plan:     Treatment Note please see attached Discharge Instructions    Discussed the importance of offloading for healing of a plantar neuropathic ulceration. Discussed NWB vs total contact cast.    She wishes to hold off on TCC at this time and continue with CROW. Discussed that if there is no improvement that she will need to consider offloading with the TCC. Recommended that she limit ambulation as much as possible in the meantime. Wear CROW at all times while walking. Coty to the wound base daily    Educated on signs and symptoms of infection. Instructed to call clinic immediately or go to ER if signs and symptoms of infection are present. RTC 1 week       Procedure Note  Indications:  Based on my examination of this patient's wound(s)/ulcer(s) today, debridement is required to promote healing and evaluate the wound base. Performed by: Loren Tate DPM    Consent obtained:  Yes    Time out taken:  Yes    Pain Control: Anesthetic  Anesthetic: 2% Lidocaine Gel Topical       Debridement: Excisional Debridement    Using curette and tissue nippers the wound(s)/ulcer(s) was/were sharply debrided down through and including the removal of subcutaneous tissue.         Devitalized Tissue Debrided:  fibrin, biofilm, slough and callus    Pre Debridement Measurements:  Are located in the [facility]  Documentation Flow Sheet    Wound/Ulcer #: 1    Post Debridement Measurements:  Wound/Ulcer Descriptions are Pre Debridement except measurements:    Wound 06/29/22 Foot Right;Plantar #1 (Active)   Wound Image   08/03/22 0834   Wound Etiology Diabetic [place] 2 09/14/22 0842   Dressing Status New drainage noted; Old drainage noted 09/14/22 0842   Wound Cleansed Cleansed with saline 09/14/22 0842   Offloading for Diabetic Foot Ulcers Offloading ordered; Offloading boot 09/14/22 0842   Wound Length (cm) 0.6 cm 09/14/22 0842   Wound Width (cm) 0.8 cm 09/14/22 0842   Wound Depth (cm) 0.3 cm 09/14/22 0842   Wound Surface Area (cm^2) 0.48 cm^2 09/14/22 0842   Change in Wound Size % (l*w) 84.21 09/14/22 0842   Wound Volume (cm^3) 0.144 cm^3 09/14/22 0842   Wound Healing % 92 09/14/22 0842   Post-Procedure Length (cm) 1.2 cm 09/14/22 0842   Post-Procedure Width (cm) 1.4 cm 09/14/22 0842   Post-Procedure Depth (cm) 0.3 cm 09/14/22 0842   Post-Procedure Surface Area (cm^2) 1.68 cm^2 09/14/22 0842   Post-Procedure Volume (cm^3) 0.504 cm^3 09/14/22 0842   Wound Assessment Pink/red 09/14/22 0842   Drainage Amount Moderate 09/14/22 0842   Drainage Description Serosanguinous 09/14/22 0842   Odor None 09/14/22 0842   Leda-wound Assessment Hyperpigmented; Hyperkeratosis (callous) 09/14/22 0842   Margins Defined edges 09/14/22 0842   Wound Thickness Description not for Pressure Injury Full thickness 09/14/22 0842   Number of days: 77          Percent of Wound(s)/Ulcer(s) Debrided: 100%    Total Surface Area Debrided:  1.68 sq cm     Diabetic/Pressure/Non Pressure Ulcers only:  Ulcer: Diabetic ulcer, fat layer exposed     Estimated Blood Loss:  Estimated amount of blood loss is 2ml. Hemostasis Achieved:  by pressure    Response to treatment:  Well tolerated by patient. Written patient discharge instructions given to patient and signed by patient or POA.       Orders Placed This Encounter   Medications    lidocaine (XYLOCAINE) 2 % jelly     Orders Placed This Encounter   Procedures -Phone: [phone] Fax: [phone]    Visit  Discharge Instructions / Physician Orders     DATE: 9/14/2022     Home Care: N/A     SUPPLIES ORDERED THRU: [employer]     Wound Location: Right Plantar Foot     Cleanse with: Liquid antibacterial soap and water, rinse well      Dressing Orders: Coty to wound, Silicone Border Dressing     Frequency: Daily     Additional Orders: Increase protein to diet (meat, cheese, eggs, fish, peanut butter, nuts and beans)  Multivitamin daily  ELEVATE LEGS AS MUCH AS POSSIBLE  6/29/22- X-Ray Ordered  6/29/22- Vascular Studies Ordered     Your next appointment with [facility] is in 2 weeks with Dr. Michela Rader     (Please note your next appointment above and if you are unable to keep, kindly give a 24 hour notice. Thank you.)  If more than 15 min late we cannot guarantee you will be seen due to clinician schedule  Per Policy, Excessive cancellation will call for dismissal from program.     If you experience any of the following, please call the [facility] during business hours:  [phone]  Your Phone call may be forwarded to [employer] during business hours that [facility] [place] is closed. * Increase in Pain  * Temperature over 101  * Increase in drainage from your wound  * Drainage with a foul odor  * Bleeding  * Increase in swelling  * Need for compression bandage changes due to slippage, breakthrough drainage. If you need medical attention outside of the business hours of the [facility] please contact your PCP or go to the nearest emergency room. The information contained in the After Visit Summary has been reviewed with me, the patient and/or responsible adult, by my health care provider(s). I had the opportunity to ask questions regarding this information.  I have elected to receive;      []After Visit Summary  [x]Comprehensive Discharge Instruction        Patient signature______________________________________Date:________  Electronically signed by Luz Lind DPM on 9/14/2022 at 8:29 AM  Electronically signed by Compa Vasquez RN on 9/14/2022 at 9:05 AM       Electronically signed by Luz Lind DPM on 9/14/2022 at 9:07 AM

## 2022-09-23 NOTE — DISCHARGE INSTRUCTIONS
[address] [facility] -Phone: [phone] Fax: [phone]    Visit  Discharge Instructions / Physician Orders     DATE: 9/28/2022     Home Care: N/A     SUPPLIES ORDERED THRU: [facility] [employer]     Wound Location: Right Plantar Foot     Cleanse with: Liquid antibacterial soap and water, rinse well      Dressing Orders: Coty to wound, Silicone Border Dressing     Frequency: Daily     Additional Orders: Increase protein to diet (meat, cheese, eggs, fish, peanut butter, nuts and beans)  Multivitamin daily  ELEVATE LEGS AS MUCH AS POSSIBLE  6/29/22- X-Ray Ordered  6/29/22- Vascular Studies Ordered     Your next appointment with [facility] is in 2 weeks with Dr. Sugey Tucker     (Please note your next appointment above and if you are unable to keep, kindly give a 24 hour notice. Thank you.)  If more than 15 min late we cannot guarantee you will be seen due to clinician schedule  Per Policy, Excessive cancellation will call for dismissal from program.     If you experience any of the following, please call the [facility] [place] [facility] during business hours:  [phone]  Your Phone call may be forwarded to [facility] during business hours that [facility] is closed. * Increase in Pain  * Temperature over 101  * Increase in drainage from your wound  * Drainage with a foul odor  * Bleeding  * Increase in swelling  * Need for compression bandage changes due to slippage, breakthrough drainage. If you need medical attention outside of the business hours of the [facility] please contact your PCP or go to the nearest emergency room. The information contained in the After Visit Summary has been reviewed with me, the patient and/or responsible adult, by my health care provider(s). I had the opportunity to ask questions regarding this information.  I have elected to receive;      []After Visit Summary  [x]Comprehensive Discharge Instruction        Patient signature______________________________________Date:________  Electronically signed by Haider Coleman RN on 9/28/2022 at 9:17 AM  Electronically signed by Colton Osborne DPM on 9/28/2022 at 8:32 AM

## 2022-09-28 ENCOUNTER — HOSPITAL ENCOUNTER (OUTPATIENT)
Dept: WOUND CARE | Age: 72
Discharge: HOME | End: 2022-09-28
Payer: MEDICARE

## 2022-09-28 DIAGNOSIS — L97.512 RIGHT FOOT ULCER, WITH FAT LAYER EXPOSED (HCC): Primary | ICD-10-CM

## 2022-09-28 PROCEDURE — 11042 DBRDMT SUBQ TIS 1ST 20SQCM/<: CPT

## 2022-09-28 RX ORDER — GENTAMICIN SULFATE 1 MG/G
OINTMENT TOPICAL ONCE
Status: CANCELLED | OUTPATIENT
Start: 2022-09-28 | End: 2022-09-28

## 2022-09-28 RX ORDER — LIDOCAINE 50 MG/G
OINTMENT TOPICAL ONCE
Status: CANCELLED | OUTPATIENT
Start: 2022-09-28 | End: 2022-09-28

## 2022-09-28 RX ORDER — BETAMETHASONE DIPROPIONATE 0.05 %
OINTMENT (GRAM) TOPICAL ONCE
Status: CANCELLED | OUTPATIENT
Start: 2022-09-28 | End: 2022-09-28

## 2022-09-28 RX ORDER — BACITRACIN ZINC AND POLYMYXIN B SULFATE 500; 1000 [USP'U]/G; [USP'U]/G
OINTMENT TOPICAL ONCE
Status: CANCELLED | OUTPATIENT
Start: 2022-09-28 | End: 2022-09-28

## 2022-09-28 RX ORDER — LIDOCAINE HYDROCHLORIDE 20 MG/ML
JELLY TOPICAL ONCE
Status: COMPLETED | OUTPATIENT
Start: 2022-09-28 | End: 2022-09-28

## 2022-09-28 RX ORDER — LIDOCAINE HYDROCHLORIDE 20 MG/ML
JELLY TOPICAL ONCE
Status: CANCELLED | OUTPATIENT
Start: 2022-09-28 | End: 2022-09-28

## 2022-09-28 RX ORDER — CLOBETASOL PROPIONATE 0.5 MG/G
OINTMENT TOPICAL ONCE
Status: CANCELLED | OUTPATIENT
Start: 2022-09-28 | End: 2022-09-28

## 2022-09-28 RX ORDER — LIDOCAINE HYDROCHLORIDE 40 MG/ML
SOLUTION TOPICAL ONCE
Status: CANCELLED | OUTPATIENT
Start: 2022-09-28 | End: 2022-09-28

## 2022-09-28 RX ORDER — LIDOCAINE 40 MG/G
CREAM TOPICAL ONCE
Status: CANCELLED | OUTPATIENT
Start: 2022-09-28 | End: 2022-09-28

## 2022-09-28 RX ORDER — GINSENG 100 MG
CAPSULE ORAL ONCE
Status: CANCELLED | OUTPATIENT
Start: 2022-09-28 | End: 2022-09-28

## 2022-09-28 RX ORDER — BACITRACIN, NEOMYCIN, POLYMYXIN B 400; 3.5; 5 [USP'U]/G; MG/G; [USP'U]/G
OINTMENT TOPICAL ONCE
Status: CANCELLED | OUTPATIENT
Start: 2022-09-28 | End: 2022-09-28

## 2022-09-28 RX ADMIN — LIDOCAINE HYDROCHLORIDE 6 ML: 20 JELLY TOPICAL at 08:28

## 2022-09-28 NOTE — PROGRESS NOTES
[address]   Progress Note and Procedure Note      Karen Mcmillan  MEDICAL RECORD NUMBER:  [id number]  AGE: 81 y.o. GENDER: female  : 1950  EPISODE DATE:  2022    Subjective:     Chief Complaint   Patient presents with    Wound Check     Right foot         HISTORY of PRESENT ILLNESS HPI     Karne Mcmillan is a 81 y.o. female who presents today for wound/ulcer evaluation. Current evaluation:   Elias Rich presents in her [address]. No sign of infection. Does not want to use the total contact cast; wishes to continue with CROW for offloading. Interval history:  XR was negative for sign of osteomyelitis. Elias Rich has scheduled the noninvasive vascular testing for . States she has an appointment tomorrow for Jozef to modify the [place]. States that she is not able to use a knee scooter, crutches, or a walker to maintain non-weightbearing to the right lower extremity. History of Wound Context: Elias Rich presents for evaluation of plantar right foot ulceration. She states that it has been open for about 2 months now. She was seeing a podiatrist for this and was referred to the wound care clinic. She presents in a Crow on the right and a custom diabetic shoe on the left. She does have a history of Charcot. Has not had any sign or symptom of infection.     Ulcer Identification:  Ulcer Type: diabetic and neuropathic  Contributing Factors: edema, lymphedema, diabetes, chronic pressure and shear force          PAST MEDICAL HISTORY        Diagnosis Date    Diabetic polyneuropathy associated with type 1 diabetes mellitus ([address] 3/27/2019    Diabetic ulcer of left foot associated with diabetes mellitus due to underlying condition, with fat layer exposed ([address] 3/27/2019    Type 2 diabetes mellitus with hyperglycemia, with long-term current use of insulin ([address] 2018    Type II or unspecified type diabetes mellitus without mention of complication, not stated as uncontrolled        PAST SURGICAL HISTORY    Past Surgical History:   Procedure Laterality Date    FOOT SURGERY  +10years    R foot        FAMILY HISTORY    Family History   Problem Relation Age of Onset    Diabetes Mother     Diabetes Brother        SOCIAL HISTORY    Social History     Tobacco Use    Smoking status: Never    Smokeless tobacco: Never   Vaping Use    Vaping Use: Never used   Substance Use Topics    Alcohol use: No    Drug use: No       ALLERGIES    No Known Allergies    MEDICATIONS    Current Outpatient Medications on File Prior to Encounter   Medication Sig Dispense Refill    DROPLET PEN NEEDLES 32G X 4 MM MISC USE TO INJECT SUBCUTANEOUSLY EVERY  each 3    Alcohol Swabs (DROPSAFE ALCOHOL PREP) 70 % PADS USE THREE TIMES DAILY 300 each 5    glucose monitoring (FREESTYLE FREEDOM) kit 1 kit by Does not apply route daily 1 kit 0    blood glucose monitor strips Test 4 times a day & as needed for symptoms of irregular blood glucose. Dispense sufficient amount for indicated testing frequency plus additional to accommodate PRN testing needs.  300 strip 0    Lancets MISC 1 each by Does not apply route 4 times daily 100 each 5    TOUJEO SOLOSTAR 300 UNIT/ML SOPN INJECT 50 UNITS UNDER THE SKIN AT BEDTIME 5 pen 5    atorvastatin (LIPITOR) 10 MG tablet TAKE 1 TABLET EVERY DAY 90 tablet 3    metoclopramide (REGLAN) 5 MG tablet TAKE 1 TABLET EVERY DAY (Patient not taking: Reported on 5/27/2022) 90 tablet 1    furosemide (LASIX) 40 MG tablet TAKE 1 TABLET EVERY DAY 90 tablet 1    alendronate (FOSAMAX) 70 MG tablet TAKE 1 TABLET EVERY 7 DAYS 12 tablet 3    Accu-Chek Softclix Lancets MISC TEST THREE TIMES DAILY (Patient not taking: Reported on 4/5/2022) 300 each 3    blood glucose test strips (ACCU-CHEK MAMI PLUS) strip TEST THREE TIMES DAILY AS NEEDED (Patient not taking: Reported on 4/5/2022) 300 strip 3    potassium chloride (KLOR-CON M) 20 MEQ extended release tablet TAKE 1 TABLET EVERY DAY 90 tablet 1 levothyroxine (SYNTHROID) 50 MCG tablet Take 1 tablet by mouth daily 30 tablet 2    FERREX 150 FORTE 150-1-25 MG-MG-MCG CAPS capsule TAKE 1 CAPSULE BY MOUTH TWICE DAILY 30 capsule 11    acetaZOLAMIDE (DIAMOX) 500 MG extended release capsule       TRAVATAN Z 0.004 % SOLN ophthalmic solution       brimonidine (ALPHAGAN) 0.2 % ophthalmic solution Place 1 drop into both eyes 2 times daily 1 Bottle 0    aspirin (ASPIRIN LOW DOSE) 81 MG EC tablet Take 1 tablet by mouth daily 30 tablet 12    dorzolamide-timolol (COSOPT) 22.3-6.8 MG/ML ophthalmic solution        No current facility-administered medications on file prior to encounter. REVIEW OF SYSTEMS    Review of Systems   Constitutional:  Negative for chills and fever. Skin:  Positive for wound. Objective: There were no vitals taken for this visit. Wt Readings from Last 3 Encounters:   09/14/22 169 lb (76.7 kg)   08/31/22 169 lb (76.7 kg)   08/17/22 169 lb (76.7 kg)       Physical Exam:  General:  Alert and oriented x3. In no acute distress. Lower Extremity Physical Exam:    Vascular: DP pulse on the left is palpable and not palpable on the right. PT pulse on the right is palpable and not palpable on the left. CFT <3 seconds to all digits, Bilateral.  Pitting edema, Bilateral.  Hair growth is absent to the level of the lower leg, Bilateral.     Neuro: Saph/sural/SP/DP/plantar sensation absent to light touch. Protective sensation is intact to  0 /10 sites as tested with a 5.07g SWMF, Bilateral.     Musculoskeletal: EHL/FHL/GS/TA gross motor intact. Gross deformity is present, rocker bottom deformity right foot with prominence at the site of ulceration. Dermatologic: Open wound present to plantar right midfoot as documented in detail below. Wound base is fibrotic with slough. Leda-wound skin is hyperkeratotic. Negative probe to bone. There is no erythema. There is no purulent drainage. There is no fluctuance or crepitus.  Interdigital maceration absent, Bilateral.       Assessment:      Active Hospital Problems    Diagnosis Date Noted    Right foot ulcer, with fat layer exposed [facility]) [L97.512] 06/29/2022     Priority: Medium    Charcot's joint of foot, right [M14.671] 06/29/2022     Priority: Medium    Diabetic polyneuropathy associated with type 2 diabetes mellitus ([facility] [E11.42] 03/27/2019       Plan:     Treatment Note please see attached Discharge Instructions    Discussed the importance of offloading for healing of a plantar neuropathic ulceration. Discussed NWB vs total contact cast.    She wishes to hold off on TCC at this time and continue with CROW. Has been improving. Recommended that she limit ambulation as much as possible in the meantime. Wear CROW at all times while walking. Coty to the wound base daily    Educated on signs and symptoms of infection. Instructed to call clinic immediately or go to ER if signs and symptoms of infection are present. RTC 1 week       Procedure Note  Indications:  Based on my examination of this patient's wound(s)/ulcer(s) today, debridement is required to promote healing and evaluate the wound base. Performed by: Chris Villalpando DPM    Consent obtained:  Yes    Time out taken:  Yes    Pain Control: Anesthetic  Anesthetic: 2% Lidocaine Gel Topical       Debridement: Excisional Debridement    Using curette and tissue nippers the wound(s)/ulcer(s) was/were sharply debrided down through and including the removal of subcutaneous tissue. Devitalized Tissue Debrided:  fibrin, biofilm, slough and callus    Pre Debridement Measurements:  Are located in the [facility]  Documentation Flow Sheet    Wound/Ulcer #: 1    Post Debridement Measurements:  Wound/Ulcer Descriptions are Pre Debridement except measurements:    Wound 06/29/22 Foot Right;Plantar #1 (Active)   Wound Image   09/28/22 0830   Wound Etiology Diabetic [place] 2 09/28/22 0830   Dressing Status New drainage noted; Old drainage noted 09/28/22 0830   Wound Cleansed Cleansed with saline 09/28/22 0830   Offloading for Diabetic Foot Ulcers Offloading ordered; Offloading boot 09/28/22 0830   Wound Length (cm) 0.5 cm 09/28/22 0830   Wound Width (cm) 0.7 cm 09/28/22 0830   Wound Depth (cm) 0.3 cm 09/28/22 0830   Wound Surface Area (cm^2) 0.35 cm^2 09/28/22 0830   Change in Wound Size % (l*w) 88.49 09/28/22 0830   Wound Volume (cm^3) 0.105 cm^3 09/28/22 0830   Wound Healing % 94 09/28/22 0830   Post-Procedure Length (cm) 1 cm 09/28/22 0830   Post-Procedure Width (cm) 0.7 cm 09/28/22 0830   Post-Procedure Depth (cm) 0.3 cm 09/28/22 0830   Post-Procedure Surface Area (cm^2) 0.7 cm^2 09/28/22 0830   Post-Procedure Volume (cm^3) 0.21 cm^3 09/28/22 0830   Wound Assessment Pink/red 09/28/22 0830   Drainage Amount Large 09/28/22 0830   Drainage Description Yellow 09/28/22 0830   Odor None 09/28/22 0830   Leda-wound Assessment Hyperkeratosis (callous); Maceration 09/28/22 0830   Margins Defined edges 09/28/22 0830   Wound Thickness Description not for Pressure Injury Full thickness 09/28/22 0830   Number of days: 91          Percent of Wound(s)/Ulcer(s) Debrided: 100%    Total Surface Area Debrided:  0.7 sq cm     Diabetic/Pressure/Non Pressure Ulcers only:  Ulcer: Diabetic ulcer, fat layer exposed     Estimated Blood Loss:  Estimated amount of blood loss is 2ml. Hemostasis Achieved:  by pressure    Response to treatment:  Well tolerated by patient. Written patient discharge instructions given to patient and signed by patient or POA.       Orders Placed This Encounter   Medications    lidocaine (XYLOCAINE) 2 % uro-jet     Orders Placed This Encounter   Procedures    Initiate Outpatient Wound Care Protocol     Cleanse wound with saline    If wound contains bioburden or contamination cleanse with wound cleanser or antimicrobial solution     For normal periwound tissue without irritation nor maceration, apply topical skin protectant    For periwound tissue with irritation and/or maceration, apply zinc based product, topical steroid cream/ointment, or equivalent     For wounds with dry firm black eschar and/or without exudate, apply betadine and leave open to air      For wounds with scant/small to no exudate or drainage, apply wound gel, hydrocolloid, polymer, or equivalent and cover with secondary dressing/foam      For wounds with moderate/large exudate or drainage, apply alginate, hydrofiber, polymer, or equivalent and cover with secondary dressing/foam    For wounds with nonviable tissue requiring removal, apply chemical or mechanical debrider and cover with secondary dressing/foam    For wounds with tunneling, dead space, or cavity, fill or pack with strip/gauze/kerlex to fit and cover with secondary dressing/foam    For wounds with adequate granulation or epithelization, apply wound gel, hydrocolloid, polymer, collagen, or transparent film, and cover secondary dry dressing/foam    For wounds that need additional secondary dressing to help pad or control additional drainage/exudates, add foam, absorbent pad or hydrocolloid    For wounds with suspected or known infection, apply antimicrobial mesh and/or antimicrobial alginate/hydrofiber, or antimicrobial solution moistened gauze/kerlex, or equivalent and cover with secondary dressing/foam    Compression Management needed for edema control, apply multilayer compression or tubular garment or equivalent    Offloading Management needed for pressure relief, apply offloading shoe/boot or equivalent     Standing Status:   Standing     Number of Occurrences:   1          Discharge Instructions            [address] [facility] -Phone: [phone] Fax: [phone]    Visit  Discharge Instructions / Physician Orders     DATE: 9/28/2022     Home Care: N/A     SUPPLIES ORDERED THRU: [facility] [employer]     Wound Location: Right Plantar Foot     Cleanse with: Liquid antibacterial soap and water, rinse well Dressing Orders: Coty to wound, Silicone Border Dressing     Frequency: Daily     Additional Orders: Increase protein to diet (meat, cheese, eggs, fish, peanut butter, nuts and beans)  Multivitamin daily  ELEVATE LEGS AS MUCH AS POSSIBLE  6/29/22- X-Ray Ordered  6/29/22- Vascular Studies Ordered     [facility] 2 weeks with Dr. Aaron Javed     (Please note your next appointment above and if you are unable to keep, kindly give a 24 hour notice. Thank you.)  If more than 15 min late we cannot guarantee you will be seen due to clinician schedule  Per Policy, Excessive cancellation will call for dismissal from program.     If you experience any of the following, please call the [facility] during business hours:  [phone]  Your Phone call may be forwarded to [facility] during business hours that [facility] is closed. * Increase in Pain  * Temperature over 101  * Increase in drainage from your wound  * Drainage with a foul odor  * Bleeding  * Increase in swelling  * Need for compression bandage changes due to slippage, breakthrough drainage. If you need medical attention outside of the business hours of the [address] [place] [address] [facility] please contact your PCP or go to the nearest emergency room. The information contained in the After Visit Summary has been reviewed with me, the patient and/or responsible adult, by my health care provider(s). I had the opportunity to ask questions regarding this information.  I have elected to receive;      []After Visit Summary  [x]Comprehensive Discharge Instruction        Patient signature______________________________________Date:________  Electronically signed by Montana Garnett RN on 9/28/2022 at 9:17 AM  Electronically signed by Gio Melton DPM on 9/28/2022 at 8:32 AM        Electronically signed by Gio Melton DPM on 9/28/2022 at 9:17 AM

## 2022-09-28 NOTE — PROGRESS NOTES
[address]:     [employer] [facility] [id number] [address] p: [phone] f: [phone]     Ordering Center:     [facility]  [address] [facility]  [phone]  WOUND CARE Dept: [address] [place] [address] Avenue NUMBER [phone]    Patient Information:      Chidi Velazco  [address] [facility] [address] [place] [phone]   : 1950  AGE: 81 y.o. GENDER: female   TODAYS DATE:  2022    Insurance:      PRIMARY INSURANCE:  Plan: [employer] PLUS HMO  Coverage: [employer] MEDICARE  Effective Date: 2015  [id number] - (Medicare Managed)  Group: [id number]    Patient Wound Information:      Diagnosis Date Noted     Right foot ulcer, with fat layer exposed [facility]) [L97.512] 2022       Priority: Medium    Charcot's joint of foot, right [M14.671] 2022       Priority: Medium    Diabetic polyneuropathy associated with type 2 diabetes mellitus ([facility] [E11.42] 2019          WOUNDS REQUIRING DRESSING SUPPLIES:     Wound 22 Foot Right;Plantar #1 (Active)   Wound Image   22 0830   Wound Etiology Diabetic [place] 2 22 0830   Dressing Status New drainage noted; Old drainage noted 22 0830   Wound Cleansed Cleansed with saline 22 0830   Offloading for Diabetic Foot Ulcers Offloading ordered; Offloading boot 22 0830   Wound Length (cm) 0.5 cm 22 0830   Wound Width (cm) 0.7 cm 22 0830   Wound Depth (cm) 0.3 cm 22 0830   Wound Surface Area (cm^2) 0.35 cm^2 22 0830   Change in Wound Size % (l*w) 88.49 22 0830   Wound Volume (cm^3) 0.105 cm^3 22 0830   Wound Healing % 94 22   Post-Procedure Length (cm) 1 cm 22   Post-Procedure Width (cm) 0.7 cm 22   Post-Procedure Depth (cm) 0.3 cm 22   Post-Procedure Surface Area (cm^2) 0.7 cm^2 22   Post-Procedure Volume (cm^3) 0.21 cm^3 22   Wound Assessment Pink/red 09/28/22 0830   Drainage Amount Large 09/28/22 0830   Drainage Description Yellow 09/28/22 0830   Odor None 09/28/22 0830   Leda-wound Assessment Hyperkeratosis (callous); Maceration 09/28/22 0830   Margins Defined edges 09/28/22 0830   Wound Thickness Description not for Pressure Injury Full thickness 09/28/22 0830   Number of days: 91          Supplies Requested :      WOUND #: 1   PRIMARY DRESSING:  Collagen with silver-Coty   Cover and Secure with:  Other Excel SAP 4X4     FREQUENCY OF DRESSING CHANGES:  Daily     ADDITIONAL ITEMS:  [] Gloves Small  [x] Gloves Medium [] Gloves Large [] Gloves Tasha Held  [] Tape 1\" [] Tape 2\" [] Tape 3\"  [] Medipore Tape  [x] Saline  [] Skin Prep   [] Adhesive Remover   [] Cotton Tip Applicators   [] Other:    Patient Wound(s) Debrided: [x] Yes   [] No    Debribement Type: subcutaneous tissue    Debridement Date: 9/28/2022    Patient currently being seen by Home Health: [] Yes   [x] No    Duration for needed supplies:  []15  [x]30  []60  []90 Days    Provider Information:      Providers Name: Dr. Kourtney Harry DPM   NPI: [id number]

## 2022-09-30 ENCOUNTER — OFFICE VISIT (OUTPATIENT)
Dept: FAMILY MEDICINE CLINIC | Age: 72
End: 2022-09-30
Payer: MEDICARE

## 2022-09-30 VITALS
SYSTOLIC BLOOD PRESSURE: 115 MMHG | WEIGHT: 164 LBS | BODY MASS INDEX: 24.29 KG/M2 | HEIGHT: 69 IN | HEART RATE: 63 BPM | DIASTOLIC BLOOD PRESSURE: 62 MMHG | OXYGEN SATURATION: 95 %

## 2022-09-30 DIAGNOSIS — E55.9 VITAMIN D DEFICIENCY: ICD-10-CM

## 2022-09-30 DIAGNOSIS — K21.00 GASTROESOPHAGEAL REFLUX DISEASE WITH ESOPHAGITIS WITHOUT HEMORRHAGE: ICD-10-CM

## 2022-09-30 DIAGNOSIS — E78.00 PURE HYPERCHOLESTEROLEMIA: ICD-10-CM

## 2022-09-30 DIAGNOSIS — Z12.31 SCREENING MAMMOGRAM, ENCOUNTER FOR: ICD-10-CM

## 2022-09-30 DIAGNOSIS — Z79.4 TYPE 2 DIABETES MELLITUS WITH HYPERGLYCEMIA, WITH LONG-TERM CURRENT USE OF INSULIN (HCC): ICD-10-CM

## 2022-09-30 DIAGNOSIS — E11.65 TYPE 2 DIABETES MELLITUS WITH HYPERGLYCEMIA, WITH LONG-TERM CURRENT USE OF INSULIN (HCC): ICD-10-CM

## 2022-09-30 DIAGNOSIS — E03.9 ACQUIRED HYPOTHYROIDISM: ICD-10-CM

## 2022-09-30 DIAGNOSIS — M85.80 OSTEOPENIA, UNSPECIFIED LOCATION: ICD-10-CM

## 2022-09-30 DIAGNOSIS — R79.9 ELEVATED BUN: ICD-10-CM

## 2022-09-30 DIAGNOSIS — R60.0 EDEMA OF BOTH LEGS: ICD-10-CM

## 2022-09-30 DIAGNOSIS — D50.9 IRON DEFICIENCY ANEMIA, UNSPECIFIED IRON DEFICIENCY ANEMIA TYPE: ICD-10-CM

## 2022-09-30 DIAGNOSIS — Z00.00 INITIAL MEDICARE ANNUAL WELLNESS VISIT: ICD-10-CM

## 2022-09-30 DIAGNOSIS — Z12.11 SCREEN FOR COLON CANCER: ICD-10-CM

## 2022-09-30 DIAGNOSIS — D50.8 OTHER IRON DEFICIENCY ANEMIA: ICD-10-CM

## 2022-09-30 DIAGNOSIS — Z00.00 MEDICARE ANNUAL WELLNESS VISIT, SUBSEQUENT: Primary | ICD-10-CM

## 2022-09-30 LAB — HBA1C MFR BLD: 6.1 %

## 2022-09-30 PROCEDURE — G0438 PPPS, INITIAL VISIT: HCPCS | Performed by: FAMILY MEDICINE

## 2022-09-30 PROCEDURE — 1124F ACP DISCUSS-NO DSCNMKR DOCD: CPT | Performed by: FAMILY MEDICINE

## 2022-09-30 PROCEDURE — 83036 HEMOGLOBIN GLYCOSYLATED A1C: CPT | Performed by: FAMILY MEDICINE

## 2022-09-30 PROCEDURE — 3017F COLORECTAL CA SCREEN DOC REV: CPT | Performed by: FAMILY MEDICINE

## 2022-09-30 PROCEDURE — 3044F HG A1C LEVEL LT 7.0%: CPT | Performed by: FAMILY MEDICINE

## 2022-09-30 SDOH — ECONOMIC STABILITY: FOOD INSECURITY: WITHIN THE PAST 12 MONTHS, THE FOOD YOU BOUGHT JUST DIDN'T LAST AND YOU DIDN'T HAVE MONEY TO GET MORE.: NEVER TRUE

## 2022-09-30 SDOH — ECONOMIC STABILITY: FOOD INSECURITY: WITHIN THE PAST 12 MONTHS, YOU WORRIED THAT YOUR FOOD WOULD RUN OUT BEFORE YOU GOT MONEY TO BUY MORE.: NEVER TRUE

## 2022-09-30 ASSESSMENT — SOCIAL DETERMINANTS OF HEALTH (SDOH): HOW HARD IS IT FOR YOU TO PAY FOR THE VERY BASICS LIKE FOOD, HOUSING, MEDICAL CARE, AND HEATING?: NOT HARD AT ALL

## 2022-09-30 ASSESSMENT — PATIENT HEALTH QUESTIONNAIRE - PHQ9
1. LITTLE INTEREST OR PLEASURE IN DOING THINGS: 0
SUM OF ALL RESPONSES TO PHQ QUESTIONS 1-9: 0
SUM OF ALL RESPONSES TO PHQ QUESTIONS 1-9: 0
SUM OF ALL RESPONSES TO PHQ9 QUESTIONS 1 & 2: 0
SUM OF ALL RESPONSES TO PHQ QUESTIONS 1-9: 0
2. FEELING DOWN, DEPRESSED OR HOPELESS: 0
SUM OF ALL RESPONSES TO PHQ QUESTIONS 1-9: 0

## 2022-09-30 ASSESSMENT — LIFESTYLE VARIABLES
HOW MANY STANDARD DRINKS CONTAINING ALCOHOL DO YOU HAVE ON A TYPICAL DAY: PATIENT DOES NOT DRINK
HOW OFTEN DO YOU HAVE A DRINK CONTAINING ALCOHOL: NEVER

## 2022-09-30 NOTE — PROGRESS NOTES
Medicare Annual Wellness Visit    Ashok Rangel is here for Medicare AWV    Assessment & Plan   Medicare annual wellness visit, subsequent  Type 2 diabetes mellitus with hyperglycemia, with long-term current use of insulin ([address]  -     POCT glycosylated hemoglobin (Hb A1C)  -     CBC with Auto Differential; Future  -     Comprehensive Metabolic Panel; Future  -     Lipid Panel; Future  -     T4, Free; Future  -     TSH; Future  -     Vitamin D 25 Hydroxy; Future  Edema of both legs  -     POCT glycosylated hemoglobin (Hb A1C)  -     CBC with Auto Differential; Future  -     Comprehensive Metabolic Panel; Future  -     Lipid Panel; Future  -     T4, Free; Future  -     TSH; Future  -     Vitamin D 25 Hydroxy; Future  Pure hypercholesterolemia  -     POCT glycosylated hemoglobin (Hb A1C)  -     CBC with Auto Differential; Future  -     Comprehensive Metabolic Panel; Future  -     Lipid Panel; Future  -     T4, Free; Future  -     TSH; Future  -     Vitamin D 25 Hydroxy; Future  Vitamin D deficiency  -     POCT glycosylated hemoglobin (Hb A1C)  -     CBC with Auto Differential; Future  -     Comprehensive Metabolic Panel; Future  -     Lipid Panel; Future  -     T4, Free; Future  -     TSH; Future  -     Vitamin D 25 Hydroxy; Future  Acquired hypothyroidism  -     POCT glycosylated hemoglobin (Hb A1C)  -     CBC with Auto Differential; Future  -     Comprehensive Metabolic Panel; Future  -     Lipid Panel; Future  -     T4, Free; Future  -     TSH; Future  -     Vitamin D 25 Hydroxy; Future  Iron deficiency anemia, unspecified iron deficiency anemia type  -     POCT glycosylated hemoglobin (Hb A1C)  -     CBC with Auto Differential; Future  -     Comprehensive Metabolic Panel; Future  -     Lipid Panel; Future  -     T4, Free; Future  -     TSH; Future  -     Vitamin D 25 Hydroxy;  Future  Gastroesophageal reflux disease with esophagitis without hemorrhage  -     POCT glycosylated hemoglobin (Hb A1C)  -     CBC with Interventions:             General Health and ACP:  General  In general, how would you say your health is?: Very Good  In the past 7 days, have you experienced any of the following: New or Increased Pain, New or Increased Fatigue, Loneliness, Social Isolation, Stress or Anger?: No  Do you get the social and emotional support that you need?: Yes  Do you have a Living Will?: Yes    Advance Directives       Power of  Living Will ACP-Advance Directive ACP-Power of     Not on File Not on File Not on File Not on File        General Health Risk Interventions:  No new issues     Health Habits/Nutrition:  Physical Activity: Inactive    Days of Exercise per Week: 0 days    Minutes of Exercise per Session: 0 min     Have you lost any weight without trying in the past 3 months?: No  Body mass index: 24.22  Have you seen the dentist within the past year?: Yes  Health Habits/Nutrition Interventions:  No complaints       ADLs:  In the past 7 days, did you need help from others to perform any of the following everyday activities: Eating, dressing, grooming, bathing, toileting, or walking/balance?: No  In the past 7 days, did you need help from others to take care of any of the following: Laundry, housekeeping, banking/finances, shopping, telephone use, food preparation, transportation, or taking medications?: (!) Yes  Select all that apply: (!) Transportation  ADL Interventions:  Patient declines any further evaluation/treatment for this issue          Objective   Vitals:    09/30/22 0911   BP: 115/62   Pulse: 63   SpO2: 95%   Weight: 164 lb (74.4 kg)   Height: 5' 9\" (1.753 m)      Body mass index is 24.22 kg/m².       General Appearance: alert and oriented to person, place and time, well developed and well- nourished, in no acute distress  Skin: warm and dry, no rash or erythema  Head: normocephalic and atraumatic  Eyes: pupils equal, round, and reactive to light, extraocular eye movements intact, conjunctivae normal  ENT: tympanic membrane, external ear and ear canal normal bilaterally, nose without deformity, nasal mucosa and turbinates normal without polyps  Neck: supple and non-tender without mass, no thyromegaly or thyroid nodules, no cervical lymphadenopathy  Pulmonary/Chest: clear to auscultation bilaterally- no wheezes, rales or rhonchi, normal air movement, no respiratory distress  Cardiovascular: normal rate, regular rhythm, normal S1 and S2, no murmurs, rubs, clicks, or gallops, distal pulses intact, no carotid bruits  Abdomen: soft, non-tender, non-distended, normal bowel sounds, no masses or organomegaly  Extremities: no cyanosis, clubbing or edema  Musculoskeletal: normal range of motion, no joint swelling, deformity or tenderness  Neurologic: reflexes normal and symmetric, no cranial nerve deficit, gait, coordination and speech normal       No Known Allergies  Prior to Visit Medications    Medication Sig Taking? Authorizing Provider   DROPLET PEN NEEDLES 32G X 4 MM MISC USE TO INJECT SUBCUTANEOUSLY EVERY DAY Yes Natalia Faust, DO   Alcohol Swabs (DROPSAFE ALCOHOL PREP) 70 % PADS USE THREE TIMES DAILY Yes Natalia Faust DO   glucose monitoring (FREESTYLE FREEDOM) kit 1 kit by Does not apply route daily Yes Natalia Faust DO   blood glucose monitor strips Test 4 times a day & as needed for symptoms of irregular blood glucose. Dispense sufficient amount for indicated testing frequency plus additional to accommodate PRN testing needs.  Yes Natalia Faust DO   Lancets MISC 1 each by Does not apply route 4 times daily Yes Natalia Faust DO   TOUJEO SOLOSTAR 300 UNIT/ML SOPN INJECT 50 UNITS UNDER THE SKIN AT BEDTIME Yes Natalia Faust DO   atorvastatin (LIPITOR) 10 MG tablet TAKE 1 TABLET EVERY DAY Yes Natalia Faust DO   furosemide (LASIX) 40 MG tablet TAKE 1 TABLET EVERY DAY Yes Natalia Faust DO   alendronate (FOSAMAX) 70 MG tablet TAKE 1 TABLET EVERY 7 DAYS Yes Natalia Faust DO   potassium chloride (KLOR-CON M) 20 MEQ extended release tablet TAKE 1 TABLET EVERY DAY Yes Natalia Faust DO   levothyroxine (SYNTHROID) 50 MCG tablet Take 1 tablet by mouth daily Yes Natalia Faust DO   FERREX 150 FORTE 150-1-25 MG-MG-MCG CAPS capsule TAKE 1 CAPSULE BY MOUTH TWICE DAILY Yes Natalia Faust DO   acetaZOLAMIDE (DIAMOX) 500 MG extended release capsule  Yes Historical Provider, MD   TRAVATAN Z 0.004 % SOLN ophthalmic solution  Yes Historical Provider, MD   brimonidine (ALPHAGAN) 0.2 % ophthalmic solution Place 1 drop into both eyes 2 times daily Yes Natalia Faust DO   aspirin (ASPIRIN LOW DOSE) 81 MG EC tablet Take 1 tablet by mouth daily Yes Natalia Faust DO   dorzolamide-timolol (COSOPT) 22.3-6.8 MG/ML ophthalmic solution  Yes Historical Provider, MD   metoclopramide (REGLAN) 5 MG tablet TAKE 1 TABLET EVERY DAY  Patient not taking: No sig reported  Natalia Faust DO   Accu-Chek Softclix Lancets MISC TEST THREE TIMES DAILY  Patient not taking: No sig reported  Natalia Faust DO   blood glucose test strips (ACCU-CHEK MAMI PLUS) strip TEST THREE TIMES DAILY AS NEEDED  Patient not taking: No sig reported  Natalia Faust DO       CareTeam (Including outside providers/suppliers regularly involved in providing care):   Patient Care Team:  Isabel Dykes DO as PCP - General (Family Medicine)  [facility]  Jeff Vásquez DPM as Consulting Physician (Podiatry)     Reviewed and updated this visit:  Allergies  Meds  Problems  Med Hx  Surg Hx  Soc Hx  Fam Hx        1. Medicare annual wellness visit, subsequent    2. Type 2 diabetes mellitus with hyperglycemia, with long-term current use of insulin (HCC)    3. Edema of both legs    4. Pure hypercholesterolemia    5. Vitamin D deficiency    6. Acquired hypothyroidism    7. Iron deficiency anemia, unspecified iron deficiency anemia type    8. Gastroesophageal reflux disease with esophagitis without hemorrhage    9. Elevated BUN    10. Screening mammogram, encounter for    11. Other iron deficiency anemia    12. Osteopenia, unspecified location    13.  Screen for colon cancer      Orders Placed This Encounter   Procedures    Fecal DNA Colorectal cancer screening (Cologuard)    EULALIA DIGITAL SCREEN W OR WO CAD BILATERAL    CBC with Auto Differential    Comprehensive Metabolic Panel    Lipid Panel    T4, Free    TSH    Vitamin D 25 Hydroxy    Iron and TIBC    Vitamin B12 & Folate    POCT glycosylated hemoglobin (Hb A1C)     Requested Prescriptions      No prescriptions requested or ordered in this encounter

## 2022-09-30 NOTE — PATIENT INSTRUCTIONS
Personalized Preventive Plan for Dmitry Pelletier - 9/30/2022  Medicare offers a range of preventive health benefits. Some of the tests and screenings are paid in full while other may be subject to a deductible, co-insurance, and/or copay. Some of these benefits include a comprehensive review of your medical history including lifestyle, illnesses that may run in your family, and various assessments and screenings as appropriate. After reviewing your medical record and screening and assessments performed today your provider may have ordered immunizations, labs, imaging, and/or referrals for you. A list of these orders (if applicable) as well as your Preventive Care list are included within your After Visit Summary for your review. Other Preventive Recommendations:    A preventive eye exam performed by an eye specialist is recommended every 1-2 years to screen for glaucoma; cataracts, macular degeneration, and other eye disorders. A preventive dental visit is recommended every 6 months. Try to get at least 150 minutes of exercise per week or 10,000 steps per day on a pedometer . Order or download the FREE \"Exercise & Physical Activity: Your Everyday Guide\" from [employer]. Call 5-734.691.4308 or search [employer] online. You need 5963-3878 mg of calcium and 6202-9672 IU of vitamin D per day. It is possible to meet your calcium requirement with diet alone, but a vitamin D supplement is usually necessary to meet this goal.  When exposed to the sun, use a sunscreen that protects against both UVA and UVB radiation with an SPF of 30 or greater. Reapply every 2 to 3 hours or after sweating, drying off with a towel, or swimming. Always wear a seat belt when traveling in a car. Always wear a helmet when riding a bicycle or motorcycle.

## 2022-10-07 NOTE — DISCHARGE INSTRUCTIONS
[address] [facility],[address] -Phone: [phone] Fax: [phone]    Visit  Discharge Instructions / Physician Orders     DATE: 10/12/2022     Home Care: N/A     SUPPLIES ORDERED THRU: [employer]     Wound Location: Right Plantar Foot     Cleanse with: Liquid antibacterial soap and water, rinse well      Dressing Orders: [place] to wound, Silicone Border Dressing     Frequency: Daily     Additional Orders: Increase protein to diet (meat, cheese, eggs, fish, peanut butter, nuts and beans)  Multivitamin daily  ELEVATE LEGS AS MUCH AS POSSIBLE  6/29/22- X-Ray Ordered  6/29/22- Vascular Studies Ordered     Your next appointment with [facility] is in 2 weeks with Dr. Audrey Cunningham     (Please note your next appointment above and if you are unable to keep, kindly give a 24 hour notice. Thank you.)  If more than 15 min late we cannot guarantee you will be seen due to clinician schedule  Per Policy, Excessive cancellation will call for dismissal from program.     If you experience any of the following, please call the [facility] [place] [facility] [employer] during business hours:  [phone]  Your Phone call may be forwarded to [phone] during business hours that [facility] is closed. * Increase in Pain  * Temperature over 101  * Increase in drainage from your wound  * Drainage with a foul odor  * Bleeding  * Increase in swelling  * Need for compression bandage changes due to slippage, breakthrough drainage. If you need medical attention outside of the business hours of [facility] please contact your PCP or go to the nearest emergency room. The information contained in the After Visit Summary has been reviewed with me, the patient and/or responsible adult, by my health care provider(s). I had the opportunity to ask questions regarding this information.  I have elected to receive;      []After Visit Summary  [x]Comprehensive Discharge Instruction        Patient signature______________________________________Date:________  Electronically signed by Tigist Babb RN on 10/12/2022 at 8:42 AM  Electronically signed by Kiran Moore DPM on 10/12/2022 at 8:43 AM

## 2022-10-12 ENCOUNTER — HOSPITAL ENCOUNTER (OUTPATIENT)
Dept: WOUND CARE | Age: 72
Discharge: HOME | End: 2022-10-12
Payer: MEDICARE

## 2022-10-12 ENCOUNTER — HOSPITAL ENCOUNTER (OUTPATIENT)
Age: 72
Setting detail: SPECIMEN
Discharge: HOME | End: 2022-10-12
Payer: MEDICARE

## 2022-10-12 VITALS
RESPIRATION RATE: 18 BRPM | SYSTOLIC BLOOD PRESSURE: 108 MMHG | TEMPERATURE: 96.7 F | HEIGHT: 69 IN | BODY MASS INDEX: 24.29 KG/M2 | DIASTOLIC BLOOD PRESSURE: 53 MMHG | WEIGHT: 164 LBS | HEART RATE: 64 BPM

## 2022-10-12 DIAGNOSIS — D50.9 IRON DEFICIENCY ANEMIA, UNSPECIFIED IRON DEFICIENCY ANEMIA TYPE: ICD-10-CM

## 2022-10-12 DIAGNOSIS — E03.9 ACQUIRED HYPOTHYROIDISM: ICD-10-CM

## 2022-10-12 DIAGNOSIS — K21.00 GASTROESOPHAGEAL REFLUX DISEASE WITH ESOPHAGITIS WITHOUT HEMORRHAGE: ICD-10-CM

## 2022-10-12 DIAGNOSIS — M85.80 OSTEOPENIA, UNSPECIFIED LOCATION: ICD-10-CM

## 2022-10-12 DIAGNOSIS — R60.0 EDEMA OF BOTH LEGS: ICD-10-CM

## 2022-10-12 DIAGNOSIS — L97.512 RIGHT FOOT ULCER, WITH FAT LAYER EXPOSED (HCC): Primary | ICD-10-CM

## 2022-10-12 DIAGNOSIS — D50.8 OTHER IRON DEFICIENCY ANEMIA: ICD-10-CM

## 2022-10-12 DIAGNOSIS — R79.9 ELEVATED BUN: ICD-10-CM

## 2022-10-12 DIAGNOSIS — Z79.4 TYPE 2 DIABETES MELLITUS WITH HYPERGLYCEMIA, WITH LONG-TERM CURRENT USE OF INSULIN (HCC): ICD-10-CM

## 2022-10-12 DIAGNOSIS — E11.65 TYPE 2 DIABETES MELLITUS WITH HYPERGLYCEMIA, WITH LONG-TERM CURRENT USE OF INSULIN (HCC): ICD-10-CM

## 2022-10-12 DIAGNOSIS — E55.9 VITAMIN D DEFICIENCY: ICD-10-CM

## 2022-10-12 DIAGNOSIS — E78.00 PURE HYPERCHOLESTEROLEMIA: ICD-10-CM

## 2022-10-12 LAB
ABSOLUTE EOS #: 0.04 K/UL (ref 0–0.44)
ABSOLUTE IMMATURE GRANULOCYTE: <0.03 K/UL (ref 0–0.3)
ABSOLUTE LYMPH #: 2.15 K/UL (ref 1.1–3.7)
ABSOLUTE MONO #: 0.29 K/UL (ref 0.1–1.2)
ALBUMIN SERPL-MCNC: 4 G/DL (ref 3.5–5.2)
ALBUMIN/GLOBULIN RATIO: 1.7 (ref 1–2.5)
ALP BLD-CCNC: 76 U/L (ref 35–104)
ALT SERPL-CCNC: 18 U/L (ref 5–33)
ANION GAP SERPL CALCULATED.3IONS-SCNC: 10 MMOL/L (ref 9–17)
AST SERPL-CCNC: 19 U/L
BASOPHILS # BLD: 1 % (ref 0–2)
BASOPHILS ABSOLUTE: <0.03 K/UL (ref 0–0.2)
BILIRUB SERPL-MCNC: 0.8 MG/DL (ref 0.3–1.2)
BUN BLDV-MCNC: 21 MG/DL (ref 8–23)
CALCIUM SERPL-MCNC: 9 MG/DL (ref 8.6–10.4)
CHLORIDE BLD-SCNC: 106 MMOL/L (ref 98–107)
CHOLESTEROL/HDL RATIO: 2.4
CHOLESTEROL: 134 MG/DL
CO2: 26 MMOL/L (ref 20–31)
CREAT SERPL-MCNC: 0.96 MG/DL (ref 0.5–0.9)
EOSINOPHILS RELATIVE PERCENT: 1 % (ref 1–4)
FOLATE: 17.5 NG/ML
GFR SERPL CREATININE-BSD FRML MDRD: >60 ML/MIN/1.73M2
GLUCOSE BLD-MCNC: 81 MG/DL (ref 70–99)
HCT VFR BLD CALC: 33.3 % (ref 36.3–47.1)
HDLC SERPL-MCNC: 55 MG/DL
HEMOGLOBIN: 10.5 G/DL (ref 11.9–15.1)
IMMATURE GRANULOCYTES: 0 %
IRON SATURATION: 39 % (ref 20–55)
IRON: 77 UG/DL (ref 37–145)
LDL CHOLESTEROL: 61 MG/DL (ref 0–130)
LYMPHOCYTES # BLD: 57 % (ref 24–43)
MCH RBC QN AUTO: 28.5 PG (ref 25.2–33.5)
MCHC RBC AUTO-ENTMCNC: 31.5 G/DL (ref 28.4–34.8)
MCV RBC AUTO: 90.5 FL (ref 82.6–102.9)
MONOCYTES # BLD: 8 % (ref 3–12)
NRBC AUTOMATED: 0 PER 100 WBC
PDW BLD-RTO: 13 % (ref 11.8–14.4)
PLATELET # BLD: 147 K/UL (ref 138–453)
PMV BLD AUTO: 12.7 FL (ref 8.1–13.5)
POTASSIUM SERPL-SCNC: 3.8 MMOL/L (ref 3.7–5.3)
RBC # BLD: 3.68 M/UL (ref 3.95–5.11)
SEG NEUTROPHILS: 33 % (ref 36–65)
SEGMENTED NEUTROPHILS ABSOLUTE COUNT: 1.21 K/UL (ref 1.5–8.1)
SODIUM BLD-SCNC: 142 MMOL/L (ref 135–144)
THYROXINE, FREE: 1.02 NG/DL (ref 0.93–1.7)
TOTAL IRON BINDING CAPACITY: 196 UG/DL (ref 250–450)
TOTAL PROTEIN: 6.3 G/DL (ref 6.4–8.3)
TRIGL SERPL-MCNC: 88 MG/DL
TSH SERPL DL<=0.05 MIU/L-ACNC: 0.87 UIU/ML (ref 0.3–5)
UNSATURATED IRON BINDING CAPACITY: 119 UG/DL (ref 112–347)
VITAMIN B-12: 1226 PG/ML (ref 232–1245)
VITAMIN D 25-HYDROXY: 26.7 NG/ML
WBC # BLD: 3.7 K/UL (ref 3.5–11.3)

## 2022-10-12 PROCEDURE — 80053 COMPREHEN METABOLIC PANEL: CPT

## 2022-10-12 PROCEDURE — 83540 ASSAY OF IRON: CPT

## 2022-10-12 PROCEDURE — 85025 COMPLETE CBC W/AUTO DIFF WBC: CPT

## 2022-10-12 PROCEDURE — 84439 ASSAY OF FREE THYROXINE: CPT

## 2022-10-12 PROCEDURE — 82306 VITAMIN D 25 HYDROXY: CPT

## 2022-10-12 PROCEDURE — 36415 COLL VENOUS BLD VENIPUNCTURE: CPT

## 2022-10-12 PROCEDURE — 83550 IRON BINDING TEST: CPT

## 2022-10-12 PROCEDURE — 80061 LIPID PANEL: CPT

## 2022-10-12 PROCEDURE — 82607 VITAMIN B-12: CPT

## 2022-10-12 PROCEDURE — 84443 ASSAY THYROID STIM HORMONE: CPT

## 2022-10-12 PROCEDURE — 82746 ASSAY OF FOLIC ACID SERUM: CPT

## 2022-10-12 PROCEDURE — 11042 DBRDMT SUBQ TIS 1ST 20SQCM/<: CPT

## 2022-10-12 RX ORDER — BACITRACIN ZINC AND POLYMYXIN B SULFATE 500; 1000 [USP'U]/G; [USP'U]/G
OINTMENT TOPICAL ONCE
OUTPATIENT
Start: 2022-10-12 | End: 2022-10-12

## 2022-10-12 RX ORDER — LIDOCAINE 50 MG/G
OINTMENT TOPICAL ONCE
OUTPATIENT
Start: 2022-10-12 | End: 2022-10-12

## 2022-10-12 RX ORDER — CLOBETASOL PROPIONATE 0.5 MG/G
OINTMENT TOPICAL ONCE
OUTPATIENT
Start: 2022-10-12 | End: 2022-10-12

## 2022-10-12 RX ORDER — GENTAMICIN SULFATE 1 MG/G
OINTMENT TOPICAL ONCE
OUTPATIENT
Start: 2022-10-12 | End: 2022-10-12

## 2022-10-12 RX ORDER — BACITRACIN, NEOMYCIN, POLYMYXIN B 400; 3.5; 5 [USP'U]/G; MG/G; [USP'U]/G
OINTMENT TOPICAL ONCE
OUTPATIENT
Start: 2022-10-12 | End: 2022-10-12

## 2022-10-12 RX ORDER — GINSENG 100 MG
CAPSULE ORAL ONCE
OUTPATIENT
Start: 2022-10-12 | End: 2022-10-12

## 2022-10-12 RX ORDER — LIDOCAINE 40 MG/G
CREAM TOPICAL ONCE
OUTPATIENT
Start: 2022-10-12 | End: 2022-10-12

## 2022-10-12 RX ORDER — LIDOCAINE HYDROCHLORIDE 20 MG/ML
JELLY TOPICAL ONCE
OUTPATIENT
Start: 2022-10-12 | End: 2022-10-12

## 2022-10-12 RX ORDER — LIDOCAINE HYDROCHLORIDE 20 MG/ML
JELLY TOPICAL ONCE
Status: COMPLETED | OUTPATIENT
Start: 2022-10-12 | End: 2022-10-12

## 2022-10-12 RX ORDER — LIDOCAINE HYDROCHLORIDE 20 MG/ML
JELLY TOPICAL ONCE
Status: CANCELLED | OUTPATIENT
Start: 2022-10-12 | End: 2022-10-12

## 2022-10-12 RX ORDER — LIDOCAINE HYDROCHLORIDE 40 MG/ML
SOLUTION TOPICAL ONCE
OUTPATIENT
Start: 2022-10-12 | End: 2022-10-12

## 2022-10-12 RX ORDER — BETAMETHASONE DIPROPIONATE 0.05 %
OINTMENT (GRAM) TOPICAL ONCE
OUTPATIENT
Start: 2022-10-12 | End: 2022-10-12

## 2022-10-12 RX ADMIN — LIDOCAINE HYDROCHLORIDE 6 ML: 20 JELLY TOPICAL at 08:23

## 2022-10-12 NOTE — PLAN OF CARE
Problem: Chronic Conditions and Co-morbidities  Goal: Patient's chronic conditions and co-morbidity symptoms are monitored and maintained or improved  Outcome: Progressing     Problem: Discharge Planning  Goal: Discharge to home or other facility with appropriate resources  Outcome: Progressing     Problem: Safety - Adult  Goal: Free from fall injury  Outcome: Progressing     Problem: ABCDS Injury Assessment  Goal: Absence of physical injury  Outcome: Progressing  Flowsheets (Taken 10/12/2022 0814 by Toro Boyd RN)  Absence of Physical Injury: Implement safety measures based on patient assessment     Problem: Wound:  Goal: Will show signs of wound healing; wound closure and no evidence of infection  Description: Will show signs of wound healing; wound closure and no evidence of infection  Outcome: Progressing     Problem: Falls - Risk of:  Goal: Will remain free from falls  Description: Will remain free from falls  Outcome: Progressing

## 2022-10-12 NOTE — PROGRESS NOTES
[address]   Progress Note and Procedure Note      Freda Barber  MEDICAL RECORD NUMBER:  [id number]  AGE: 81 y.o. GENDER: female  : 1950  EPISODE DATE:  10/12/2022    Subjective:     Chief Complaint   Patient presents with    Wound Check     Right foot         HISTORY of PRESENT ILLNESS HPI     Freda Barber is a 81 y.o. female who presents today for wound/ulcer evaluation. Current evaluation:   Rona Cheatham presents in her [address]. No sign of infection. Does not want to use the total contact cast; wishes to continue with CROW for offloading. Interval history:  XR was negative for sign of osteomyelitis. Rona Cheatham has scheduled the noninvasive vascular testing for . States she has an appointment tomorrow for [facility]. States that she is not able to use a knee scooter, crutches, or a walker to maintain non-weightbearing to the right lower extremity. History of Wound Context: Rona Cheatham presents for evaluation of plantar right foot ulceration. She states that it has been open for about 2 months now. She was seeing a podiatrist for this and was referred to the wound care clinic. She presents in a Crow on the right and a custom diabetic shoe on the left. She does have a history of Charcot. Has not had any sign or symptom of infection.     Ulcer Identification:  Ulcer Type: diabetic and neuropathic  Contributing Factors: edema, lymphedema, diabetes, chronic pressure and shear force          PAST MEDICAL HISTORY        Diagnosis Date    Diabetic polyneuropathy associated with type 1 diabetes mellitus ([address] 3/27/2019    Diabetic ulcer of left foot associated with diabetes mellitus due to underlying condition, with fat layer exposed ([address] 3/27/2019    Type 2 diabetes mellitus with hyperglycemia, with long-term current use of insulin ([address] 2018    Type II or unspecified type diabetes mellitus without mention of complication, not stated as uncontrolled        PAST SURGICAL HISTORY    Past Surgical History:   Procedure Laterality Date    FOOT SURGERY  +10years    R foot        FAMILY HISTORY    Family History   Problem Relation Age of Onset    Diabetes Mother     Diabetes Brother        SOCIAL HISTORY    Social History     Tobacco Use    Smoking status: Never    Smokeless tobacco: Never   Vaping Use    Vaping Use: Never used   Substance Use Topics    Alcohol use: No    Drug use: No       ALLERGIES    No Known Allergies    MEDICATIONS    Current Outpatient Medications on File Prior to Encounter   Medication Sig Dispense Refill    DROPLET PEN NEEDLES 32G X 4 MM MISC USE TO INJECT SUBCUTANEOUSLY EVERY  each 3    Alcohol Swabs (DROPSAFE ALCOHOL PREP) 70 % PADS USE THREE TIMES DAILY 300 each 5    glucose monitoring (FREESTYLE FREEDOM) kit 1 kit by Does not apply route daily 1 kit 0    blood glucose monitor strips Test 4 times a day & as needed for symptoms of irregular blood glucose. Dispense sufficient amount for indicated testing frequency plus additional to accommodate PRN testing needs.  300 strip 0    Lancets MISC 1 each by Does not apply route 4 times daily 100 each 5    TOUJEO SOLOSTAR 300 UNIT/ML SOPN INJECT 50 UNITS UNDER THE SKIN AT BEDTIME 5 pen 5    atorvastatin (LIPITOR) 10 MG tablet TAKE 1 TABLET EVERY DAY 90 tablet 3    metoclopramide (REGLAN) 5 MG tablet TAKE 1 TABLET EVERY DAY (Patient not taking: No sig reported) 90 tablet 1    furosemide (LASIX) 40 MG tablet TAKE 1 TABLET EVERY DAY 90 tablet 1    alendronate (FOSAMAX) 70 MG tablet TAKE 1 TABLET EVERY 7 DAYS 12 tablet 3    Accu-Chek Softclix Lancets MISC TEST THREE TIMES DAILY (Patient not taking: No sig reported) 300 each 3    blood glucose test strips (ACCU-CHEK MAMI PLUS) strip TEST THREE TIMES DAILY AS NEEDED (Patient not taking: No sig reported) 300 strip 3    potassium chloride (KLOR-CON M) 20 MEQ extended release tablet TAKE 1 TABLET EVERY DAY 90 tablet 1    levothyroxine (SYNTHROID) 50 MCG tablet Take 1 tablet by mouth daily 30 tablet 2    FERREX 150 FORTE 150-1-25 MG-MG-MCG CAPS capsule TAKE 1 CAPSULE BY MOUTH TWICE DAILY 30 capsule 11    acetaZOLAMIDE (DIAMOX) 500 MG extended release capsule       TRAVATAN Z 0.004 % SOLN ophthalmic solution       brimonidine (ALPHAGAN) 0.2 % ophthalmic solution Place 1 drop into both eyes 2 times daily 1 Bottle 0    aspirin (ASPIRIN LOW DOSE) 81 MG EC tablet Take 1 tablet by mouth daily 30 tablet 12    dorzolamide-timolol (COSOPT) 22.3-6.8 MG/ML ophthalmic solution        No current facility-administered medications on file prior to encounter. REVIEW OF SYSTEMS    Review of Systems   Constitutional:  Negative for chills and fever. Skin:  Positive for wound. Objective:      BP (!) 108/53   Pulse 64   Temp (!) 96.7 °F (35.9 °C) (Tympanic)   Resp 18   Ht 5' 9\" (1.753 m)   Wt 164 lb (74.4 kg)   BMI 24.22 kg/m²     Wt Readings from Last 3 Encounters:   10/12/22 164 lb (74.4 kg)   09/30/22 164 lb (74.4 kg)   09/14/22 169 lb (76.7 kg)       Physical Exam:  General:  Alert and oriented x3. In no acute distress. Lower Extremity Physical Exam:    Vascular: DP pulse on the left is palpable and not palpable on the right. PT pulse on the right is palpable and not palpable on the left. CFT <3 seconds to all digits, Bilateral.  Pitting edema, Bilateral.  Hair growth is absent to the level of the lower leg, Bilateral.     Neuro: Saph/sural/SP/DP/plantar sensation absent to light touch. Protective sensation is intact to  0 /10 sites as tested with a 5.07g SWMF, Bilateral.     Musculoskeletal: EHL/FHL/GS/TA gross motor intact. Gross deformity is present, rocker bottom deformity right foot with prominence at the site of ulceration. Dermatologic: Open wound present to plantar right midfoot as documented in detail below. Wound base is fibrotic with slough. Leda-wound skin is hyperkeratotic. Negative probe to bone. There is no erythema. There is no purulent drainage. There is no fluctuance or crepitus. Interdigital maceration absent, Bilateral.       Assessment:      Active Hospital Problems    Diagnosis Date Noted    Right foot ulcer, with fat layer exposed [facility]) [L97.512] 06/29/2022     Priority: Medium    Charcot's joint of foot, right [M14.671] 06/29/2022     Priority: Medium    Diabetic polyneuropathy associated with type 2 diabetes mellitus ([facility] [E11.42] 03/27/2019       Plan:     Treatment Note please see attached Discharge Instructions    Discussed the importance of offloading for healing of a plantar neuropathic ulceration. Discussed NWB vs total contact cast.  Recommended TCC again today. She wishes to hold off on TCC at this time and continue with CROW. Recommended that she limit ambulation as much as possible in the meantime. Wear CROW at all times while walking. Coty to the wound base daily    Educated on signs and symptoms of infection. Instructed to call clinic immediately or go to ER if signs and symptoms of infection are present. RTC 1 week       Procedure Note  Indications:  Based on my examination of this patient's wound(s)/ulcer(s) today, debridement is required to promote healing and evaluate the wound base. Performed by: Chace Travis DPM    Consent obtained:  Yes    Time out taken:  Yes    Pain Control: Anesthetic  Anesthetic: 2% Lidocaine Gel Topical       Debridement: Excisional Debridement    Using curette and tissue nippers the wound(s)/ulcer(s) was/were sharply debrided down through and including the removal of subcutaneous tissue.         Devitalized Tissue Debrided:  fibrin, biofilm, slough and callus    Pre Debridement Measurements:  Are located in the [facility]  Documentation Flow Sheet    Wound/Ulcer #: 1    Post Debridement Measurements:  Wound/Ulcer Descriptions are Pre Debridement except measurements:    Wound 06/29/22 Foot Right;Plantar #1 (Active)   Wound Image   09/28/22 0882   Wound Etiology Diabetic [place] 2 10/12/22 2753   Dressing Status New drainage noted; Old drainage noted 10/12/22 0819   Wound Cleansed Cleansed with saline 10/12/22 0819   Offloading for Diabetic Foot Ulcers Offloading ordered; Offloading boot 10/12/22 0819   Wound Length (cm) 0.5 cm 10/12/22 0819   Wound Width (cm) 0.7 cm 10/12/22 0819   Wound Depth (cm) 0.2 cm 10/12/22 0819   Wound Surface Area (cm^2) 0.35 cm^2 10/12/22 0819   Change in Wound Size % (l*w) 88.49 10/12/22 0819   Wound Volume (cm^3) 0.07 cm^3 10/12/22 0819   Wound Healing % 96 10/12/22 0819   Post-Procedure Length (cm) 1.1 cm 10/12/22 0819   Post-Procedure Width (cm) 0.7 cm 10/12/22 0819   Post-Procedure Depth (cm) 0.2 cm 10/12/22 0819   Post-Procedure Surface Area (cm^2) 0.77 cm^2 10/12/22 0819   Post-Procedure Volume (cm^3) 0.154 cm^3 10/12/22 0819   Wound Assessment Pink/red 10/12/22 0819   Drainage Amount Moderate 10/12/22 0819   Drainage Description Serosanguinous; Yellow 10/12/22 0819   Odor Mild 10/12/22 0819   Leda-wound Assessment Hyperkeratosis (callous); Maceration 10/12/22 0819   Margins Defined edges 10/12/22 0819   Wound Thickness Description not for Pressure Injury Full thickness 10/12/22 0819   Number of days: 105          Percent of Wound(s)/Ulcer(s) Debrided: 100%    Total Surface Area Debrided:  0.77 sq cm     Diabetic/Pressure/Non Pressure Ulcers only:  Ulcer: Diabetic ulcer, fat layer exposed     Estimated Blood Loss:  Estimated amount of blood loss is 2ml. Hemostasis Achieved:  by pressure    Response to treatment:  Well tolerated by patient. Written patient discharge instructions given to patient and signed by patient or POA.       Orders Placed This Encounter   Medications    lidocaine (XYLOCAINE) 2 % uro-jet     Orders Placed This Encounter   Procedures    Initiate Outpatient Wound Care Protocol     Cleanse wound with saline    If wound contains bioburden or contamination cleanse with wound cleanser or antimicrobial solution For normal periwound tissue without irritation nor maceration, apply topical skin protectant    For periwound tissue with irritation and/or maceration, apply zinc based product, topical steroid cream/ointment, or equivalent     For wounds with dry firm black eschar and/or without exudate, apply betadine and leave open to air      For wounds with scant/small to no exudate or drainage, apply wound gel, hydrocolloid, polymer, or equivalent and cover with secondary dressing/foam      For wounds with moderate/large exudate or drainage, apply alginate, hydrofiber, polymer, or equivalent and cover with secondary dressing/foam    For wounds with nonviable tissue requiring removal, apply chemical or mechanical debrider and cover with secondary dressing/foam    For wounds with tunneling, dead space, or cavity, fill or pack with strip/gauze/kerlex to fit and cover with secondary dressing/foam    For wounds with adequate granulation or epithelization, apply wound gel, hydrocolloid, polymer, collagen, or transparent film, and cover secondary dry dressing/foam    For wounds that need additional secondary dressing to help pad or control additional drainage/exudates, add foam, absorbent pad or hydrocolloid    For wounds with suspected or known infection, apply antimicrobial mesh and/or antimicrobial alginate/hydrofiber, or antimicrobial solution moistened gauze/kerlex, or equivalent and cover with secondary dressing/foam    Compression Management needed for edema control, apply multilayer compression or tubular garment or equivalent    Offloading Management needed for pressure relief, apply offloading shoe/boot or equivalent     Standing Status:   Standing     Number of Occurrences:   1          Discharge Instructions            [address] [facility],[address] -Phone: [phone] Fax: [phone]    Visit  Discharge Instructions / Physician Orders     DATE: 10/12/2022     Home Care: N/A     SUPPLIES ORDERED THRU: [employer]     Wound Location: Right Plantar Foot     Cleanse with: Liquid antibacterial soap and water, rinse well      Dressing Orders: Coty to wound, Silicone Border Dressing     Frequency: Daily     Additional Orders: Increase protein to diet (meat, cheese, eggs, fish, peanut butter, nuts and beans)  Multivitamin daily  ELEVATE LEGS AS MUCH AS POSSIBLE  6/29/22- X-Ray Ordered  6/29/22- Vascular Studies Ordered     Your next appointment with [facility] is in 2 weeks with Dr. Shireen Quintana     (Please note your next appointment above and if you are unable to keep, kindly give a 24 hour notice. Thank you.)  If more than 15 min late we cannot guarantee you will be seen due to clinician schedule  Per Policy, Excessive cancellation will call for dismissal from program.     If you experience any of the following, please call the [facility] during business hours:  [phone]  Your Phone call may be forwarded to [email or web address] during business hours that [facility] is closed. * Increase in Pain  * Temperature over 101  * Increase in drainage from your wound  * Drainage with a foul odor  * Bleeding  * Increase in swelling  * Need for compression bandage changes due to slippage, breakthrough drainage. If you need medical attention outside of the business hours of the [facility] please contact your PCP or go to the nearest emergency room. The information contained in the After Visit Summary has been reviewed with me, the patient and/or responsible adult, by my health care provider(s). I had the opportunity to ask questions regarding this information.  I have elected to receive;      []After Visit Summary  [x]Comprehensive Discharge Instruction        Patient signature______________________________________Date:________  Electronically signed by Nancy Barrera RN on 10/12/2022 at 8:42 AM  Electronically signed by Mamadou Barber DPM on 10/12/2022 at 8:43 AM          Electronically signed by Jermaine Tan DPM on 10/12/2022 at 8:44 AM

## 2022-10-24 NOTE — DISCHARGE INSTRUCTIONS
[address] [facility],[address] -Phone: [phone] Fax: [phone]    Visit  Discharge Instructions / Physician Orders     DATE: 10/26/2022     Home Care: N/A     SUPPLIES ORDERED THRU: [employer]     Wound Location: Right Plantar Foot     Cleanse with: Liquid antibacterial soap and water, rinse well      Dressing Orders: Coty to wound, Silicone Border Dressing     Frequency: Daily     Additional Orders: Increase protein to diet (meat, cheese, eggs, fish, peanut butter, nuts and beans)  Multivitamin daily  ELEVATE LEGS AS MUCH AS POSSIBLE  6/29/22- X-Ray Ordered  6/29/22- Vascular Studies Ordered     Your next appointment with [facility] is in 2 weeks with Dr. Michelle Collazo     (Please note your next appointment above and if you are unable to keep, kindly give a 24 hour notice. Thank you.)  If more than 15 min late we cannot guarantee you will be seen due to clinician schedule  Per Policy, Excessive cancellation will call for dismissal from program.     If you experience any of the following, please call the [facility] during business hours:  [phone]  Your Phone call may be forwarded to [email or web address] during business hours that [facility] is closed. * Increase in Pain  * Temperature over 101  * Increase in drainage from your wound  * Drainage with a foul odor  * Bleeding  * Increase in swelling  * Need for compression bandage changes due to slippage, breakthrough drainage. If you need medical attention outside of the business hours of the [facility] please contact your PCP or go to the nearest emergency room. The information contained in the After Visit Summary has been reviewed with me, the patient and/or responsible adult, by my health care provider(s). I had the opportunity to ask questions regarding this information.  I have elected to receive;      []After Visit Summary  [x]Comprehensive Discharge Instruction        Patient signature______________________________________Date:________  Electronically signed by Jessica Brannon RN on 10/26/2022 at 8:32 AM  Electronically signed by Jermaine Tan DPM on 10/26/2022 at 8:22 AM

## 2022-10-26 ENCOUNTER — HOSPITAL ENCOUNTER (OUTPATIENT)
Dept: WOUND CARE | Age: 72
Discharge: HOME | End: 2022-10-26
Payer: MEDICARE

## 2022-10-26 ENCOUNTER — HOSPITAL ENCOUNTER (OUTPATIENT)
Dept: MAMMOGRAPHY | Age: 72
Discharge: HOME | End: 2022-10-28
Payer: MEDICARE

## 2022-10-26 VITALS
HEIGHT: 69 IN | RESPIRATION RATE: 19 BRPM | HEART RATE: 79 BPM | WEIGHT: 164 LBS | SYSTOLIC BLOOD PRESSURE: 142 MMHG | TEMPERATURE: 96.6 F | BODY MASS INDEX: 24.29 KG/M2 | DIASTOLIC BLOOD PRESSURE: 55 MMHG

## 2022-10-26 DIAGNOSIS — Z12.31 SCREENING MAMMOGRAM, ENCOUNTER FOR: ICD-10-CM

## 2022-10-26 DIAGNOSIS — L97.512 RIGHT FOOT ULCER, WITH FAT LAYER EXPOSED (HCC): Primary | ICD-10-CM

## 2022-10-26 PROCEDURE — 11042 DBRDMT SUBQ TIS 1ST 20SQCM/<: CPT

## 2022-10-26 PROCEDURE — 77063 BREAST TOMOSYNTHESIS BI: CPT

## 2022-10-26 RX ORDER — LIDOCAINE HYDROCHLORIDE 20 MG/ML
JELLY TOPICAL ONCE
Status: CANCELLED | OUTPATIENT
Start: 2022-10-26 | End: 2022-10-26

## 2022-10-26 RX ORDER — LIDOCAINE 50 MG/G
OINTMENT TOPICAL ONCE
Status: CANCELLED | OUTPATIENT
Start: 2022-10-26 | End: 2022-10-26

## 2022-10-26 RX ORDER — CLOBETASOL PROPIONATE 0.5 MG/G
OINTMENT TOPICAL ONCE
Status: CANCELLED | OUTPATIENT
Start: 2022-10-26 | End: 2022-10-26

## 2022-10-26 RX ORDER — LIDOCAINE 40 MG/G
CREAM TOPICAL ONCE
Status: CANCELLED | OUTPATIENT
Start: 2022-10-26 | End: 2022-10-26

## 2022-10-26 RX ORDER — BACITRACIN, NEOMYCIN, POLYMYXIN B 400; 3.5; 5 [USP'U]/G; MG/G; [USP'U]/G
OINTMENT TOPICAL ONCE
Status: CANCELLED | OUTPATIENT
Start: 2022-10-26 | End: 2022-10-26

## 2022-10-26 RX ORDER — GENTAMICIN SULFATE 1 MG/G
OINTMENT TOPICAL ONCE
Status: CANCELLED | OUTPATIENT
Start: 2022-10-26 | End: 2022-10-26

## 2022-10-26 RX ORDER — LIDOCAINE HYDROCHLORIDE 40 MG/ML
SOLUTION TOPICAL ONCE
Status: CANCELLED | OUTPATIENT
Start: 2022-10-26 | End: 2022-10-26

## 2022-10-26 RX ORDER — LIDOCAINE HYDROCHLORIDE 20 MG/ML
JELLY TOPICAL ONCE
Status: COMPLETED | OUTPATIENT
Start: 2022-10-26 | End: 2022-10-26

## 2022-10-26 RX ORDER — GINSENG 100 MG
CAPSULE ORAL ONCE
Status: CANCELLED | OUTPATIENT
Start: 2022-10-26 | End: 2022-10-26

## 2022-10-26 RX ORDER — BETAMETHASONE DIPROPIONATE 0.05 %
OINTMENT (GRAM) TOPICAL ONCE
Status: CANCELLED | OUTPATIENT
Start: 2022-10-26 | End: 2022-10-26

## 2022-10-26 RX ORDER — BACITRACIN ZINC AND POLYMYXIN B SULFATE 500; 1000 [USP'U]/G; [USP'U]/G
OINTMENT TOPICAL ONCE
Status: CANCELLED | OUTPATIENT
Start: 2022-10-26 | End: 2022-10-26

## 2022-10-26 RX ADMIN — LIDOCAINE HYDROCHLORIDE 6 ML: 20 JELLY TOPICAL at 08:22

## 2022-10-26 NOTE — PROGRESS NOTES
[address]   Progress Note and Procedure Note      Elisabet Chandler  MEDICAL RECORD NUMBER:  [id number]  AGE: 81 y.o. GENDER: female  : 1950  EPISODE DATE:  10/26/2022    Subjective:     Chief Complaint   Patient presents with    Wound Check     Right foot         HISTORY of PRESENT ILLNESS HPI     Elisabet Chandler is a 81 y.o. female who presents today for wound/ulcer evaluation. Current evaluation:   Jeanine Davis presents in her [address]. No sign of infection. Does not want to use the total contact cast; wishes to continue with CROW for offloading. Has been improving. Interval history:  XR was negative for sign of osteomyelitis. Jeanine Davis has scheduled the noninvasive vascular testing for . States she has an appointment tomorrow for [facility]. States that she is not able to use a knee scooter, crutches, or a walker to maintain non-weightbearing to the right lower extremity. History of Wound Context: Jeanine Davis presents for evaluation of plantar right foot ulceration. She states that it has been open for about 2 months now. She was seeing a podiatrist for this and was referred to the wound care clinic. She presents in a Crow on the right and a custom diabetic shoe on the left. She does have a history of Charcot. Has not had any sign or symptom of infection.     Ulcer Identification:  Ulcer Type: diabetic and neuropathic  Contributing Factors: edema, lymphedema, diabetes, chronic pressure and shear force          PAST MEDICAL HISTORY        Diagnosis Date    Diabetic polyneuropathy associated with type 1 diabetes mellitus ([address] 3/27/2019    Diabetic ulcer of left foot associated with diabetes mellitus due to underlying condition, with fat layer exposed ([address] 3/27/2019    Type 2 diabetes mellitus with hyperglycemia, with long-term current use of insulin ([address] 2018    Type II or unspecified type diabetes mellitus without mention of complication, not stated as uncontrolled        PAST SURGICAL HISTORY    Past Surgical History:   Procedure Laterality Date    FOOT SURGERY  +10years    R foot        FAMILY HISTORY    Family History   Problem Relation Age of Onset    Diabetes Mother     Diabetes Brother        SOCIAL HISTORY    Social History     Tobacco Use    Smoking status: Never    Smokeless tobacco: Never   Vaping Use    Vaping Use: Never used   Substance Use Topics    Alcohol use: No    Drug use: No       ALLERGIES    No Known Allergies    MEDICATIONS    Current Outpatient Medications on File Prior to Encounter   Medication Sig Dispense Refill    DROPLET PEN NEEDLES 32G X 4 MM MISC USE TO INJECT SUBCUTANEOUSLY EVERY  each 3    Alcohol Swabs (DROPSAFE ALCOHOL PREP) 70 % PADS USE THREE TIMES DAILY 300 each 5    glucose monitoring (FREESTYLE FREEDOM) kit 1 kit by Does not apply route daily 1 kit 0    blood glucose monitor strips Test 4 times a day & as needed for symptoms of irregular blood glucose. Dispense sufficient amount for indicated testing frequency plus additional to accommodate PRN testing needs.  300 strip 0    Lancets MISC 1 each by Does not apply route 4 times daily 100 each 5    TOUJEO SOLOSTAR 300 UNIT/ML SOPN INJECT 50 UNITS UNDER THE SKIN AT BEDTIME 5 pen 5    atorvastatin (LIPITOR) 10 MG tablet TAKE 1 TABLET EVERY DAY 90 tablet 3    metoclopramide (REGLAN) 5 MG tablet TAKE 1 TABLET EVERY DAY (Patient not taking: No sig reported) 90 tablet 1    furosemide (LASIX) 40 MG tablet TAKE 1 TABLET EVERY DAY 90 tablet 1    alendronate (FOSAMAX) 70 MG tablet TAKE 1 TABLET EVERY 7 DAYS 12 tablet 3    Accu-Chek Softclix Lancets MISC TEST THREE TIMES DAILY (Patient not taking: No sig reported) 300 each 3    blood glucose test strips (ACCU-CHEK MAMI PLUS) strip TEST THREE TIMES DAILY AS NEEDED (Patient not taking: No sig reported) 300 strip 3    potassium chloride (KLOR-CON M) 20 MEQ extended release tablet TAKE 1 TABLET EVERY DAY 90 tablet 1    levothyroxine (SYNTHROID) 50 MCG tablet Take 1 tablet by mouth daily 30 tablet 2    FERREX 150 FORTE 150-1-25 MG-MG-MCG CAPS capsule TAKE 1 CAPSULE BY MOUTH TWICE DAILY 30 capsule 11    acetaZOLAMIDE (DIAMOX) 500 MG extended release capsule       TRAVATAN Z 0.004 % SOLN ophthalmic solution       brimonidine (ALPHAGAN) 0.2 % ophthalmic solution Place 1 drop into both eyes 2 times daily 1 Bottle 0    aspirin (ASPIRIN LOW DOSE) 81 MG EC tablet Take 1 tablet by mouth daily 30 tablet 12    dorzolamide-timolol (COSOPT) 22.3-6.8 MG/ML ophthalmic solution        No current facility-administered medications on file prior to encounter. REVIEW OF SYSTEMS    Review of Systems   Constitutional:  Negative for chills and fever. Skin:  Positive for wound. Objective:      BP (!) 142/55   Pulse 79   Temp (!) 96.6 °F (35.9 °C) (Tympanic)   Resp 19   Ht 5' 9\" (1.753 m)   Wt 164 lb (74.4 kg)   BMI 24.22 kg/m²     Wt Readings from Last 3 Encounters:   10/26/22 164 lb (74.4 kg)   10/12/22 164 lb (74.4 kg)   09/30/22 164 lb (74.4 kg)       Physical Exam:  General:  Alert and oriented x3. In no acute distress. Lower Extremity Physical Exam:    Vascular: DP pulse on the left is palpable and not palpable on the right. PT pulse on the right is palpable and not palpable on the left. CFT <3 seconds to all digits, Bilateral.  Pitting edema, Bilateral.  Hair growth is absent to the level of the lower leg, Bilateral.     Neuro: Saph/sural/SP/DP/plantar sensation absent to light touch. Protective sensation is intact to  0 /10 sites as tested with a 5.07g SWMF, Bilateral.     Musculoskeletal: EHL/FHL/GS/TA gross motor intact. Gross deformity is present, rocker bottom deformity right foot with prominence at the site of ulceration. Dermatologic: Open wound present to plantar right midfoot as documented in detail below. Wound base is fibrotic with slough. Leda-wound skin is hyperkeratotic.   Negative probe to bone.  There is no erythema. There is no purulent drainage. There is no fluctuance or crepitus. Interdigital maceration absent, Bilateral.       Assessment:      Active Hospital Problems    Diagnosis Date Noted    Right foot ulcer, with fat layer exposed [facility]) [L97.512] 06/29/2022     Priority: Medium    Charcot's joint of foot, right [M14.671] 06/29/2022     Priority: Medium    Diabetic polyneuropathy associated with type 2 diabetes mellitus ([facility] [E11.42] 03/27/2019       Plan:     Treatment Note please see attached Discharge Instructions    Discussed the importance of offloading for healing of a plantar neuropathic ulceration. Discussed NWB vs total contact cast.  Recommended TCC again today. She wishes to hold off on TCC at this time and continue with CROW. Recommended that she limit ambulation as much as possible in the meantime. Wear CROW at all times while walking. Coty to the wound base daily    Educated on signs and symptoms of infection. Instructed to call clinic immediately or go to ER if signs and symptoms of infection are present. RTC 1 week       Procedure Note  Indications:  Based on my examination of this patient's wound(s)/ulcer(s) today, debridement is required to promote healing and evaluate the wound base. Performed by: Justine Mandel DPM    Consent obtained:  Yes    Time out taken:  Yes    Pain Control: Anesthetic  Anesthetic: 2% Lidocaine Gel Topical       Debridement: Excisional Debridement    Using curette and tissue nippers the wound(s)/ulcer(s) was/were sharply debrided down through and including the removal of subcutaneous tissue.         Devitalized Tissue Debrided:  fibrin, biofilm, slough and callus    Pre Debridement Measurements:  Are located in the [facility]  Documentation Flow Sheet    Wound/Ulcer #: 1    Post Debridement Measurements:  Wound/Ulcer Descriptions are Pre Debridement except measurements:    Wound 06/29/22 Foot Right;Plantar #1 (Active)   Wound Image   09/28/22 0830   Wound Etiology Diabetic [place] 2 10/26/22 0825   Dressing Status New drainage noted; Old drainage noted 10/26/22 0825   Wound Cleansed Cleansed with saline 10/26/22 0825   Offloading for Diabetic Foot Ulcers Offloading ordered; Offloading boot 10/26/22 0825   Wound Length (cm) 0.4 cm 10/26/22 0825   Wound Width (cm) 0.4 cm 10/26/22 0825   Wound Depth (cm) 0.2 cm 10/26/22 0825   Wound Surface Area (cm^2) 0.16 cm^2 10/26/22 0825   Change in Wound Size % (l*w) 94.74 10/26/22 0825   Wound Volume (cm^3) 0.032 cm^3 10/26/22 0825   Wound Healing % 98 10/26/22 0825   Post-Procedure Length (cm) 0.6 cm 10/26/22 0825   Post-Procedure Width (cm) 0.7 cm 10/26/22 0825   Post-Procedure Depth (cm) 0.2 cm 10/26/22 0825   Post-Procedure Surface Area (cm^2) 0.42 cm^2 10/26/22 0825   Post-Procedure Volume (cm^3) 0.084 cm^3 10/26/22 0825   Wound Assessment Pink/red 10/26/22 0825   Drainage Amount Moderate 10/26/22 0825   Drainage Description Serosanguinous; Yellow 10/26/22 0825   Odor None 10/26/22 0825   Leda-wound Assessment Hyperkeratosis (callous); Maceration 10/26/22 0825   Margins Defined edges 10/26/22 0825   Wound Thickness Description not for Pressure Injury Full thickness 10/26/22 0825   Number of days: 118          Percent of Wound(s)/Ulcer(s) Debrided: 100%    Total Surface Area Debrided:  0.42 sq cm     Diabetic/Pressure/Non Pressure Ulcers only:  Ulcer: Diabetic ulcer, fat layer exposed     Estimated Blood Loss:  Estimated amount of blood loss is 2ml. Hemostasis Achieved:  by pressure    Response to treatment:  Well tolerated by patient. Written patient discharge instructions given to patient and signed by patient or POA.       Orders Placed This Encounter   Medications    lidocaine (XYLOCAINE) 2 % uro-jet     Orders Placed This Encounter   Procedures    Initiate Outpatient Wound Care Protocol     Cleanse wound with saline    If wound contains bioburden or contamination cleanse with wound cleanser or antimicrobial solution     For normal periwound tissue without irritation nor maceration, apply topical skin protectant    For periwound tissue with irritation and/or maceration, apply zinc based product, topical steroid cream/ointment, or equivalent     For wounds with dry firm black eschar and/or without exudate, apply betadine and leave open to air      For wounds with scant/small to no exudate or drainage, apply wound gel, hydrocolloid, polymer, or equivalent and cover with secondary dressing/foam      For wounds with moderate/large exudate or drainage, apply alginate, hydrofiber, polymer, or equivalent and cover with secondary dressing/foam    For wounds with nonviable tissue requiring removal, apply chemical or mechanical debrider and cover with secondary dressing/foam    For wounds with tunneling, dead space, or cavity, fill or pack with strip/gauze/kerlex to fit and cover with secondary dressing/foam    For wounds with adequate granulation or epithelization, apply wound gel, hydrocolloid, polymer, collagen, or transparent film, and cover secondary dry dressing/foam    For wounds that need additional secondary dressing to help pad or control additional drainage/exudates, add foam, absorbent pad or hydrocolloid    For wounds with suspected or known infection, apply antimicrobial mesh and/or antimicrobial alginate/hydrofiber, or antimicrobial solution moistened gauze/kerlex, or equivalent and cover with secondary dressing/foam    Compression Management needed for edema control, apply multilayer compression or tubular garment or equivalent    Offloading Management needed for pressure relief, apply offloading shoe/boot or equivalent     Standing Status:   Standing     Number of Occurrences:   1          Discharge Instructions            [address] [facility] -Phone: [phone] Fax: [phone]    Visit  Discharge Instructions / Physician Orders     DATE: 10/26/2022     Home Care: N/A     SUPPLIES ORDERED THRU: [employer]     Wound Location: Right Plantar Foot     Cleanse with: Liquid antibacterial soap and water, rinse well      Dressing Orders: Coty to wound, Silicone Border Dressing     Frequency: Daily     Additional Orders: Increase protein to diet (meat, cheese, eggs, fish, peanut butter, nuts and beans)  Multivitamin daily  ELEVATE LEGS AS MUCH AS POSSIBLE  6/29/22- X-Ray Ordered  6/29/22- Vascular Studies Ordered     Your next appointment with [facility] is in 2 weeks with Dr. Jessica Gomez     (Please note your next appointment above and if you are unable to keep, kindly give a 24 hour notice. Thank you.)  If more than 15 min late we cannot guarantee you will be seen due to clinician schedule  Per Policy, Excessive cancellation will call for dismissal from program.     If you experience any of the following, please call the [employer] [facility] during business hours:  [phone]  Your Phone call may be forwarded to [employer] [employer] during business hours that [employer] is closed. * Increase in Pain  * Temperature over 101  * Increase in drainage from your wound  * Drainage with a foul odor  * Bleeding  * Increase in swelling  * Need for compression bandage changes due to slippage, breakthrough drainage. If you need medical attention outside of the business hours of the [employer] [facility] please contact your PCP or go to the nearest emergency room. The information contained in the After Visit Summary has been reviewed with me, the patient and/or responsible adult, by my health care provider(s). I had the opportunity to ask questions regarding this information.  I have elected to receive;      []After Visit Summary  [x]Comprehensive Discharge Instruction        Patient signature______________________________________Date:________  Electronically signed by Lauri Davila RN on 10/26/2022 at 8:32 AM  Electronically signed by Baltazar Millard DPM on 10/26/2022 at 8:22 AM        Electronically signed by Baltazar Millard DPM on 10/26/2022 at 8:33 AM

## 2022-11-01 DIAGNOSIS — R60.0 EDEMA OF BOTH LEGS: ICD-10-CM

## 2022-11-01 RX ORDER — FUROSEMIDE 40 MG/1
TABLET ORAL
Qty: 90 TABLET | Refills: 1 | Status: SHIPPED | OUTPATIENT
Start: 2022-11-01

## 2022-11-01 RX ORDER — METOCLOPRAMIDE 5 MG/1
TABLET ORAL
Qty: 90 TABLET | Refills: 1 | Status: SHIPPED | OUTPATIENT
Start: 2022-11-01

## 2022-11-01 NOTE — TELEPHONE ENCOUNTER
Patrice James is calling to request a refill on the following medication(s):    Last Visit Date (If Applicable):  9/54/5555    Next Visit Date:    Visit date not found    Medication Request:  Requested Prescriptions     Pending Prescriptions Disp Refills    metoclopramide (REGLAN) 5 MG tablet [Pharmacy Med Name: METOCLOPRAMIDE HCL 5 MG Tablet] 90 tablet 1     Sig: TAKE 1 TABLET EVERY DAY    furosemide (LASIX) 40 MG tablet [Pharmacy Med Name: FUROSEMIDE 40 MG Tablet] 90 tablet 1     Sig: TAKE 1 TABLET EVERY DAY

## 2022-11-08 NOTE — DISCHARGE INSTRUCTIONS
[address] [facility] -Phone: [phone] Fax: [phone]    Visit  Discharge Instructions / Physician Orders     DATE: 11/9/2022     Home Care: N/A     SUPPLIES ORDERED THRU: [employer]     Wound Location: Right Plantar Foot     Cleanse with: Liquid antibacterial soap and water, rinse well      Dressing Orders: Coty to wound, Silicone Border Dressing     Frequency: Daily     Additional Orders: Increase protein to diet (meat, cheese, eggs, fish, peanut butter, nuts and beans)  Multivitamin daily  ELEVATE LEGS AS MUCH AS POSSIBLE  6/29/22- X-Ray Ordered  6/29/22- Vascular Studies Ordered     Your next appointment with [facility] is in 2 weeks with Dr. Saad Tellez     (Please note your next appointment above and if you are unable to keep, kindly give a 24 hour notice. Thank you.)  If more than 15 min late we cannot guarantee you will be seen due to clinician schedule  Per Policy, Excessive cancellation will call for dismissal from program.     If you experience any of the following, please call the [facility] during business hours:  [phone]  Your Phone call may be forwarded to [employer] during business hours that [facility] is closed. * Increase in Pain  * Temperature over 101  * Increase in drainage from your wound  * Drainage with a foul odor  * Bleeding  * Increase in swelling  * Need for compression bandage changes due to slippage, breakthrough drainage. If you need medical attention outside of the business hours of the [email or web address] please contact your PCP or go to the nearest emergency room. The information contained in the After Visit Summary has been reviewed with me, the patient and/or responsible adult, by my health care provider(s). I had the opportunity to ask questions regarding this information.  I have elected to receive;      []After Visit Summary  [x]Comprehensive Discharge Instruction        Patient signature______________________________________Date:________  Electronically signed by Cristopher Gomes RN on 11/9/2022 at 8:38 AM  Electronically signed by Joellyn Osler, DPM on 11/9/2022 at 8:37 AM

## 2022-11-09 ENCOUNTER — HOSPITAL ENCOUNTER (OUTPATIENT)
Dept: WOUND CARE | Age: 72
Discharge: HOME | End: 2022-11-09
Payer: MEDICARE

## 2022-11-09 VITALS
TEMPERATURE: 97.2 F | WEIGHT: 164 LBS | HEIGHT: 69 IN | SYSTOLIC BLOOD PRESSURE: 117 MMHG | HEART RATE: 69 BPM | DIASTOLIC BLOOD PRESSURE: 64 MMHG | BODY MASS INDEX: 24.29 KG/M2 | RESPIRATION RATE: 18 BRPM

## 2022-11-09 DIAGNOSIS — L97.512 RIGHT FOOT ULCER, WITH FAT LAYER EXPOSED (HCC): Primary | ICD-10-CM

## 2022-11-09 PROCEDURE — 11042 DBRDMT SUBQ TIS 1ST 20SQCM/<: CPT

## 2022-11-09 RX ORDER — LIDOCAINE 50 MG/G
OINTMENT TOPICAL ONCE
OUTPATIENT
Start: 2022-11-09 | End: 2022-11-09

## 2022-11-09 RX ORDER — GENTAMICIN SULFATE 1 MG/G
OINTMENT TOPICAL ONCE
OUTPATIENT
Start: 2022-11-09 | End: 2022-11-09

## 2022-11-09 RX ORDER — LIDOCAINE HYDROCHLORIDE 40 MG/ML
SOLUTION TOPICAL ONCE
OUTPATIENT
Start: 2022-11-09 | End: 2022-11-09

## 2022-11-09 RX ORDER — BACITRACIN ZINC AND POLYMYXIN B SULFATE 500; 1000 [USP'U]/G; [USP'U]/G
OINTMENT TOPICAL ONCE
OUTPATIENT
Start: 2022-11-09 | End: 2022-11-09

## 2022-11-09 RX ORDER — CLOBETASOL PROPIONATE 0.5 MG/G
OINTMENT TOPICAL ONCE
OUTPATIENT
Start: 2022-11-09 | End: 2022-11-09

## 2022-11-09 RX ORDER — BETAMETHASONE DIPROPIONATE 0.05 %
OINTMENT (GRAM) TOPICAL ONCE
OUTPATIENT
Start: 2022-11-09 | End: 2022-11-09

## 2022-11-09 RX ORDER — BACITRACIN, NEOMYCIN, POLYMYXIN B 400; 3.5; 5 [USP'U]/G; MG/G; [USP'U]/G
OINTMENT TOPICAL ONCE
OUTPATIENT
Start: 2022-11-09 | End: 2022-11-09

## 2022-11-09 RX ORDER — GINSENG 100 MG
CAPSULE ORAL ONCE
OUTPATIENT
Start: 2022-11-09 | End: 2022-11-09

## 2022-11-09 RX ORDER — LIDOCAINE HYDROCHLORIDE 20 MG/ML
JELLY TOPICAL ONCE
OUTPATIENT
Start: 2022-11-09 | End: 2022-11-09

## 2022-11-09 RX ORDER — LIDOCAINE 40 MG/G
CREAM TOPICAL ONCE
OUTPATIENT
Start: 2022-11-09 | End: 2022-11-09

## 2022-11-09 RX ORDER — LIDOCAINE HYDROCHLORIDE 20 MG/ML
JELLY TOPICAL ONCE
Status: COMPLETED | OUTPATIENT
Start: 2022-11-09 | End: 2022-11-09

## 2022-11-09 RX ADMIN — LIDOCAINE HYDROCHLORIDE 6 ML: 20 JELLY TOPICAL at 08:32

## 2022-11-09 NOTE — PROGRESS NOTES
[address]   Progress Note and Procedure Note      Ivelisse Nieves  MEDICAL RECORD NUMBER:  [id number]  AGE: 81 y.o. GENDER: female  : 1950  EPISODE DATE:  2022    Subjective:     Chief Complaint   Patient presents with    Wound Check     Right foot         HISTORY of PRESENT ILLNESS HPI     Ivelisse Nieves is a 81 y.o. female who presents today for wound/ulcer evaluation. Current evaluation:   Renée Black presents in her [address]. No sign of infection. Does not want to use the total contact cast; wishes to continue with CROW for offloading. Has been improving. Interval history:  XR was negative for sign of osteomyelitis. Renée Black has scheduled the noninvasive vascular testing for . States she has an appointment tomorrow for [facility] to [facility]. States that she is not able to use a knee scooter, crutches, or a walker to maintain non-weightbearing to the right lower extremity. History of Wound Context: Renée Black presents for evaluation of plantar right foot ulceration. She states that it has been open for about 2 months now. She was seeing a podiatrist for this and was referred to the wound care clinic. She presents in a Crow on the right and a custom diabetic shoe on the left. She does have a history of Charcot. Has not had any sign or symptom of infection.     Ulcer Identification:  Ulcer Type: diabetic and neuropathic  Contributing Factors: edema, lymphedema, diabetes, chronic pressure and shear force          PAST MEDICAL HISTORY        Diagnosis Date    Diabetic polyneuropathy associated with type 1 diabetes mellitus ([address] 3/27/2019    Diabetic ulcer of left foot associated with diabetes mellitus due to underlying condition, with fat layer exposed ([address] 3/27/2019    Type 2 diabetes mellitus with hyperglycemia, with long-term current use of insulin ([address] 2018    Type II or unspecified type diabetes mellitus without mention of complication, not stated as uncontrolled        PAST SURGICAL HISTORY    Past Surgical History:   Procedure Laterality Date    FOOT SURGERY  +10years    R foot        FAMILY HISTORY    Family History   Problem Relation Age of Onset    Diabetes Mother     Diabetes Brother        SOCIAL HISTORY    Social History     Tobacco Use    Smoking status: Never    Smokeless tobacco: Never   Vaping Use    Vaping Use: Never used   Substance Use Topics    Alcohol use: No    Drug use: No       ALLERGIES    No Known Allergies    MEDICATIONS    Current Outpatient Medications on File Prior to Encounter   Medication Sig Dispense Refill    metoclopramide (REGLAN) 5 MG tablet TAKE 1 TABLET EVERY DAY 90 tablet 1    furosemide (LASIX) 40 MG tablet TAKE 1 TABLET EVERY DAY 90 tablet 1    DROPLET PEN NEEDLES 32G X 4 MM MISC USE TO INJECT SUBCUTANEOUSLY EVERY  each 3    Alcohol Swabs (DROPSAFE ALCOHOL PREP) 70 % PADS USE THREE TIMES DAILY 300 each 5    glucose monitoring (FREESTYLE FREEDOM) kit 1 kit by Does not apply route daily 1 kit 0    blood glucose monitor strips Test 4 times a day & as needed for symptoms of irregular blood glucose. Dispense sufficient amount for indicated testing frequency plus additional to accommodate PRN testing needs.  300 strip 0    Lancets MISC 1 each by Does not apply route 4 times daily 100 each 5    TOUJEO SOLOSTAR 300 UNIT/ML SOPN INJECT 50 UNITS UNDER THE SKIN AT BEDTIME 5 pen 5    atorvastatin (LIPITOR) 10 MG tablet TAKE 1 TABLET EVERY DAY 90 tablet 3    alendronate (FOSAMAX) 70 MG tablet TAKE 1 TABLET EVERY 7 DAYS 12 tablet 3    Accu-Chek Softclix Lancets MISC TEST THREE TIMES DAILY (Patient not taking: No sig reported) 300 each 3    blood glucose test strips (ACCU-CHEK MAMI PLUS) strip TEST THREE TIMES DAILY AS NEEDED (Patient not taking: No sig reported) 300 strip 3    potassium chloride (KLOR-CON M) 20 MEQ extended release tablet TAKE 1 TABLET EVERY DAY 90 tablet 1    levothyroxine (SYNTHROID) 50 MCG tablet Take 1 tablet by mouth daily 30 tablet 2    FERREX 150 FORTE 150-1-25 MG-MG-MCG CAPS capsule TAKE 1 CAPSULE BY MOUTH TWICE DAILY 30 capsule 11    acetaZOLAMIDE (DIAMOX) 500 MG extended release capsule       TRAVATAN Z 0.004 % SOLN ophthalmic solution       brimonidine (ALPHAGAN) 0.2 % ophthalmic solution Place 1 drop into both eyes 2 times daily 1 Bottle 0    aspirin (ASPIRIN LOW DOSE) 81 MG EC tablet Take 1 tablet by mouth daily 30 tablet 12    dorzolamide-timolol (COSOPT) 22.3-6.8 MG/ML ophthalmic solution        No current facility-administered medications on file prior to encounter. REVIEW OF SYSTEMS    Review of Systems   Constitutional:  Negative for chills and fever. Skin:  Positive for wound. Objective:      /64   Pulse 69   Temp 97.2 °F (36.2 °C) (Tympanic)   Resp 18   Ht 5' 9\" (1.753 m)   Wt 164 lb (74.4 kg)   BMI 24.22 kg/m²     Wt Readings from Last 3 Encounters:   11/09/22 164 lb (74.4 kg)   10/26/22 164 lb (74.4 kg)   10/12/22 164 lb (74.4 kg)       Physical Exam:  General:  Alert and oriented x3. In no acute distress. Lower Extremity Physical Exam:    Vascular: DP pulse on the left is palpable and not palpable on the right. PT pulse on the right is palpable and not palpable on the left. CFT <3 seconds to all digits, Bilateral.  Pitting edema, Bilateral.  Hair growth is absent to the level of the lower leg, Bilateral.     Neuro: Saph/sural/SP/DP/plantar sensation absent to light touch. Protective sensation is intact to  0 /10 sites as tested with a 5.07g SWMF, Bilateral.     Musculoskeletal: EHL/FHL/GS/TA gross motor intact. Gross deformity is present, rocker bottom deformity right foot with prominence at the site of ulceration. Dermatologic: Open wound present to plantar right midfoot as documented in detail below. Wound base is fibrotic with slough. Leda-wound skin is hyperkeratotic. Negative probe to bone. There is no erythema.   There is no purulent drainage. There is no fluctuance or crepitus. Interdigital maceration absent, Bilateral.       Assessment:      Active Hospital Problems    Diagnosis Date Noted    Right foot ulcer, with fat layer exposed [facility]) [L97.512] 06/29/2022     Priority: Medium    Charcot's joint of foot, right [M14.671] 06/29/2022     Priority: Medium    Diabetic polyneuropathy associated with type 2 diabetes mellitus ([facility] [E11.42] 03/27/2019       Plan:     Treatment Note please see attached Discharge Instructions    Discussed the importance of offloading for healing of a plantar neuropathic ulceration. Discussed NWB vs total contact cast.  Recommended TCC again today. She wishes to hold off on TCC at this time and continue with CROW. Recommended that she limit ambulation as much as possible in the meantime. Wear CROW at all times while walking. [place] to the wound base daily    Educated on signs and symptoms of infection. Instructed to call clinic immediately or go to ER if signs and symptoms of infection are present. RTC 1 week       Procedure Note  Indications:  Based on my examination of this patient's wound(s)/ulcer(s) today, debridement is required to promote healing and evaluate the wound base. Performed by: Kiran Moore DPM    Consent obtained:  Yes    Time out taken:  Yes    Pain Control: Anesthetic  Anesthetic: 2% Lidocaine Gel Topical       Debridement: Excisional Debridement    Using curette and tissue nippers the wound(s)/ulcer(s) was/were sharply debrided down through and including the removal of subcutaneous tissue.         Devitalized Tissue Debrided:  fibrin, biofilm, slough and callus    Pre Debridement Measurements:  Are located in the [facility]  Documentation Flow Sheet    Wound/Ulcer #: 1    Post Debridement Measurements:  Wound/Ulcer Descriptions are Pre Debridement except measurements:    Wound 06/29/22 Foot Right;Plantar #1 (Active)   Wound Image   09/28/22 0830   Wound Etiology Diabetic [place] 2 11/09/22 0830   Dressing Status New drainage noted; Old drainage noted 11/09/22 0830   Wound Cleansed Cleansed with saline 11/09/22 0830   Offloading for Diabetic Foot Ulcers Offloading ordered; Offloading boot 11/09/22 0830   Wound Length (cm) 0.3 cm 11/09/22 0830   Wound Width (cm) 0.3 cm 11/09/22 0830   Wound Depth (cm) 0.1 cm 11/09/22 0830   Wound Surface Area (cm^2) 0.09 cm^2 11/09/22 0830   Change in Wound Size % (l*w) 97.04 11/09/22 0830   Wound Volume (cm^3) 0.009 cm^3 11/09/22 0830   Wound Healing % 100 11/09/22 0830   Post-Procedure Length (cm) 0.3 cm 11/09/22 0830   Post-Procedure Width (cm) 0.3 cm 11/09/22 0830   Post-Procedure Depth (cm) 0.1 cm 11/09/22 0830   Post-Procedure Surface Area (cm^2) 0.09 cm^2 11/09/22 0830   Post-Procedure Volume (cm^3) 0.009 cm^3 11/09/22 0830   Wound Assessment Pink/red 11/09/22 0830   Drainage Amount Moderate 11/09/22 0830   Drainage Description Serosanguinous; Yellow 11/09/22 0830   Odor None 11/09/22 0830   Leda-wound Assessment Hyperkeratosis (callous); Maceration 11/09/22 0830   Margins Defined edges 11/09/22 0830   Wound Thickness Description not for Pressure Injury Full thickness 11/09/22 0830   Number of days: 133          Percent of Wound(s)/Ulcer(s) Debrided: 100%    Total Surface Area Debrided:  0.09 sq cm     Diabetic/Pressure/Non Pressure Ulcers only:  Ulcer: Diabetic ulcer, fat layer exposed     Estimated Blood Loss:  Estimated amount of blood loss is 2ml. Hemostasis Achieved:  by pressure    Response to treatment:  Well tolerated by patient. Written patient discharge instructions given to patient and signed by patient or POA.       Orders Placed This Encounter   Medications    lidocaine (XYLOCAINE) 2 % uro-jet     Orders Placed This Encounter   Procedures    Initiate Outpatient Wound Care Protocol     Cleanse wound with saline    If wound contains bioburden or contamination cleanse with wound cleanser or antimicrobial solution     For normal periwound tissue without irritation nor maceration, apply topical skin protectant    For periwound tissue with irritation and/or maceration, apply zinc based product, topical steroid cream/ointment, or equivalent     For wounds with dry firm black eschar and/or without exudate, apply betadine and leave open to air      For wounds with scant/small to no exudate or drainage, apply wound gel, hydrocolloid, polymer, or equivalent and cover with secondary dressing/foam      For wounds with moderate/large exudate or drainage, apply alginate, hydrofiber, polymer, or equivalent and cover with secondary dressing/foam    For wounds with nonviable tissue requiring removal, apply chemical or mechanical debrider and cover with secondary dressing/foam    For wounds with tunneling, dead space, or cavity, fill or pack with strip/gauze/kerlex to fit and cover with secondary dressing/foam    For wounds with adequate granulation or epithelization, apply wound gel, hydrocolloid, polymer, collagen, or transparent film, and cover secondary dry dressing/foam    For wounds that need additional secondary dressing to help pad or control additional drainage/exudates, add foam, absorbent pad or hydrocolloid    For wounds with suspected or known infection, apply antimicrobial mesh and/or antimicrobial alginate/hydrofiber, or antimicrobial solution moistened gauze/kerlex, or equivalent and cover with secondary dressing/foam    Compression Management needed for edema control, apply multilayer compression or tubular garment or equivalent    Offloading Management needed for pressure relief, apply offloading shoe/boot or equivalent     Standing Status:   Standing     Number of Occurrences:   1          Discharge Instructions            [address] [facility],[address] -Phone: [phone] Fax: [phone]    Visit  Discharge Instructions / Physician Orders     DATE: 11/9/2022     Home Care: N/A     SUPPLIES ORDERED THRU: [facility]

## 2022-11-21 NOTE — DISCHARGE INSTRUCTIONS
[address] [facility],[address] -Phone: [phone] Fax: [phone]    Visit  Discharge Instructions / Physician Orders     DATE: 11/23/2022     Home Care: N/A     SUPPLIES ORDERED THRU: [facility]     Wound Location: Right Plantar Foot-healed     Cleanse with: Liquid antibacterial soap and water, rinse well      Dressing Orders: Augustus eKnt any time walking     Frequency: Daily     Additional Orders: Increase protein to diet (meat, cheese, eggs, fish, peanut butter, nuts and beans)  Multivitamin daily  ELEVATE LEGS AS MUCH AS POSSIBLE  6/29/22- X-Ray Ordered  6/29/22- Vascular Studies Ordered     Your next appointment with [facility] is-call if needed     (Please note your next appointment above and if you are unable to keep, kindly give a 24 hour notice. Thank you.)  If more than 15 min late we cannot guarantee you will be seen due to clinician schedule  Per Policy, Excessive cancellation will call for dismissal from program.     If you experience any of the following, please call the [facility] [employer] during business hours:  [phone]  Your Phone call may be forwarded to [facility] during business hours that [facility] is closed. * Increase in Pain  * Temperature over 101  * Increase in drainage from your wound  * Drainage with a foul odor  * Bleeding  * Increase in swelling  * Need for compression bandage changes due to slippage, breakthrough drainage. If you need medical attention outside of the business hours of the [facility] please contact your PCP or go to the nearest emergency room. The information contained in the After Visit Summary has been reviewed with me, the patient and/or responsible adult, by my health care provider(s). I had the opportunity to ask questions regarding this information.  I have elected to receive;      []After Visit Summary  [x]Comprehensive Discharge Instruction        Patient signature______________________________________Date:________  Electronically signed by Yariel Partida RN on 11/23/2022 at 8:40 AM  Electronically signed by Aminata Cerna DPM on 11/23/2022 at 8:22 AM

## 2022-11-23 ENCOUNTER — HOSPITAL ENCOUNTER (OUTPATIENT)
Dept: WOUND CARE | Age: 72
Discharge: HOME | End: 2022-11-23
Payer: MEDICARE

## 2022-11-23 VITALS
BODY MASS INDEX: 24.29 KG/M2 | DIASTOLIC BLOOD PRESSURE: 61 MMHG | TEMPERATURE: 97.8 F | SYSTOLIC BLOOD PRESSURE: 103 MMHG | HEIGHT: 69 IN | WEIGHT: 164 LBS | RESPIRATION RATE: 169 BRPM | HEART RATE: 69 BPM

## 2022-11-23 DIAGNOSIS — L97.512 RIGHT FOOT ULCER, WITH FAT LAYER EXPOSED (HCC): Primary | ICD-10-CM

## 2022-11-23 PROCEDURE — 99212 OFFICE O/P EST SF 10 MIN: CPT

## 2022-11-23 RX ORDER — LIDOCAINE HYDROCHLORIDE 40 MG/ML
SOLUTION TOPICAL ONCE
OUTPATIENT
Start: 2022-11-23 | End: 2022-11-23

## 2022-11-23 RX ORDER — BACITRACIN ZINC AND POLYMYXIN B SULFATE 500; 1000 [USP'U]/G; [USP'U]/G
OINTMENT TOPICAL ONCE
OUTPATIENT
Start: 2022-11-23 | End: 2022-11-23

## 2022-11-23 RX ORDER — BETAMETHASONE DIPROPIONATE 0.05 %
OINTMENT (GRAM) TOPICAL ONCE
OUTPATIENT
Start: 2022-11-23 | End: 2022-11-23

## 2022-11-23 RX ORDER — LIDOCAINE HYDROCHLORIDE 20 MG/ML
JELLY TOPICAL ONCE
OUTPATIENT
Start: 2022-11-23 | End: 2022-11-23

## 2022-11-23 RX ORDER — LIDOCAINE 40 MG/G
CREAM TOPICAL ONCE
OUTPATIENT
Start: 2022-11-23 | End: 2022-11-23

## 2022-11-23 RX ORDER — GINSENG 100 MG
CAPSULE ORAL ONCE
OUTPATIENT
Start: 2022-11-23 | End: 2022-11-23

## 2022-11-23 RX ORDER — BACITRACIN, NEOMYCIN, POLYMYXIN B 400; 3.5; 5 [USP'U]/G; MG/G; [USP'U]/G
OINTMENT TOPICAL ONCE
OUTPATIENT
Start: 2022-11-23 | End: 2022-11-23

## 2022-11-23 RX ORDER — CLOBETASOL PROPIONATE 0.5 MG/G
OINTMENT TOPICAL ONCE
OUTPATIENT
Start: 2022-11-23 | End: 2022-11-23

## 2022-11-23 RX ORDER — GENTAMICIN SULFATE 1 MG/G
OINTMENT TOPICAL ONCE
OUTPATIENT
Start: 2022-11-23 | End: 2022-11-23

## 2022-11-23 RX ORDER — LIDOCAINE 50 MG/G
OINTMENT TOPICAL ONCE
OUTPATIENT
Start: 2022-11-23 | End: 2022-11-23

## 2022-11-23 NOTE — PROGRESS NOTES
[address]   Progress Note and Procedure Note      Alexa Gong  MEDICAL RECORD NUMBER:  [id number]  AGE: 81 y.o. GENDER: female  : 1950  EPISODE DATE:  2022    Subjective:     Chief Complaint   Patient presents with    Wound Check     Right foot         HISTORY of PRESENT ILLNESS HPI     Alexa Gong is a 81 y.o. female who presents today for wound/ulcer evaluation. Current evaluation:   Todd Santos presents in her [address]. No sign of infection. Ulceration has healed. Interval history:  XR was negative for sign of osteomyelitis. Todd Santos has scheduled the noninvasive vascular testing for . States she has an appointment tomorrow for [facility] to modify the [facility]. States that she is not able to use a knee scooter, crutches, or a walker to maintain non-weightbearing to the right lower extremity. History of Wound Context: Todd Santos presents for evaluation of plantar right foot ulceration. She states that it has been open for about 2 months now. She was seeing a podiatrist for this and was referred to the wound care clinic. She presents in a Crow on the right and a custom diabetic shoe on the left. She does have a history of Charcot. Has not had any sign or symptom of infection.     Ulcer Identification:  Ulcer Type: diabetic and neuropathic  Contributing Factors: edema, lymphedema, diabetes, chronic pressure and shear force          PAST MEDICAL HISTORY        Diagnosis Date    Diabetic polyneuropathy associated with type 1 diabetes mellitus ([address] 3/27/2019    Diabetic ulcer of left foot associated with diabetes mellitus due to underlying condition, with fat layer exposed ([address] 3/27/2019    Type 2 diabetes mellitus with hyperglycemia, with long-term current use of insulin ([address] 2018    Type II or unspecified type diabetes mellitus without mention of complication, not stated as uncontrolled        PAST SURGICAL HISTORY    Past Surgical History:   Procedure Laterality Date    FOOT SURGERY  +10years    R foot        FAMILY HISTORY    Family History   Problem Relation Age of Onset    Diabetes Mother     Diabetes Brother        SOCIAL HISTORY    Social History     Tobacco Use    Smoking status: Never    Smokeless tobacco: Never   Vaping Use    Vaping Use: Never used   Substance Use Topics    Alcohol use: No    Drug use: No       ALLERGIES    No Known Allergies    MEDICATIONS    Current Outpatient Medications on File Prior to Encounter   Medication Sig Dispense Refill    metoclopramide (REGLAN) 5 MG tablet TAKE 1 TABLET EVERY DAY 90 tablet 1    furosemide (LASIX) 40 MG tablet TAKE 1 TABLET EVERY DAY 90 tablet 1    DROPLET PEN NEEDLES 32G X 4 MM MISC USE TO INJECT SUBCUTANEOUSLY EVERY  each 3    Alcohol Swabs (DROPSAFE ALCOHOL PREP) 70 % PADS USE THREE TIMES DAILY 300 each 5    glucose monitoring (FREESTYLE FREEDOM) kit 1 kit by Does not apply route daily 1 kit 0    blood glucose monitor strips Test 4 times a day & as needed for symptoms of irregular blood glucose. Dispense sufficient amount for indicated testing frequency plus additional to accommodate PRN testing needs.  300 strip 0    Lancets MISC 1 each by Does not apply route 4 times daily 100 each 5    TOUJEO SOLOSTAR 300 UNIT/ML SOPN INJECT 50 UNITS UNDER THE SKIN AT BEDTIME 5 pen 5    atorvastatin (LIPITOR) 10 MG tablet TAKE 1 TABLET EVERY DAY 90 tablet 3    alendronate (FOSAMAX) 70 MG tablet TAKE 1 TABLET EVERY 7 DAYS 12 tablet 3    Accu-Chek Softclix Lancets MISC TEST THREE TIMES DAILY (Patient not taking: No sig reported) 300 each 3    blood glucose test strips (ACCU-CHEK MAMI PLUS) strip TEST THREE TIMES DAILY AS NEEDED (Patient not taking: No sig reported) 300 strip 3    potassium chloride (KLOR-CON M) 20 MEQ extended release tablet TAKE 1 TABLET EVERY DAY 90 tablet 1    levothyroxine (SYNTHROID) 50 MCG tablet Take 1 tablet by mouth daily 30 tablet 2    FERREX 150 FORTE 150-1-25 MG-MG-MCG CAPS capsule TAKE 1 CAPSULE BY MOUTH TWICE DAILY 30 capsule 11    acetaZOLAMIDE (DIAMOX) 500 MG extended release capsule       TRAVATAN Z 0.004 % SOLN ophthalmic solution       brimonidine (ALPHAGAN) 0.2 % ophthalmic solution Place 1 drop into both eyes 2 times daily 1 Bottle 0    aspirin (ASPIRIN LOW DOSE) 81 MG EC tablet Take 1 tablet by mouth daily 30 tablet 12    dorzolamide-timolol (COSOPT) 22.3-6.8 MG/ML ophthalmic solution        No current facility-administered medications on file prior to encounter. REVIEW OF SYSTEMS    Review of Systems   Constitutional:  Negative for chills and fever. Skin:  Negative for wound. Objective:      /61   Pulse 69   Temp 97.8 °F (36.6 °C) (Tympanic)   Resp (!) 169   Ht 5' 9\" (1.753 m)   Wt 164 lb (74.4 kg)   BMI 24.22 kg/m²     Wt Readings from Last 3 Encounters:   11/23/22 164 lb (74.4 kg)   11/09/22 164 lb (74.4 kg)   10/26/22 164 lb (74.4 kg)       Physical Exam:  General:  Alert and oriented x3. In no acute distress. Lower Extremity Physical Exam:    Vascular: DP pulse on the left is palpable and not palpable on the right. PT pulse on the right is palpable and not palpable on the left. CFT <3 seconds to all digits, Bilateral.  Pitting edema, Bilateral.  Hair growth is absent to the level of the lower leg, Bilateral.     Neuro: Saph/sural/SP/DP/plantar sensation absent to light touch. Protective sensation is intact to  0 /10 sites as tested with a 5.07g SWMF, Bilateral.     Musculoskeletal: EHL/FHL/GS/TA gross motor intact. Gross deformity is present, rocker bottom deformity right foot with prominence at the midfoot. Dermatologic:   No ulceration. There is no erythema. There is no drainage. There is no fluctuance or crepitus.   Interdigital maceration absent, Bilateral.       Assessment:      Active Hospital Problems    Diagnosis Date Noted    Charcot's joint of foot, right [U31.952] 06/29/2022     Priority: Medium    Diabetic polyneuropathy associated with type 2 diabetes mellitus ([facility] [E11.42] 03/27/2019       Plan:     Treatment Note please see attached Discharge Instructions    Discussed the importance of offloading for healing of a plantar neuropathic ulceration and prevention of ulceration now that he is healed. Wear CROW at all times while walking. Monitor for new wound closely and call right away if present. RTC as needed        Written patient discharge instructions given to patient and signed by patient or POA. No orders of the defined types were placed in this encounter.     Orders Placed This Encounter   Procedures    Initiate Outpatient Wound Care Protocol     Cleanse wound with saline    If wound contains bioburden or contamination cleanse with wound cleanser or antimicrobial solution     For normal periwound tissue without irritation nor maceration, apply topical skin protectant    For periwound tissue with irritation and/or maceration, apply zinc based product, topical steroid cream/ointment, or equivalent     For wounds with dry firm black eschar and/or without exudate, apply betadine and leave open to air      For wounds with scant/small to no exudate or drainage, apply wound gel, hydrocolloid, polymer, or equivalent and cover with secondary dressing/foam      For wounds with moderate/large exudate or drainage, apply alginate, hydrofiber, polymer, or equivalent and cover with secondary dressing/foam    For wounds with nonviable tissue requiring removal, apply chemical or mechanical debrider and cover with secondary dressing/foam    For wounds with tunneling, dead space, or cavity, fill or pack with strip/gauze/kerlex to fit and cover with secondary dressing/foam    For wounds with adequate granulation or epithelization, apply wound gel, hydrocolloid, polymer, collagen, or transparent film, and cover secondary dry dressing/foam    For wounds that need additional secondary dressing to help pad or control additional drainage/exudates, add foam, absorbent pad or hydrocolloid    For wounds with suspected or known infection, apply antimicrobial mesh and/or antimicrobial alginate/hydrofiber, or antimicrobial solution moistened gauze/kerlex, or equivalent and cover with secondary dressing/foam    Compression Management needed for edema control, apply multilayer compression or tubular garment or equivalent    Offloading Management needed for pressure relief, apply offloading shoe/boot or equivalent     Standing Status:   Standing     Number of Occurrences:   1          Discharge Instructions            [address] [facility] -Phone: [phone] Fax: [phone]    Visit  Discharge Instructions / Physician Orders     DATE: 11/23/2022     Home Care: N/A     SUPPLIES ORDERED THRU: [employer]     Wound Location: Right Plantar Foot-healed     Cleanse with: Liquid antibacterial soap and water, rinse well      Dressing Orders: Maggie Tse any time walking     Frequency: Daily     Additional Orders: Increase protein to diet (meat, cheese, eggs, fish, peanut butter, nuts and beans)  Multivitamin daily  ELEVATE LEGS AS MUCH AS POSSIBLE  6/29/22- X-Ray Ordered  6/29/22- Vascular Studies Ordered     [facility]call if needed     (Please note your next appointment above and if you are unable to keep, kindly give a 24 hour notice. Thank you.)  If more than 15 min late we cannot guarantee you will be seen due to clinician schedule  Per Policy, Excessive cancellation will call for dismissal from program.     If you experience any of the following, please call the [employer] during business hours:  [phone]  Your Phone call may be forwarded to [facility] during business hours that Juliana [facility] is closed.      * Increase in Pain  * Temperature over 101  * Increase in drainage from your wound  * Drainage with a foul odor  * Bleeding  * Increase in swelling  * Need for compression bandage changes due to slippage, breakthrough drainage. If you need medical attention outside of the business hours of the [address] [place] [address] Road please contact your PCP or go to the nearest emergency room. The information contained in the After Visit Summary has been reviewed with me, the patient and/or responsible adult, by my health care provider(s). I had the opportunity to ask questions regarding this information.  I have elected to receive;      []After Visit Summary  [x]Comprehensive Discharge Instruction        Patient signature______________________________________Date:________  Electronically signed by Jeramie Herman RN on 11/23/2022 at 8:40 AM  Electronically signed by Vick Wallis DPM on 11/23/2022 at 8:22 AM        Electronically signed by Vick Wallis DPM on 11/23/2022 at 8:49 AM

## 2023-01-09 ENCOUNTER — TELEPHONE (OUTPATIENT)
Dept: FAMILY MEDICINE CLINIC | Age: 73
End: 2023-01-09

## 2023-01-09 NOTE — TELEPHONE ENCOUNTER
Patient called in stating that she needs a prescription for her to get some diabetic shoes      Fax [phone]

## 2023-04-05 ENCOUNTER — TELEPHONE (OUTPATIENT)
Dept: FAMILY MEDICINE CLINIC | Age: 73
End: 2023-04-05

## 2023-04-05 DIAGNOSIS — Z79.4 TYPE 2 DIABETES MELLITUS WITH HYPERGLYCEMIA, WITH LONG-TERM CURRENT USE OF INSULIN (HCC): ICD-10-CM

## 2023-04-05 DIAGNOSIS — E11.65 TYPE 2 DIABETES MELLITUS WITH HYPERGLYCEMIA, WITH LONG-TERM CURRENT USE OF INSULIN (HCC): ICD-10-CM

## 2023-04-05 RX ORDER — GLUCOSAMINE HCL/CHONDROITIN SU 500-400 MG
CAPSULE ORAL
Qty: 300 STRIP | Refills: 5 | Status: SHIPPED | OUTPATIENT
Start: 2023-04-05

## 2023-04-05 NOTE — TELEPHONE ENCOUNTER
Left message to call office back
Patient calling into receive test strips that go with her aqucheck guideme meter sent to her pharmacy
ok
Intact

## 2023-04-26 ENCOUNTER — OFFICE VISIT (OUTPATIENT)
Dept: FAMILY MEDICINE CLINIC | Age: 73
End: 2023-04-26
Payer: MEDICARE

## 2023-04-26 ENCOUNTER — HOSPITAL ENCOUNTER (OUTPATIENT)
Age: 73
Setting detail: SPECIMEN
Discharge: HOME | End: 2023-04-26

## 2023-04-26 VITALS
WEIGHT: 164 LBS | HEART RATE: 70 BPM | OXYGEN SATURATION: 97 % | BODY MASS INDEX: 24.29 KG/M2 | HEIGHT: 69 IN | SYSTOLIC BLOOD PRESSURE: 144 MMHG | DIASTOLIC BLOOD PRESSURE: 68 MMHG

## 2023-04-26 DIAGNOSIS — E11.65 TYPE 2 DIABETES MELLITUS WITH HYPERGLYCEMIA, WITH LONG-TERM CURRENT USE OF INSULIN (HCC): ICD-10-CM

## 2023-04-26 DIAGNOSIS — E78.00 PURE HYPERCHOLESTEROLEMIA: ICD-10-CM

## 2023-04-26 DIAGNOSIS — E11.65 TYPE 2 DIABETES MELLITUS WITH HYPERGLYCEMIA, WITH LONG-TERM CURRENT USE OF INSULIN (HCC): Primary | ICD-10-CM

## 2023-04-26 DIAGNOSIS — L97.512 RIGHT FOOT ULCER, WITH FAT LAYER EXPOSED (HCC): ICD-10-CM

## 2023-04-26 DIAGNOSIS — R60.0 EDEMA OF BOTH LEGS: ICD-10-CM

## 2023-04-26 DIAGNOSIS — Z79.4 TYPE 2 DIABETES MELLITUS WITH HYPERGLYCEMIA, WITH LONG-TERM CURRENT USE OF INSULIN (HCC): Primary | ICD-10-CM

## 2023-04-26 DIAGNOSIS — E03.9 ACQUIRED HYPOTHYROIDISM: ICD-10-CM

## 2023-04-26 DIAGNOSIS — Z79.4 TYPE 2 DIABETES MELLITUS WITH HYPERGLYCEMIA, WITH LONG-TERM CURRENT USE OF INSULIN (HCC): ICD-10-CM

## 2023-04-26 LAB
CREATININE URINE: 72.1 MG/DL (ref 28–217)
HBA1C MFR BLD: 6.3 %
TOTAL PROTEIN, URINE: 4 MG/DL
URINE TOTAL PROTEIN CREATININE RATIO: 0.06 (ref 0–0.2)

## 2023-04-26 PROCEDURE — 2022F DILAT RTA XM EVC RTNOPTHY: CPT | Performed by: FAMILY MEDICINE

## 2023-04-26 PROCEDURE — 99214 OFFICE O/P EST MOD 30 MIN: CPT | Performed by: FAMILY MEDICINE

## 2023-04-26 PROCEDURE — G8399 PT W/DXA RESULTS DOCUMENT: HCPCS | Performed by: FAMILY MEDICINE

## 2023-04-26 PROCEDURE — 1090F PRES/ABSN URINE INCON ASSESS: CPT | Performed by: FAMILY MEDICINE

## 2023-04-26 PROCEDURE — 3046F HEMOGLOBIN A1C LEVEL >9.0%: CPT | Performed by: FAMILY MEDICINE

## 2023-04-26 PROCEDURE — G8420 CALC BMI NORM PARAMETERS: HCPCS | Performed by: FAMILY MEDICINE

## 2023-04-26 PROCEDURE — 1124F ACP DISCUSS-NO DSCNMKR DOCD: CPT | Performed by: FAMILY MEDICINE

## 2023-04-26 PROCEDURE — 3017F COLORECTAL CA SCREEN DOC REV: CPT | Performed by: FAMILY MEDICINE

## 2023-04-26 PROCEDURE — 83036 HEMOGLOBIN GLYCOSYLATED A1C: CPT | Performed by: FAMILY MEDICINE

## 2023-04-26 PROCEDURE — G8427 DOCREV CUR MEDS BY ELIG CLIN: HCPCS | Performed by: FAMILY MEDICINE

## 2023-04-26 PROCEDURE — 1036F TOBACCO NON-USER: CPT | Performed by: FAMILY MEDICINE

## 2023-04-26 SDOH — ECONOMIC STABILITY: FOOD INSECURITY: WITHIN THE PAST 12 MONTHS, YOU WORRIED THAT YOUR FOOD WOULD RUN OUT BEFORE YOU GOT MONEY TO BUY MORE.: NEVER TRUE

## 2023-04-26 SDOH — ECONOMIC STABILITY: FOOD INSECURITY: WITHIN THE PAST 12 MONTHS, THE FOOD YOU BOUGHT JUST DIDN'T LAST AND YOU DIDN'T HAVE MONEY TO GET MORE.: NEVER TRUE

## 2023-04-26 SDOH — ECONOMIC STABILITY: HOUSING INSECURITY
IN THE LAST 12 MONTHS, WAS THERE A TIME WHEN YOU DID NOT HAVE A STEADY PLACE TO SLEEP OR SLEPT IN A SHELTER (INCLUDING NOW)?: NO

## 2023-04-26 SDOH — ECONOMIC STABILITY: INCOME INSECURITY: HOW HARD IS IT FOR YOU TO PAY FOR THE VERY BASICS LIKE FOOD, HOUSING, MEDICAL CARE, AND HEATING?: NOT HARD AT ALL

## 2023-04-26 ASSESSMENT — PATIENT HEALTH QUESTIONNAIRE - PHQ9
SUM OF ALL RESPONSES TO PHQ QUESTIONS 1-9: 0
SUM OF ALL RESPONSES TO PHQ QUESTIONS 1-9: 0
SUM OF ALL RESPONSES TO PHQ9 QUESTIONS 1 & 2: 0
1. LITTLE INTEREST OR PLEASURE IN DOING THINGS: 0
SUM OF ALL RESPONSES TO PHQ QUESTIONS 1-9: 0
SUM OF ALL RESPONSES TO PHQ QUESTIONS 1-9: 0
2. FEELING DOWN, DEPRESSED OR HOPELESS: 0

## 2023-04-26 NOTE — PROGRESS NOTES
erythematous. No middle ear effusion. Nose: No mucosal edema. Mouth/Throat: Oropharynx is clear and moist. No posterior oropharyngeal erythema. Eyes: Conjunctivae and EOM are normal. Pupils are equal, round, and reactive to light. Neck: Normal range of motion. Neck supple. No thyromegaly present. Cardiovascular: Normal rate, regular rhythm and normal heart sounds. No murmur heard. Pulmonary/Chest: Effort normal and breath sounds normal. She has no wheezes. Shehas no rales. Abdominal: Soft. Bowel sounds are normal. She exhibits no distension and no mass. There is no tenderness. There is no rebound and no guarding. Genitourinary/Anorectal:deferred  Musculoskeletal: Normal range of motion. She exhibits no edema or tenderness. Lymphadenopathy: She has no cervical adenopathy. Neurological: She is alert and oriented to person, place, and time. She has normal reflexes. Skin: Skin is warm and dry. No rash noted. Psychiatric: She has a normal mood and affect. Her   behavior is normal.       Assessment:      1. Type 2 diabetes mellitus with hyperglycemia, with long-term current use of insulin ([address]    2. Edema of both legs    3. Right foot ulcer, with fat layer exposed ([address]    4. Pure hypercholesterolemia    5. Acquired hypothyroidism          Plan:      Call or return to clinic prn if these symptoms worsen or fail to improve as anticipated. I have reviewed the instructions with the patient, answering all questions to her satisfaction. No follow-ups on file. Orders Placed This Encounter   Procedures    Protein / Creatinine Ratio, Urine     Standing Status:   Future     Standing Expiration Date:   4/26/2024    POCT glycosylated hemoglobin (Hb A1C)     No orders of the defined types were placed in this encounter.       Electronically signed by Dolores Snyder DO on 4/26/2023 at 11:17 AM

## 2023-06-23 DIAGNOSIS — Z79.4 TYPE 2 DIABETES MELLITUS WITH HYPERGLYCEMIA, WITH LONG-TERM CURRENT USE OF INSULIN (HCC): ICD-10-CM

## 2023-06-23 DIAGNOSIS — E11.65 TYPE 2 DIABETES MELLITUS WITH HYPERGLYCEMIA, WITH LONG-TERM CURRENT USE OF INSULIN (HCC): ICD-10-CM

## 2023-06-23 RX ORDER — INSULIN GLARGINE 300 U/ML
INJECTION, SOLUTION SUBCUTANEOUS
Qty: 1.5 ML | Refills: 0 | Status: SHIPPED | OUTPATIENT
Start: 2023-06-23

## 2023-06-23 RX ORDER — INSULIN GLARGINE 300 U/ML
INJECTION, SOLUTION SUBCUTANEOUS
Qty: 7.5 ML | Refills: 0 | Status: SHIPPED | OUTPATIENT
Start: 2023-06-23

## 2023-06-23 NOTE — TELEPHONE ENCOUNTER
Amee Preston is calling to request a refill on the following medication(s):    Last Visit Date (If Applicable):  5/54/4856    Next Visit Date:    Visit date not found    Medication Request:  Requested Prescriptions     Pending Prescriptions Disp Refills    insulin glargine, 1 unit dial, (TOUJEO SOLOSTAR) 300 UNIT/ML concentrated injection pen 1.5 mL 0     Sig: INJECT 50 UNITS UNDER THE SKIN AT BEDTIME

## 2023-06-23 NOTE — TELEPHONE ENCOUNTER
Ramon Shepard is calling to request a refill on the following medication(s):    Last Visit Date (If Applicable):  7/49/9220    Next Visit Date:    Visit date not found    Medication Request:  Requested Prescriptions     Pending Prescriptions Disp Refills    insulin glargine, 1 unit dial, (TOUJEO SOLOSTAR) 300 UNIT/ML concentrated injection pen [Pharmacy Med Name: Itzel Dolly 300 UNIT/ML] 7.5 mL 0     Sig: INJECT 50 UNITS UNDER THE SKIN EVERY NIGHT AT BEDTIME

## 2023-08-14 ENCOUNTER — TELEPHONE (OUTPATIENT)
Dept: PHARMACY | Facility: CLINIC | Age: 73
End: 2023-08-14

## 2023-08-14 NOTE — TELEPHONE ENCOUNTER
[facility] CL[facility]ARMAC[facility]: STATIN THERAPY REVIEW  Identified statin use in persons with diabetes care gap per [facility]. Records dated: 8/7/23. Last Visit: 4/26/23   Next Visit: to be scheduled (due @Oct)       STATIN GAP IDENTIFIED    Patient is prescribed: atorvastatin 10mg daily  Last rx'd 12/3/21 for #90, 3 refills   Per Reconcile Dispense History and Insurer Portal: last filled on 11/2/22 for 90 day supply (90ds fills 12/4/21, 4/11/22, 7/19/22, 11/2/22 - 0 refills remain). Lab Results   Component Value Date    CHOL 134 10/12/2022    TRIG 88 10/12/2022    HDL 55 10/12/2022    LDLCHOLESTEROL 61 10/12/2022    LDLCALC 121 06/20/2018     ALT   Date Value Ref Range Status   10/12/2022 18 5 - 33 U/L Final     AST   Date Value Ref Range Status   10/12/2022 19 <32 U/L Final     The 10-year ASCVD risk score (Junior KOVACS, et al., 2019) is: 24.1%    Values used to calculate the score:      Age: 72 years      Sex: Female      Is Non- : Yes      Diabetic: Yes      Tobacco smoker: No      Systolic Blood Pressure: 722 mmHg      Is BP treated: No      HDL Cholesterol: 55 mg/dL      Total Cholesterol: 134 mg/dL     PLAN  The following are interventions that have been identified:  Statin Gap (Diabetes): atorvastatin 10mg daily on current medication list, but out of refills and last refilled 11/2/22 - need reordered to [facility] mail pharmacy)? Attempting to reach patient to review. Left message asking for return call. Letter sent to patient.     Anastacia Lin, PharmD, [address] [place] [address], toll free: [phone], option 1    =======================================================  For Pharmacy Admin Tracking Only    Program: Fabiola in place:  No  Gap Closed?: No   Time Spent (min): 15

## 2023-08-24 DIAGNOSIS — M85.80 OSTEOPENIA, UNSPECIFIED LOCATION: ICD-10-CM

## 2023-08-24 NOTE — TELEPHONE ENCOUNTER
Clarence Maximiliano is calling to request a refill on the following medication(s):    Last Visit Date (If Applicable):  2/77/4887    Next Visit Date:    Visit date not found    Medication Request:  Requested Prescriptions     Pending Prescriptions Disp Refills    atorvastatin (LIPITOR) 10 MG tablet [Pharmacy Med Name: ATORVASTATIN CALCIUM 10 MG Tablet] 90 tablet 3     Sig: TAKE 1 TABLET EVERY DAY

## 2023-08-25 RX ORDER — ALENDRONATE SODIUM 70 MG/1
TABLET ORAL
Qty: 12 TABLET | Refills: 3 | Status: SHIPPED | OUTPATIENT
Start: 2023-08-25

## 2023-08-25 RX ORDER — ATORVASTATIN CALCIUM 10 MG/1
TABLET, FILM COATED ORAL
Qty: 90 TABLET | Refills: 3 | Status: SHIPPED | OUTPATIENT
Start: 2023-08-25

## 2023-10-02 DIAGNOSIS — Z79.4 TYPE 2 DIABETES MELLITUS WITH HYPERGLYCEMIA, WITH LONG-TERM CURRENT USE OF INSULIN (HCC): ICD-10-CM

## 2023-10-02 DIAGNOSIS — E11.65 TYPE 2 DIABETES MELLITUS WITH HYPERGLYCEMIA, WITH LONG-TERM CURRENT USE OF INSULIN (HCC): ICD-10-CM

## 2023-10-02 NOTE — TELEPHONE ENCOUNTER
Angelika Krueger is calling to request a refill on the following medication(s):    Last Visit Date (If Applicable):  6/68/8029    Next Visit Date:    10/9/2023    Medication Request:  Requested Prescriptions     Pending Prescriptions Disp Refills    DROPLET PEN NEEDLES 32G X 4 MM MISC [Pharmacy Med Name: DROPLET PEN NEEDLES [id number] 32G X 4 MM] 100 each 3     Sig: USE TO INJECT UNDER THE SKIN EVERY DAY

## 2023-10-04 RX ORDER — PEN NEEDLE, DIABETIC 32GX 5/32"
NEEDLE, DISPOSABLE MISCELLANEOUS
Qty: 100 EACH | Refills: 3 | Status: SHIPPED | OUTPATIENT
Start: 2023-10-04 | End: 2024-01-04

## 2023-10-09 ENCOUNTER — OFFICE VISIT (OUTPATIENT)
Dept: FAMILY MEDICINE CLINIC | Age: 73
End: 2023-10-09
Payer: MEDICARE

## 2023-10-09 VITALS
SYSTOLIC BLOOD PRESSURE: 158 MMHG | HEART RATE: 65 BPM | BODY MASS INDEX: 25.18 KG/M2 | HEIGHT: 69 IN | OXYGEN SATURATION: 98 % | WEIGHT: 170 LBS | DIASTOLIC BLOOD PRESSURE: 81 MMHG

## 2023-10-09 DIAGNOSIS — E55.9 VITAMIN D DEFICIENCY: ICD-10-CM

## 2023-10-09 DIAGNOSIS — E78.00 PURE HYPERCHOLESTEROLEMIA: ICD-10-CM

## 2023-10-09 DIAGNOSIS — Z79.4 TYPE 2 DIABETES MELLITUS WITH HYPERGLYCEMIA, WITH LONG-TERM CURRENT USE OF INSULIN (HCC): ICD-10-CM

## 2023-10-09 DIAGNOSIS — K21.00 GASTROESOPHAGEAL REFLUX DISEASE WITH ESOPHAGITIS WITHOUT HEMORRHAGE: ICD-10-CM

## 2023-10-09 DIAGNOSIS — D50.9 IRON DEFICIENCY ANEMIA, UNSPECIFIED IRON DEFICIENCY ANEMIA TYPE: ICD-10-CM

## 2023-10-09 DIAGNOSIS — E03.9 ACQUIRED HYPOTHYROIDISM: ICD-10-CM

## 2023-10-09 DIAGNOSIS — Z12.31 SCREENING MAMMOGRAM, ENCOUNTER FOR: ICD-10-CM

## 2023-10-09 DIAGNOSIS — R60.0 EDEMA OF BOTH LEGS: ICD-10-CM

## 2023-10-09 DIAGNOSIS — Z78.0 POST-MENOPAUSAL: ICD-10-CM

## 2023-10-09 DIAGNOSIS — E11.65 TYPE 2 DIABETES MELLITUS WITH HYPERGLYCEMIA, WITH LONG-TERM CURRENT USE OF INSULIN (HCC): ICD-10-CM

## 2023-10-09 DIAGNOSIS — I10 PRIMARY HYPERTENSION: ICD-10-CM

## 2023-10-09 DIAGNOSIS — M85.80 OSTEOPENIA, UNSPECIFIED LOCATION: ICD-10-CM

## 2023-10-09 DIAGNOSIS — Z00.00 MEDICARE ANNUAL WELLNESS VISIT, SUBSEQUENT: Primary | ICD-10-CM

## 2023-10-09 LAB — HBA1C MFR BLD: 7.1 %

## 2023-10-09 PROCEDURE — 3051F HG A1C>EQUAL 7.0%<8.0%: CPT | Performed by: FAMILY MEDICINE

## 2023-10-09 PROCEDURE — 3079F DIAST BP 80-89 MM HG: CPT | Performed by: FAMILY MEDICINE

## 2023-10-09 PROCEDURE — 1124F ACP DISCUSS-NO DSCNMKR DOCD: CPT | Performed by: FAMILY MEDICINE

## 2023-10-09 PROCEDURE — 3077F SYST BP >= 140 MM HG: CPT | Performed by: FAMILY MEDICINE

## 2023-10-09 PROCEDURE — 83036 HEMOGLOBIN GLYCOSYLATED A1C: CPT | Performed by: FAMILY MEDICINE

## 2023-10-09 PROCEDURE — 3017F COLORECTAL CA SCREEN DOC REV: CPT | Performed by: FAMILY MEDICINE

## 2023-10-09 PROCEDURE — G0439 PPPS, SUBSEQ VISIT: HCPCS | Performed by: FAMILY MEDICINE

## 2023-10-09 PROCEDURE — G8484 FLU IMMUNIZE NO ADMIN: HCPCS | Performed by: FAMILY MEDICINE

## 2023-10-09 SDOH — ECONOMIC STABILITY: FOOD INSECURITY: WITHIN THE PAST 12 MONTHS, THE FOOD YOU BOUGHT JUST DIDN'T LAST AND YOU DIDN'T HAVE MONEY TO GET MORE.: NEVER TRUE

## 2023-10-09 SDOH — ECONOMIC STABILITY: INCOME INSECURITY: HOW HARD IS IT FOR YOU TO PAY FOR THE VERY BASICS LIKE FOOD, HOUSING, MEDICAL CARE, AND HEATING?: NOT HARD AT ALL

## 2023-10-09 SDOH — ECONOMIC STABILITY: FOOD INSECURITY: WITHIN THE PAST 12 MONTHS, YOU WORRIED THAT YOUR FOOD WOULD RUN OUT BEFORE YOU GOT MONEY TO BUY MORE.: NEVER TRUE

## 2023-10-09 ASSESSMENT — LIFESTYLE VARIABLES
HOW OFTEN DO YOU HAVE A DRINK CONTAINING ALCOHOL: NEVER
HOW MANY STANDARD DRINKS CONTAINING ALCOHOL DO YOU HAVE ON A TYPICAL DAY: PATIENT DOES NOT DRINK

## 2023-10-09 ASSESSMENT — PATIENT HEALTH QUESTIONNAIRE - PHQ9
SUM OF ALL RESPONSES TO PHQ QUESTIONS 1-9: 0
1. LITTLE INTEREST OR PLEASURE IN DOING THINGS: 0
SUM OF ALL RESPONSES TO PHQ QUESTIONS 1-9: 0
2. FEELING DOWN, DEPRESSED OR HOPELESS: 0
SUM OF ALL RESPONSES TO PHQ9 QUESTIONS 1 & 2: 0

## 2023-10-10 NOTE — PROGRESS NOTES
Medicare Annual Wellness Visit    Mari Bloomri is here for Tyler DUNCAN (Annual Wellness Visit )    Assessment & Plan   Medicare annual wellness visit, subsequent  -     POCT glycosylated hemoglobin (Hb A1C)  Type 2 diabetes mellitus with hyperglycemia, with long-term current use of insulin (HCC)  -     CBC with Auto Differential; Future  -     Comprehensive Metabolic Panel; Future  -     Lipid Panel; Future  -     T4, Free; Future  -     TSH; Future  -     Vitamin D 25 Hydroxy; Future  -     Iron and TIBC; Future  -     Vitamin B12 & Folate; Future  -     POCT glycosylated hemoglobin (Hb A1C)  Edema of both legs  -     CBC with Auto Differential; Future  -     Comprehensive Metabolic Panel; Future  -     Lipid Panel; Future  -     T4, Free; Future  -     TSH; Future  -     Vitamin D 25 Hydroxy; Future  -     Iron and TIBC; Future  -     Vitamin B12 & Folate; Future  Pure hypercholesterolemia  -     CBC with Auto Differential; Future  -     Comprehensive Metabolic Panel; Future  -     Lipid Panel; Future  -     T4, Free; Future  -     TSH; Future  -     Vitamin D 25 Hydroxy; Future  -     Iron and TIBC; Future  -     Vitamin B12 & Folate; Future  Acquired hypothyroidism  -     CBC with Auto Differential; Future  -     Comprehensive Metabolic Panel; Future  -     Lipid Panel; Future  -     T4, Free; Future  -     TSH; Future  -     Vitamin D 25 Hydroxy; Future  -     Iron and TIBC; Future  -     Vitamin B12 & Folate; Future  Iron deficiency anemia, unspecified iron deficiency anemia type  -     CBC with Auto Differential; Future  -     Comprehensive Metabolic Panel; Future  -     Lipid Panel; Future  -     T4, Free; Future  -     TSH; Future  -     Vitamin D 25 Hydroxy; Future  -     Iron and TIBC; Future  -     Vitamin B12 & Folate; Future  Gastroesophageal reflux disease with esophagitis without hemorrhage  -     CBC with Auto Differential; Future  -     Comprehensive Metabolic Panel;  Future  -     Lipid

## 2023-10-12 NOTE — TELEPHONE ENCOUNTER
Adilene Kaur is calling to request a refill on the following medication(s):    Medication Request:  Requested Prescriptions     Pending Prescriptions Disp Refills    Alcohol Swabs (DROPSAFE ALCOHOL PREP) 70 % PADS [Pharmacy Med Name: DROPSAFE ALCOHOL PREP PADS 70 % Pad]  10     Sig: USE THREE TIMES DAILY       Last Visit Date (If Applicable):  72/9/8077    Next Visit Date:    Visit date not found

## 2023-10-13 RX ORDER — ISOPROPYL ALCOHOL 70 ML/100ML
SWAB TOPICAL
Qty: 100 EACH | Refills: 10 | Status: SHIPPED | OUTPATIENT
Start: 2023-10-13

## 2023-10-20 RX ORDER — INSULIN GLARGINE 300 U/ML
INJECTION, SOLUTION SUBCUTANEOUS
Qty: 7.5 ML | Refills: 0 | Status: SHIPPED | OUTPATIENT
Start: 2023-10-20

## 2023-10-20 NOTE — TELEPHONE ENCOUNTER
Zackary Wesley is calling to request a refill on the following medication(s):    Last Visit Date (If Applicable):  76/9/6485    Next Visit Date:    Visit date not found    Medication Request:  Requested Prescriptions     Pending Prescriptions Disp Refills    TOUJEO SOLOSTAR 300 UNIT/ML concentrated injection pen [Pharmacy Med Name: Alok Solis 300 UNIT/ML] 7.5 mL 0     Sig: INJECT 50 UNITS UNDER THE SKIN EVERY NIGHT AT BEDTIME

## 2023-10-26 ENCOUNTER — HOSPITAL ENCOUNTER (OUTPATIENT)
Age: 73
Discharge: HOME | End: 2023-10-26
Payer: MEDICARE

## 2023-10-26 DIAGNOSIS — Z79.4 TYPE 2 DIABETES MELLITUS WITH HYPERGLYCEMIA, WITH LONG-TERM CURRENT USE OF INSULIN (HCC): ICD-10-CM

## 2023-10-26 DIAGNOSIS — E78.00 PURE HYPERCHOLESTEROLEMIA: ICD-10-CM

## 2023-10-26 DIAGNOSIS — E11.65 TYPE 2 DIABETES MELLITUS WITH HYPERGLYCEMIA, WITH LONG-TERM CURRENT USE OF INSULIN (HCC): ICD-10-CM

## 2023-10-26 DIAGNOSIS — R60.0 EDEMA OF BOTH LEGS: ICD-10-CM

## 2023-10-26 DIAGNOSIS — K21.00 GASTROESOPHAGEAL REFLUX DISEASE WITH ESOPHAGITIS WITHOUT HEMORRHAGE: ICD-10-CM

## 2023-10-26 DIAGNOSIS — I10 PRIMARY HYPERTENSION: ICD-10-CM

## 2023-10-26 DIAGNOSIS — D50.9 IRON DEFICIENCY ANEMIA, UNSPECIFIED IRON DEFICIENCY ANEMIA TYPE: ICD-10-CM

## 2023-10-26 DIAGNOSIS — E55.9 VITAMIN D DEFICIENCY: ICD-10-CM

## 2023-10-26 DIAGNOSIS — E03.9 ACQUIRED HYPOTHYROIDISM: ICD-10-CM

## 2023-10-26 LAB
25(OH)D3 SERPL-MCNC: 30.1 NG/ML
ALBUMIN SERPL-MCNC: 4 G/DL (ref 3.5–5.2)
ALBUMIN/GLOB SERPL: 1.6 {RATIO} (ref 1–2.5)
ALP SERPL-CCNC: 85 U/L (ref 35–104)
ALT SERPL-CCNC: 13 U/L (ref 5–33)
ANION GAP SERPL CALCULATED.3IONS-SCNC: 7 MMOL/L (ref 9–17)
AST SERPL-CCNC: 15 U/L
BASOPHILS # BLD: <0.03 K/UL (ref 0–0.2)
BASOPHILS NFR BLD: 0 % (ref 0–2)
BILIRUB SERPL-MCNC: 1 MG/DL (ref 0.3–1.2)
BUN SERPL-MCNC: 24 MG/DL (ref 8–23)
CALCIUM SERPL-MCNC: 8.9 MG/DL (ref 8.6–10.4)
CHLORIDE SERPL-SCNC: 96 MMOL/L (ref 98–107)
CHOLEST SERPL-MCNC: 147 MG/DL
CHOLESTEROL/HDL RATIO: 2.4
CO2 SERPL-SCNC: 31 MMOL/L (ref 20–31)
CREAT SERPL-MCNC: 0.8 MG/DL (ref 0.5–0.9)
EOSINOPHIL # BLD: 0.06 K/UL (ref 0–0.44)
EOSINOPHILS RELATIVE PERCENT: 2 % (ref 1–4)
ERYTHROCYTE [DISTWIDTH] IN BLOOD BY AUTOMATED COUNT: 12.4 % (ref 11.8–14.4)
FOLATE SERPL-MCNC: >20 NG/ML
GFR SERPL CREATININE-BSD FRML MDRD: >60 ML/MIN/1.73M2
GLUCOSE SERPL-MCNC: 213 MG/DL (ref 70–99)
HCT VFR BLD AUTO: 32.8 % (ref 36.3–47.1)
HDLC SERPL-MCNC: 62 MG/DL
HGB BLD-MCNC: 10.8 G/DL (ref 11.9–15.1)
IMM GRANULOCYTES # BLD AUTO: <0.03 K/UL (ref 0–0.3)
IMM GRANULOCYTES NFR BLD: 0 %
IRON SATN MFR SERPL: 28 % (ref 20–55)
IRON SERPL-MCNC: 64 UG/DL (ref 37–145)
LDLC SERPL CALC-MCNC: 70 MG/DL (ref 0–130)
LYMPHOCYTES NFR BLD: 1.56 K/UL (ref 1.1–3.7)
LYMPHOCYTES RELATIVE PERCENT: 50 % (ref 24–43)
MCH RBC QN AUTO: 29.5 PG (ref 25.2–33.5)
MCHC RBC AUTO-ENTMCNC: 32.9 G/DL (ref 28.4–34.8)
MCV RBC AUTO: 89.6 FL (ref 82.6–102.9)
MONOCYTES NFR BLD: 0.29 K/UL (ref 0.1–1.2)
MONOCYTES NFR BLD: 9 % (ref 3–12)
NEUTROPHILS NFR BLD: 39 % (ref 36–65)
NEUTS SEG NFR BLD: 1.25 K/UL (ref 1.5–8.1)
NRBC BLD-RTO: 0 PER 100 WBC
PLATELET # BLD AUTO: 161 K/UL (ref 138–453)
PMV BLD AUTO: 12.7 FL (ref 8.1–13.5)
POTASSIUM SERPL-SCNC: 4.6 MMOL/L (ref 3.7–5.3)
PROT SERPL-MCNC: 6.5 G/DL (ref 6.4–8.3)
RBC # BLD AUTO: 3.66 M/UL (ref 3.95–5.11)
SODIUM SERPL-SCNC: 134 MMOL/L (ref 135–144)
T4 FREE SERPL-MCNC: 1.2 NG/DL (ref 0.9–1.7)
TIBC SERPL-MCNC: 231 UG/DL (ref 250–450)
TRIGL SERPL-MCNC: 75 MG/DL
TSH SERPL DL<=0.05 MIU/L-ACNC: 0.87 UIU/ML (ref 0.3–5)
UNSATURATED IRON BINDING CAPACITY: 167 UG/DL (ref 112–347)
VIT B12 SERPL-MCNC: 1084 PG/ML (ref 232–1245)
WBC OTHER # BLD: 3.2 K/UL (ref 3.5–11.3)

## 2023-10-26 PROCEDURE — 83540 ASSAY OF IRON: CPT

## 2023-10-26 PROCEDURE — 80053 COMPREHEN METABOLIC PANEL: CPT

## 2023-10-26 PROCEDURE — 84439 ASSAY OF FREE THYROXINE: CPT

## 2023-10-26 PROCEDURE — 84443 ASSAY THYROID STIM HORMONE: CPT

## 2023-10-26 PROCEDURE — 85025 COMPLETE CBC W/AUTO DIFF WBC: CPT

## 2023-10-26 PROCEDURE — 36415 COLL VENOUS BLD VENIPUNCTURE: CPT

## 2023-10-26 PROCEDURE — 82306 VITAMIN D 25 HYDROXY: CPT

## 2023-10-26 PROCEDURE — 80061 LIPID PANEL: CPT

## 2023-10-26 PROCEDURE — 82607 VITAMIN B-12: CPT

## 2023-10-26 PROCEDURE — 83550 IRON BINDING TEST: CPT

## 2023-10-26 PROCEDURE — 82746 ASSAY OF FOLIC ACID SERUM: CPT

## 2023-11-07 DIAGNOSIS — R60.0 EDEMA OF BOTH LEGS: ICD-10-CM

## 2023-11-07 NOTE — TELEPHONE ENCOUNTER
Isaac Pop is calling to request a refill on the following medication(s):    Medication Request:  Requested Prescriptions     Pending Prescriptions Disp Refills    furosemide (LASIX) 40 MG tablet [Pharmacy Med Name: FUROSEMIDE 40 MG Tablet] 90 tablet 10     Sig: TAKE 1 TABLET EVERY DAY    metoclopramide (REGLAN) 5 MG tablet [Pharmacy Med Name: METOCLOPRAMIDE HCL 5 MG Tablet] 90 tablet 10     Sig: TAKE 1 TABLET EVERY DAY       Last Visit Date (If Applicable):  23/6/3333    Next Visit Date:    Visit date not found

## 2023-11-08 RX ORDER — FUROSEMIDE 40 MG/1
TABLET ORAL
Qty: 90 TABLET | Refills: 10 | Status: SHIPPED | OUTPATIENT
Start: 2023-11-08

## 2023-11-08 RX ORDER — METOCLOPRAMIDE 5 MG/1
TABLET ORAL
Qty: 90 TABLET | Refills: 10 | Status: SHIPPED | OUTPATIENT
Start: 2023-11-08

## 2024-01-15 RX ORDER — INSULIN GLARGINE 300 U/ML
INJECTION, SOLUTION SUBCUTANEOUS
Qty: 7.5 ML | Refills: 11 | Status: SHIPPED | OUTPATIENT
Start: 2024-01-15

## 2024-01-15 NOTE — TELEPHONE ENCOUNTER
Lauren Lerma is calling to request a refill on the following medication(s):    Last Visit Date (If Applicable):  10/9/2023    Next Visit Date:    Visit date not found    Medication Request:  Requested Prescriptions     Pending Prescriptions Disp Refills    TOUJEO SOLOSTAR 300 UNIT/ML concentrated injection pen [Pharmacy Med Name: TOUJEO SOLOSTAR 300 UNIT/ML] 7.5 mL 0     Sig: INJECT 50 UNITS UNDER THE SKIN EVERY NIGHT AT BEDTIME

## 2024-02-19 DIAGNOSIS — E11.65 TYPE 2 DIABETES MELLITUS WITH HYPERGLYCEMIA, WITH LONG-TERM CURRENT USE OF INSULIN (HCC): ICD-10-CM

## 2024-02-19 DIAGNOSIS — Z79.4 TYPE 2 DIABETES MELLITUS WITH HYPERGLYCEMIA, WITH LONG-TERM CURRENT USE OF INSULIN (HCC): ICD-10-CM

## 2024-02-19 RX ORDER — INSULIN GLARGINE 300 U/ML
INJECTION, SOLUTION SUBCUTANEOUS
Qty: 1.5 ML | Refills: 0 | Status: SHIPPED | OUTPATIENT
Start: 2024-02-19

## 2024-02-19 NOTE — TELEPHONE ENCOUNTER
Lauren Lerma is calling to request a refill on the following medication(s):    Last Visit Date (If Applicable):  10/9/2023    Next Visit Date:    Visit date not found    Medication Request:  Requested Prescriptions     Pending Prescriptions Disp Refills    TOUJEO SOLOSTAR 300 UNIT/ML concentrated injection pen [Pharmacy Med Name: TOUJEO SOLOSTAR 300 UNIT/ML] 1.5 mL 0     Sig: INJECT 50 UNITS UNDER THE SKIN AT BEDTIME

## 2024-02-28 ENCOUNTER — TELEPHONE (OUTPATIENT)
Dept: FAMILY MEDICINE CLINIC | Age: 74
End: 2024-02-28

## 2024-02-28 DIAGNOSIS — E11.65 TYPE 2 DIABETES MELLITUS WITH HYPERGLYCEMIA, WITH LONG-TERM CURRENT USE OF INSULIN (HCC): ICD-10-CM

## 2024-02-28 DIAGNOSIS — Z79.4 TYPE 2 DIABETES MELLITUS WITH HYPERGLYCEMIA, WITH LONG-TERM CURRENT USE OF INSULIN (HCC): ICD-10-CM

## 2024-02-28 RX ORDER — INSULIN GLARGINE 300 U/ML
INJECTION, SOLUTION SUBCUTANEOUS
Qty: 3 EACH | Refills: 3 | Status: SHIPPED | OUTPATIENT
Start: 2024-02-28

## 2024-02-28 NOTE — TELEPHONE ENCOUNTER
Patient called in stating she went to the pharmacy to get her medication toujeo and she paid $35 for 1 pen when she usually gets 3 for $35      Please advise

## 2024-03-14 ENCOUNTER — TELEPHONE (OUTPATIENT)
Dept: FAMILY MEDICINE CLINIC | Age: 74
End: 2024-03-14

## 2024-03-14 NOTE — TELEPHONE ENCOUNTER
Patient called in stating she will be dropping off some [employer] forms to be completed and she mentioned these have to be completed by March 25th      Please advise   Cardiac

## 2024-05-13 ENCOUNTER — TELEPHONE (OUTPATIENT)
Dept: FAMILY MEDICINE CLINIC | Age: 74
End: 2024-05-13

## 2024-05-13 NOTE — TELEPHONE ENCOUNTER
Patient called in stating last Tuesday oh sent a nurse to her  home for a medicare visit      Please advise

## 2024-05-13 NOTE — TELEPHONE ENCOUNTER
I do not think they count is Medicare visits towards me but that is fine because is just the nurse invasion 1 has not a doctor visit

## 2024-05-30 DIAGNOSIS — Z79.4 TYPE 2 DIABETES MELLITUS WITH HYPERGLYCEMIA, WITH LONG-TERM CURRENT USE OF INSULIN (HCC): ICD-10-CM

## 2024-05-30 DIAGNOSIS — E11.65 TYPE 2 DIABETES MELLITUS WITH HYPERGLYCEMIA, WITH LONG-TERM CURRENT USE OF INSULIN (HCC): ICD-10-CM

## 2024-05-31 DIAGNOSIS — Z79.4 TYPE 2 DIABETES MELLITUS WITH HYPERGLYCEMIA, WITH LONG-TERM CURRENT USE OF INSULIN (HCC): ICD-10-CM

## 2024-05-31 DIAGNOSIS — E11.65 TYPE 2 DIABETES MELLITUS WITH HYPERGLYCEMIA, WITH LONG-TERM CURRENT USE OF INSULIN (HCC): ICD-10-CM

## 2024-05-31 RX ORDER — BLOOD SUGAR DIAGNOSTIC
1 STRIP MISCELLANEOUS 3 TIMES DAILY
Qty: 450 STRIP | Refills: 0 | Status: SHIPPED | OUTPATIENT
Start: 2024-05-31

## 2024-05-31 RX ORDER — PEN NEEDLE, DIABETIC 32GX 5/32"
1 NEEDLE, DISPOSABLE MISCELLANEOUS 3 TIMES DAILY
Qty: 300 EACH | Refills: 0 | Status: SHIPPED | OUTPATIENT
Start: 2024-05-31

## 2024-05-31 RX ORDER — LANCETS
EACH MISCELLANEOUS
Qty: 500 EACH | Refills: 0 | Status: SHIPPED | OUTPATIENT
Start: 2024-05-31

## 2024-05-31 RX ORDER — ISOPROPYL ALCOHOL 0.75 G/1
SWAB TOPICAL
Qty: 500 EACH | Refills: 3 | Status: SHIPPED | OUTPATIENT
Start: 2024-05-31

## 2024-05-31 RX ORDER — BLOOD SUGAR DIAGNOSTIC
STRIP MISCELLANEOUS
Qty: 450 STRIP | Refills: 3 | Status: SHIPPED | OUTPATIENT
Start: 2024-05-31

## 2024-05-31 NOTE — TELEPHONE ENCOUNTER
Lauren Lerma is calling to request a refill on the following medication(s):    Last Visit Date (If Applicable):  10/9/2023    Next Visit Date:    6/3/2024    Medication Request:  Requested Prescriptions     Pending Prescriptions Disp Refills    blood glucose test strips (ACCU-CHEK GUIDE) strip [Pharmacy Med Name: ACCU-CHEK GUIDE   Strip] 450 strip 3     Sig: USE TO TEST FOUR TIMES DAILY AND AS NEEDED FOR SYMPTOMS OF IRREGULAR BLOOD GLUCOSE

## 2024-06-03 ENCOUNTER — OFFICE VISIT (OUTPATIENT)
Dept: FAMILY MEDICINE CLINIC | Age: 74
End: 2024-06-03
Payer: MEDICARE

## 2024-06-03 VITALS
SYSTOLIC BLOOD PRESSURE: 131 MMHG | HEIGHT: 69 IN | HEART RATE: 66 BPM | OXYGEN SATURATION: 97 % | DIASTOLIC BLOOD PRESSURE: 69 MMHG | BODY MASS INDEX: 24.29 KG/M2 | WEIGHT: 164 LBS

## 2024-06-03 DIAGNOSIS — Z00.00 MEDICARE ANNUAL WELLNESS VISIT, SUBSEQUENT: Primary | ICD-10-CM

## 2024-06-03 DIAGNOSIS — Z79.4 TYPE 2 DIABETES MELLITUS WITH HYPERGLYCEMIA, WITH LONG-TERM CURRENT USE OF INSULIN (HCC): ICD-10-CM

## 2024-06-03 DIAGNOSIS — L97.512 RIGHT FOOT ULCER, WITH FAT LAYER EXPOSED (HCC): ICD-10-CM

## 2024-06-03 DIAGNOSIS — E11.42 DIABETIC POLYNEUROPATHY ASSOCIATED WITH TYPE 2 DIABETES MELLITUS (HCC): ICD-10-CM

## 2024-06-03 DIAGNOSIS — E11.65 TYPE 2 DIABETES MELLITUS WITH HYPERGLYCEMIA, WITH LONG-TERM CURRENT USE OF INSULIN (HCC): ICD-10-CM

## 2024-06-03 LAB — HBA1C MFR BLD: 6.9 %

## 2024-06-03 PROCEDURE — 1124F ACP DISCUSS-NO DSCNMKR DOCD: CPT | Performed by: FAMILY MEDICINE

## 2024-06-03 PROCEDURE — 3044F HG A1C LEVEL LT 7.0%: CPT | Performed by: FAMILY MEDICINE

## 2024-06-03 PROCEDURE — 3017F COLORECTAL CA SCREEN DOC REV: CPT | Performed by: FAMILY MEDICINE

## 2024-06-03 PROCEDURE — G0439 PPPS, SUBSEQ VISIT: HCPCS | Performed by: FAMILY MEDICINE

## 2024-06-03 PROCEDURE — 83036 HEMOGLOBIN GLYCOSYLATED A1C: CPT | Performed by: FAMILY MEDICINE

## 2024-06-03 ASSESSMENT — PATIENT HEALTH QUESTIONNAIRE - PHQ9
SUM OF ALL RESPONSES TO PHQ9 QUESTIONS 1 & 2: 0
2. FEELING DOWN, DEPRESSED OR HOPELESS: NOT AT ALL
SUM OF ALL RESPONSES TO PHQ QUESTIONS 1-9: 0
1. LITTLE INTEREST OR PLEASURE IN DOING THINGS: NOT AT ALL
SUM OF ALL RESPONSES TO PHQ QUESTIONS 1-9: 0

## 2024-06-03 NOTE — PATIENT INSTRUCTIONS
[email or web address] and enter F075 to learn more about \"A Healthy Heart: Care Instructions.\"  Current as of: June 24, 2023               Content Version: 14.0  © 5066-3341 [employer].   Care instructions adapted under license by [employer]. If you have questions about a medical condition or this instruction, always ask your healthcare professional. [employer] disclaims any warranty or liability for your use of this information.      Personalized Preventive Plan for Lauren Lerma - 6/3/2024  Medicare offers a range of preventive health benefits. Some of the tests and screenings are paid in full while other may be subject to a deductible, co-insurance, and/or copay.    Some of these benefits include a comprehensive review of your medical history including lifestyle, illnesses that may run in your family, and various assessments and screenings as appropriate.    After reviewing your medical record and screening and assessments performed today your provider may have ordered immunizations, labs, imaging, and/or referrals for you.  A list of these orders (if applicable) as well as your Preventive Care list are included within your After Visit Summary for your review.    Other Preventive Recommendations:    A preventive eye exam performed by an eye specialist is recommended every 1-2 years to screen for glaucoma; cataracts, macular degeneration, and other eye disorders.  A preventive dental visit is recommended every 6 months.  Try to get at least 150 minutes of exercise per week or 10,000 steps per day on a pedometer .  Order or download the FREE \"Exercise & Physical Activity: Your Everyday Guide\" from [employer]. Call 1-869.114.6036 or search [employer] online.  You need 4528-9002 mg of calcium and 0298-1855 IU of vitamin D per day. It is possible to meet your calcium requirement with diet alone, but a vitamin D supplement is

## 2024-10-07 DIAGNOSIS — R60.0 EDEMA OF BOTH LEGS: ICD-10-CM

## 2024-10-07 NOTE — TELEPHONE ENCOUNTER
Lauren Lerma is calling to request a refill on the following medication(s):    Last Visit Date (If Applicable):  6/3/2024    Next Visit Date:    Visit date not found    Medication Request:  Requested Prescriptions     Pending Prescriptions Disp Refills    atorvastatin (LIPITOR) 10 MG tablet [Pharmacy Med Name: ATORVASTATIN 10MG TAB] 90 tablet 0    furosemide (LASIX) 40 MG tablet [Pharmacy Med Name: FUROSEMIDE 40MG TAB] 90 tablet 0    metoclopramide (REGLAN) 5 MG tablet [Pharmacy Med Name: METOCLOPRAMIDE  5MG TAB] 90 tablet 0     Sig: Take 1 tablet by mouth 4 times daily

## 2024-10-08 RX ORDER — FUROSEMIDE 40 MG
TABLET ORAL
Qty: 90 TABLET | Refills: 0 | Status: SHIPPED | OUTPATIENT
Start: 2024-10-08

## 2024-10-08 RX ORDER — ATORVASTATIN CALCIUM 10 MG/1
TABLET, FILM COATED ORAL
Qty: 90 TABLET | Refills: 0 | Status: SHIPPED | OUTPATIENT
Start: 2024-10-08

## 2024-10-08 RX ORDER — METOCLOPRAMIDE 5 MG/1
5 TABLET ORAL 4 TIMES DAILY
Qty: 90 TABLET | Refills: 0 | Status: SHIPPED | OUTPATIENT
Start: 2024-10-08

## 2024-10-16 DIAGNOSIS — E11.65 TYPE 2 DIABETES MELLITUS WITH HYPERGLYCEMIA, WITH LONG-TERM CURRENT USE OF INSULIN (HCC): ICD-10-CM

## 2024-10-16 DIAGNOSIS — Z79.4 TYPE 2 DIABETES MELLITUS WITH HYPERGLYCEMIA, WITH LONG-TERM CURRENT USE OF INSULIN (HCC): ICD-10-CM

## 2024-10-16 RX ORDER — INSULIN GLARGINE 300 U/ML
INJECTION, SOLUTION SUBCUTANEOUS
Qty: 4.5 ML | Refills: 3 | Status: SHIPPED | OUTPATIENT
Start: 2024-10-16

## 2024-10-28 DIAGNOSIS — Z79.4 TYPE 2 DIABETES MELLITUS WITH HYPERGLYCEMIA, WITH LONG-TERM CURRENT USE OF INSULIN (HCC): ICD-10-CM

## 2024-10-28 DIAGNOSIS — E11.65 TYPE 2 DIABETES MELLITUS WITH HYPERGLYCEMIA, WITH LONG-TERM CURRENT USE OF INSULIN (HCC): ICD-10-CM

## 2024-10-29 RX ORDER — PEN NEEDLE, DIABETIC 32GX 5/32"
NEEDLE, DISPOSABLE MISCELLANEOUS
Qty: 300 EACH | Refills: 3 | Status: SHIPPED | OUTPATIENT
Start: 2024-10-29

## 2024-10-29 NOTE — TELEPHONE ENCOUNTER
Lauren Lerma is calling to request a refill on the following medication(s):    Last Visit Date (If Applicable):  6/3/2024    Next Visit Date:    Visit date not found    Medication Request:  Requested Prescriptions     Pending Prescriptions Disp Refills    DROPLET PEN NEEDLES 32G X 4 MM MISC [Pharmacy Med Name: Droplet Pen Needles Miscellaneous 32G X 4 MM] 300 each 3     Sig: USE TO INJECT INTO THE SKIN 3 TIMES DAILY

## 2024-12-02 DIAGNOSIS — R60.0 EDEMA OF BOTH LEGS: ICD-10-CM

## 2024-12-02 RX ORDER — FUROSEMIDE 40 MG/1
TABLET ORAL
Qty: 90 TABLET | Refills: 0 | Status: SHIPPED | OUTPATIENT
Start: 2024-12-02

## 2024-12-02 RX ORDER — METOCLOPRAMIDE 5 MG/1
TABLET ORAL
Qty: 90 TABLET | Refills: 0 | Status: SHIPPED | OUTPATIENT
Start: 2024-12-02

## 2024-12-02 NOTE — TELEPHONE ENCOUNTER
Lauren Lerma is calling to request a refill on the following medication(s):    Last Visit Date (If Applicable):  6/3/2024    Next Visit Date:    12/2/2024    Medication Request:  Requested Prescriptions     Pending Prescriptions Disp Refills    furosemide (LASIX) 40 MG tablet [Pharmacy Med Name: FUROSEMIDE 40MG TAB] 90 tablet 0

## 2024-12-02 NOTE — TELEPHONE ENCOUNTER
Lauren Lerma is calling to request a refill on the following medication(s):    Last Visit Date (If Applicable):  6/3/2024    Next Visit Date:    Visit date not found    Medication Request:  Requested Prescriptions     Pending Prescriptions Disp Refills    metoclopramide (REGLAN) 5 MG tablet [Pharmacy Med Name: Metoclopramide HCl Oral Tablet 5 MG] 90 tablet 0     Sig: TAKE 1 TABLET FOUR TIMES DAILY

## 2024-12-26 NOTE — DISCHARGE INSTRUCTIONS
Joe Harry -Phone: [phone] Fax: [phone]    Visit  Discharge Instructions / Physician Orders     DATE: 8/17/2022     Home Care:     SUPPLIES ORDERED THRU: [employer]     Wound Location: Right Plantar Foot     Cleanse with: Liquid antibacterial soap and water, rinse well      Dressing Orders: Coty to wound, Silicone Border Dressing     Frequency: Daily     Additional Orders: Increase protein to diet (meat, cheese, eggs, fish, peanut butter, nuts and beans)  Multivitamin daily  ELEVATE LEGS AS MUCH AS POSSIBLE  6/29/22- X-Ray Ordered  6/29/22- Vascular Studies Ordered     Your next appointment with [address] [place] [address] [facility] 2 weeks with Dr. Laurie Obando     (Please note your next appointment above and if you are unable to keep, kindly give a 24 hour notice. Thank you.)  If more than 15 min late we cannot guarantee you will be seen due to clinician schedule  Per Policy, Excessive cancellation will call for dismissal from program.     If you experience any of the following, please call the [address] [place] [address] during business hours:  [phone]  Your Phone call may be forwarded to Mailbox during business hours that [facility] [email or web address] is closed. * Increase in Pain  * Temperature over 101  * Increase in drainage from your wound  * Drainage with a foul odor  * Bleeding  * Increase in swelling  * Need for compression bandage changes due to slippage, breakthrough drainage. If you need medical attention outside of the business hours of the [address] [place] [address] please contact your PCP or go to the nearest emergency room. The information contained in the After Visit Summary has been reviewed with me, the patient and/or responsible adult, by my health care provider(s). I had the opportunity to ask questions regarding this information.  I have elected to receive;      []After Visit Summary  [x]Comprehensive Discharge Instruction        Patient signature______________________________________Date:________  Electronically signed by Jessica Hackett RN on 8/17/2022 at 8:36 AM  Electronically signed by Cynthia Jauregui DPM on 8/17/2022 at 8:20 AM c/o N/V weight loss and hair-loss onset 1 month ago, reports was evaluated multiple times for the same complaint with negative findings, pt request to be screened for cancer, phx of Anemia, Chron's disease.

## 2025-01-13 DIAGNOSIS — R60.0 EDEMA OF BOTH LEGS: ICD-10-CM

## 2025-01-13 RX ORDER — FUROSEMIDE 40 MG/1
TABLET ORAL
Qty: 90 TABLET | Refills: 0 | OUTPATIENT
Start: 2025-01-13

## 2025-01-13 RX ORDER — ATORVASTATIN CALCIUM 10 MG/1
10 TABLET, FILM COATED ORAL DAILY
Qty: 90 TABLET | Refills: 0 | Status: SHIPPED | OUTPATIENT
Start: 2025-01-13

## 2025-01-13 RX ORDER — FUROSEMIDE 40 MG/1
40 TABLET ORAL EVERY MORNING
Qty: 90 TABLET | Refills: 0 | Status: SHIPPED | OUTPATIENT
Start: 2025-01-13

## 2025-01-13 RX ORDER — ATORVASTATIN CALCIUM 10 MG/1
TABLET, FILM COATED ORAL
Qty: 90 TABLET | Refills: 0 | OUTPATIENT
Start: 2025-01-13

## 2025-03-13 ENCOUNTER — TELEPHONE (OUTPATIENT)
Dept: FAMILY MEDICINE CLINIC | Age: 75
End: 2025-03-13

## 2025-03-13 DIAGNOSIS — Z79.4 TYPE 2 DIABETES MELLITUS WITH HYPERGLYCEMIA, WITH LONG-TERM CURRENT USE OF INSULIN (HCC): Primary | ICD-10-CM

## 2025-03-13 DIAGNOSIS — E11.65 TYPE 2 DIABETES MELLITUS WITH HYPERGLYCEMIA, WITH LONG-TERM CURRENT USE OF INSULIN (HCC): Primary | ICD-10-CM

## 2025-03-13 NOTE — TELEPHONE ENCOUNTER
----- Message from Satya BREEN sent at 3/12/2025  3:58 PM EDT -----  Regarding: ECC Message to Provider  ECC Message to Provider    Relationship to Patient: Self     Additional Information : the patient wants to send the diabetic shoes to the fax# [phone]  --------------------------------------------------------------------------------------------------------------------------    Call Back Information: Do not leave any message, patient will call back for answer  Preferred Call Back Number: [phone] ([employer])

## 2025-03-14 NOTE — TELEPHONE ENCOUNTER
Please sign script .    You can access the FollowMyHealth Patient Portal offered by [facility] by registering at the following website: [email or web address]. By joining [employer]’s FollowMyHealth portal, you will also be able to view your health information using other applications (apps) compatible with our system.

## 2025-03-31 ENCOUNTER — TELEPHONE (OUTPATIENT)
Dept: FAMILY MEDICINE CLINIC | Age: 75
End: 2025-03-31

## 2025-03-31 NOTE — TELEPHONE ENCOUNTER
Called patient to schedule AWV. Unable to reach patient as mail box is full and mychart is not active   [facility]

## 2025-04-07 DIAGNOSIS — R60.0 EDEMA OF BOTH LEGS: ICD-10-CM

## 2025-04-07 DIAGNOSIS — M85.80 OSTEOPENIA, UNSPECIFIED LOCATION: ICD-10-CM

## 2025-04-07 RX ORDER — FUROSEMIDE 40 MG/1
40 TABLET ORAL EVERY MORNING
Qty: 90 TABLET | Refills: 0 | Status: SHIPPED | OUTPATIENT
Start: 2025-04-07

## 2025-04-07 RX ORDER — ALENDRONATE SODIUM 70 MG/1
TABLET ORAL
Qty: 12 TABLET | Refills: 3 | Status: SHIPPED | OUTPATIENT
Start: 2025-04-07

## 2025-04-07 RX ORDER — ATORVASTATIN CALCIUM 10 MG/1
10 TABLET, FILM COATED ORAL DAILY
Qty: 90 TABLET | Refills: 0 | Status: SHIPPED | OUTPATIENT
Start: 2025-04-07

## 2025-04-07 RX ORDER — METOCLOPRAMIDE 5 MG/1
5 TABLET ORAL 4 TIMES DAILY
Qty: 90 TABLET | Refills: 0 | Status: SHIPPED | OUTPATIENT
Start: 2025-04-07

## 2025-04-07 RX ORDER — ASPIRIN 81 MG/1
81 TABLET ORAL DAILY
Qty: 30 TABLET | Refills: 12 | Status: SHIPPED | OUTPATIENT
Start: 2025-04-07

## 2025-04-07 RX ORDER — BLOOD SUGAR DIAGNOSTIC
STRIP MISCELLANEOUS
Qty: 300 STRIP | Refills: 3 | Status: SHIPPED | OUTPATIENT
Start: 2025-04-07

## 2025-04-07 NOTE — TELEPHONE ENCOUNTER
Lauren Lerma is calling to request a refill on the following medication(s):    Last Visit Date (If Applicable):  6/3/2024    Next Visit Date:    Visit date not found    Medication Request:  Requested Prescriptions     Pending Prescriptions Disp Refills    metoclopramide (REGLAN) 5 MG tablet 90 tablet 0     Sig: Take 1 tablet by mouth 4 times daily TAKE 1 TABLET FOUR TIMES DAILY    atorvastatin (LIPITOR) 10 MG tablet 90 tablet 0     Sig: Take 1 tablet by mouth daily    furosemide (LASIX) 40 MG tablet 90 tablet 0     Sig: Take 1 tablet by mouth every morning    alendronate (FOSAMAX) 70 MG tablet 12 tablet 3     Sig: TAKE 1 TABLET EVERY 7 DAYS    aspirin (ASPIRIN LOW DOSE) 81 MG EC tablet 30 tablet 12     Sig: Take 1 tablet by mouth daily    blood glucose test strips (ACCU-CHEK MAMI PLUS) strip 300 strip 3     Sig: TEST THREE TIMES DAILY AS NEEDED                                  [address] [phone] -  [phone]

## 2025-04-14 ENCOUNTER — TELEPHONE (OUTPATIENT)
Dept: FAMILY MEDICINE CLINIC | Age: 75
End: 2025-04-14

## 2025-06-10 DIAGNOSIS — R60.0 EDEMA OF BOTH LEGS: ICD-10-CM

## 2025-06-10 RX ORDER — ATORVASTATIN CALCIUM 10 MG/1
10 TABLET, FILM COATED ORAL DAILY
Qty: 90 TABLET | Refills: 3 | Status: SHIPPED | OUTPATIENT
Start: 2025-06-10

## 2025-06-10 RX ORDER — FUROSEMIDE 40 MG/1
40 TABLET ORAL EVERY MORNING
Qty: 90 TABLET | Refills: 3 | Status: SHIPPED | OUTPATIENT
Start: 2025-06-10

## 2025-06-10 NOTE — TELEPHONE ENCOUNTER
Lauren Lerma is calling to request a refill on the following medication(s):    Last Visit Date (If Applicable):  6/3/2024    Next Visit Date:    6/16/2025    Medication Request:  Requested Prescriptions     Pending Prescriptions Disp Refills    atorvastatin (LIPITOR) 10 MG tablet [Pharmacy Med Name: Atorvastatin Calcium 10 MG Oral Tablet] 90 tablet 3     Sig: TAKE 1 TABLET BY MOUTH DAILY    furosemide (LASIX) 40 MG tablet [Pharmacy Med Name: Furosemide 40 MG Oral Tablet] 90 tablet 3     Sig: TAKE 1 TABLET BY MOUTH EVERY  MORNING

## 2025-06-16 ENCOUNTER — RESULTS FOLLOW-UP (OUTPATIENT)
Dept: FAMILY MEDICINE CLINIC | Age: 75
End: 2025-06-16

## 2025-06-16 ENCOUNTER — OFFICE VISIT (OUTPATIENT)
Dept: FAMILY MEDICINE CLINIC | Age: 75
End: 2025-06-16
Payer: MEDICARE

## 2025-06-16 VITALS
DIASTOLIC BLOOD PRESSURE: 69 MMHG | OXYGEN SATURATION: 100 % | HEIGHT: 69 IN | BODY MASS INDEX: 22.81 KG/M2 | HEART RATE: 67 BPM | WEIGHT: 154 LBS | SYSTOLIC BLOOD PRESSURE: 124 MMHG

## 2025-06-16 DIAGNOSIS — E11.42 DIABETIC POLYNEUROPATHY ASSOCIATED WITH TYPE 2 DIABETES MELLITUS (HCC): ICD-10-CM

## 2025-06-16 DIAGNOSIS — Z00.00 MEDICARE ANNUAL WELLNESS VISIT, SUBSEQUENT: Primary | ICD-10-CM

## 2025-06-16 DIAGNOSIS — M14.671 CHARCOT'S JOINT OF FOOT, RIGHT: ICD-10-CM

## 2025-06-16 DIAGNOSIS — K21.00 GASTROESOPHAGEAL REFLUX DISEASE WITH ESOPHAGITIS WITHOUT HEMORRHAGE: ICD-10-CM

## 2025-06-16 DIAGNOSIS — E55.9 VITAMIN D DEFICIENCY: ICD-10-CM

## 2025-06-16 DIAGNOSIS — E03.9 ACQUIRED HYPOTHYROIDISM: ICD-10-CM

## 2025-06-16 DIAGNOSIS — I10 PRIMARY HYPERTENSION: ICD-10-CM

## 2025-06-16 DIAGNOSIS — R60.0 EDEMA OF BOTH LEGS: ICD-10-CM

## 2025-06-16 DIAGNOSIS — Z12.31 SCREENING MAMMOGRAM, ENCOUNTER FOR: ICD-10-CM

## 2025-06-16 DIAGNOSIS — E78.00 PURE HYPERCHOLESTEROLEMIA: ICD-10-CM

## 2025-06-16 DIAGNOSIS — Z78.0 POST-MENOPAUSAL: ICD-10-CM

## 2025-06-16 DIAGNOSIS — L97.512 RIGHT FOOT ULCER, WITH FAT LAYER EXPOSED (HCC): ICD-10-CM

## 2025-06-16 DIAGNOSIS — Z79.4 TYPE 2 DIABETES MELLITUS WITH HYPERGLYCEMIA, WITH LONG-TERM CURRENT USE OF INSULIN (HCC): ICD-10-CM

## 2025-06-16 DIAGNOSIS — M85.80 OSTEOPENIA, UNSPECIFIED LOCATION: ICD-10-CM

## 2025-06-16 DIAGNOSIS — E11.65 TYPE 2 DIABETES MELLITUS WITH HYPERGLYCEMIA, WITH LONG-TERM CURRENT USE OF INSULIN (HCC): ICD-10-CM

## 2025-06-16 DIAGNOSIS — D50.9 IRON DEFICIENCY ANEMIA, UNSPECIFIED IRON DEFICIENCY ANEMIA TYPE: ICD-10-CM

## 2025-06-16 LAB — HBA1C MFR BLD: 7.2 %

## 2025-06-16 PROCEDURE — 3051F HG A1C>EQUAL 7.0%<8.0%: CPT | Performed by: FAMILY MEDICINE

## 2025-06-16 PROCEDURE — 1159F MED LIST DOCD IN RCRD: CPT | Performed by: FAMILY MEDICINE

## 2025-06-16 PROCEDURE — 3074F SYST BP LT 130 MM HG: CPT | Performed by: FAMILY MEDICINE

## 2025-06-16 PROCEDURE — 1124F ACP DISCUSS-NO DSCNMKR DOCD: CPT | Performed by: FAMILY MEDICINE

## 2025-06-16 PROCEDURE — 3017F COLORECTAL CA SCREEN DOC REV: CPT | Performed by: FAMILY MEDICINE

## 2025-06-16 PROCEDURE — 1160F RVW MEDS BY RX/DR IN RCRD: CPT | Performed by: FAMILY MEDICINE

## 2025-06-16 PROCEDURE — G0439 PPPS, SUBSEQ VISIT: HCPCS | Performed by: FAMILY MEDICINE

## 2025-06-16 PROCEDURE — 83036 HEMOGLOBIN GLYCOSYLATED A1C: CPT | Performed by: FAMILY MEDICINE

## 2025-06-16 PROCEDURE — 3078F DIAST BP <80 MM HG: CPT | Performed by: FAMILY MEDICINE

## 2025-06-16 SDOH — ECONOMIC STABILITY: FOOD INSECURITY: WITHIN THE PAST 12 MONTHS, THE FOOD YOU BOUGHT JUST DIDN'T LAST AND YOU DIDN'T HAVE MONEY TO GET MORE.: NEVER TRUE

## 2025-06-16 SDOH — ECONOMIC STABILITY: FOOD INSECURITY: WITHIN THE PAST 12 MONTHS, YOU WORRIED THAT YOUR FOOD WOULD RUN OUT BEFORE YOU GOT MONEY TO BUY MORE.: NEVER TRUE

## 2025-06-16 ASSESSMENT — PATIENT HEALTH QUESTIONNAIRE - PHQ9
SUM OF ALL RESPONSES TO PHQ QUESTIONS 1-9: 0
1. LITTLE INTEREST OR PLEASURE IN DOING THINGS: NOT AT ALL
SUM OF ALL RESPONSES TO PHQ QUESTIONS 1-9: 0
2. FEELING DOWN, DEPRESSED OR HOPELESS: NOT AT ALL

## 2025-06-16 NOTE — PATIENT INSTRUCTIONS
01-Jun-2017
provider may have ordered immunizations, labs, imaging, and/or referrals for you.  A list of these orders (if applicable) as well as your Preventive Care list are included within your After Visit Summary for your review.

## 2025-06-16 NOTE — PROGRESS NOTES
Medicare Annual Wellness Visit    Lauren KAILEE Lerma is here for Medicare AWV and Diabetes    Assessment & Plan   Medicare annual wellness visit, subsequent  Type 2 diabetes mellitus with hyperglycemia, with long-term current use of insulin (HCC)  -     POCT glycosylated hemoglobin (Hb A1C)  -     Albumin, Random Urine; Future  -     CBC with Auto Differential; Future  -     Comprehensive Metabolic Panel; Future  -     Lipid Panel; Future  -     T4, Free; Future  -     TSH; Future  -     Vitamin D 25 Hydroxy; Future  -     Iron and TIBC; Future  -     Vitamin B12 & Folate; Future  Right foot ulcer, with fat layer exposed (HCC)  -     Albumin, Random Urine; Future  -     CBC with Auto Differential; Future  -     Comprehensive Metabolic Panel; Future  -     Lipid Panel; Future  -     T4, Free; Future  -     TSH; Future  -     Vitamin D 25 Hydroxy; Future  -     Iron and TIBC; Future  -     Vitamin B12 & Folate; Future  Diabetic polyneuropathy associated with type 2 diabetes mellitus (HCC)  -     Albumin, Random Urine; Future  -     CBC with Auto Differential; Future  -     Comprehensive Metabolic Panel; Future  -     Lipid Panel; Future  -     T4, Free; Future  -     TSH; Future  -     Vitamin D 25 Hydroxy; Future  -     Iron and TIBC; Future  -     Vitamin B12 & Folate; Future  Pure hypercholesterolemia  -     Albumin, Random Urine; Future  -     CBC with Auto Differential; Future  -     Comprehensive Metabolic Panel; Future  -     Lipid Panel; Future  -     T4, Free; Future  -     TSH; Future  -     Vitamin D 25 Hydroxy; Future  -     Iron and TIBC; Future  -     Vitamin B12 & Folate; Future  Acquired hypothyroidism  -     Albumin, Random Urine; Future  -     CBC with Auto Differential; Future  -     Comprehensive Metabolic Panel; Future  -     Lipid Panel; Future  -     T4, Free; Future  -     TSH; Future  -     Vitamin D 25 Hydroxy; Future  -     Iron and TIBC; Future  -     Vitamin B12 & Folate; Future  Iron

## 2025-07-01 ENCOUNTER — TELEPHONE (OUTPATIENT)
Dept: FAMILY MEDICINE CLINIC | Age: 75
End: 2025-07-01

## 2025-07-01 ENCOUNTER — RESULTS FOLLOW-UP (OUTPATIENT)
Dept: FAMILY MEDICINE CLINIC | Age: 75
End: 2025-07-01

## 2025-07-01 ENCOUNTER — HOSPITAL ENCOUNTER (OUTPATIENT)
Dept: MAMMOGRAPHY | Age: 75
Discharge: HOME | End: 2025-07-03
Attending: FAMILY MEDICINE
Payer: MEDICARE

## 2025-07-01 ENCOUNTER — HOSPITAL ENCOUNTER (OUTPATIENT)
Age: 75
Setting detail: SPECIMEN
Discharge: HOME | End: 2025-07-01
Payer: MEDICARE

## 2025-07-01 DIAGNOSIS — Z78.0 POST-MENOPAUSAL: ICD-10-CM

## 2025-07-01 DIAGNOSIS — E11.42 DIABETIC POLYNEUROPATHY ASSOCIATED WITH TYPE 2 DIABETES MELLITUS (HCC): ICD-10-CM

## 2025-07-01 DIAGNOSIS — E78.00 PURE HYPERCHOLESTEROLEMIA: ICD-10-CM

## 2025-07-01 DIAGNOSIS — D50.9 IRON DEFICIENCY ANEMIA, UNSPECIFIED IRON DEFICIENCY ANEMIA TYPE: ICD-10-CM

## 2025-07-01 DIAGNOSIS — K21.00 GASTROESOPHAGEAL REFLUX DISEASE WITH ESOPHAGITIS WITHOUT HEMORRHAGE: ICD-10-CM

## 2025-07-01 DIAGNOSIS — M85.80 OSTEOPENIA, UNSPECIFIED LOCATION: ICD-10-CM

## 2025-07-01 DIAGNOSIS — R60.0 EDEMA OF BOTH LEGS: ICD-10-CM

## 2025-07-01 DIAGNOSIS — M14.671 CHARCOT'S JOINT OF FOOT, RIGHT: ICD-10-CM

## 2025-07-01 DIAGNOSIS — I10 PRIMARY HYPERTENSION: ICD-10-CM

## 2025-07-01 DIAGNOSIS — E55.9 VITAMIN D DEFICIENCY: ICD-10-CM

## 2025-07-01 DIAGNOSIS — Z12.31 SCREENING MAMMOGRAM, ENCOUNTER FOR: ICD-10-CM

## 2025-07-01 DIAGNOSIS — L97.512 RIGHT FOOT ULCER, WITH FAT LAYER EXPOSED (HCC): ICD-10-CM

## 2025-07-01 DIAGNOSIS — E11.65 TYPE 2 DIABETES MELLITUS WITH HYPERGLYCEMIA, WITH LONG-TERM CURRENT USE OF INSULIN (HCC): ICD-10-CM

## 2025-07-01 DIAGNOSIS — Z79.4 TYPE 2 DIABETES MELLITUS WITH HYPERGLYCEMIA, WITH LONG-TERM CURRENT USE OF INSULIN (HCC): ICD-10-CM

## 2025-07-01 DIAGNOSIS — E03.9 ACQUIRED HYPOTHYROIDISM: ICD-10-CM

## 2025-07-01 LAB
25(OH)D3 SERPL-MCNC: 37.6 NG/ML (ref 30–100)
ALBUMIN SERPL-MCNC: 4.5 G/DL (ref 3.5–5.2)
ALBUMIN/GLOB SERPL: 1.8 {RATIO} (ref 1–2.5)
ALP SERPL-CCNC: 83 U/L (ref 35–104)
ALT SERPL-CCNC: 21 U/L (ref 10–35)
ANION GAP SERPL CALCULATED.3IONS-SCNC: 12 MMOL/L (ref 9–16)
AST SERPL-CCNC: 23 U/L (ref 10–35)
BASOPHILS # BLD: <0.03 K/UL (ref 0–0.2)
BASOPHILS NFR BLD: 0 % (ref 0–2)
BILIRUB SERPL-MCNC: 0.9 MG/DL (ref 0–1.2)
BUN SERPL-MCNC: 29 MG/DL (ref 8–23)
CALCIUM SERPL-MCNC: 9.8 MG/DL (ref 8.6–10.4)
CHLORIDE SERPL-SCNC: 106 MMOL/L (ref 98–107)
CHOLEST SERPL-MCNC: 136 MG/DL (ref 0–199)
CHOLESTEROL/HDL RATIO: 1.9
CO2 SERPL-SCNC: 28 MMOL/L (ref 20–31)
CREAT SERPL-MCNC: 1.2 MG/DL (ref 0.6–0.9)
CREAT UR-MCNC: 82 MG/DL (ref 28–217)
EOSINOPHIL # BLD: 0.04 K/UL (ref 0–0.44)
EOSINOPHILS RELATIVE PERCENT: 1 % (ref 1–4)
ERYTHROCYTE [DISTWIDTH] IN BLOOD BY AUTOMATED COUNT: 12.9 % (ref 11.8–14.4)
FOLATE SERPL-MCNC: 28.8 NG/ML (ref 4.8–24.2)
GFR, ESTIMATED: 48 ML/MIN/1.73M2
GLUCOSE SERPL-MCNC: 55 MG/DL (ref 74–99)
HCT VFR BLD AUTO: 34.7 % (ref 36.3–47.1)
HDLC SERPL-MCNC: 70 MG/DL
HGB BLD-MCNC: 11.2 G/DL (ref 11.9–15.1)
IMM GRANULOCYTES # BLD AUTO: <0.03 K/UL (ref 0–0.3)
IMM GRANULOCYTES NFR BLD: 0 %
IRON SATN MFR SERPL: 28 % (ref 20–55)
IRON SERPL-MCNC: 65 UG/DL (ref 37–145)
LDLC SERPL CALC-MCNC: 56 MG/DL (ref 0–100)
LYMPHOCYTES NFR BLD: 1.58 K/UL (ref 1.1–3.7)
LYMPHOCYTES RELATIVE PERCENT: 38 % (ref 24–43)
MCH RBC QN AUTO: 29 PG (ref 25.2–33.5)
MCHC RBC AUTO-ENTMCNC: 32.3 G/DL (ref 28.4–34.8)
MCV RBC AUTO: 89.9 FL (ref 82.6–102.9)
MICROALBUMIN UR-MCNC: <12 MG/L (ref 0–20)
MICROALBUMIN/CREAT UR-RTO: NORMAL MCG/MG CREAT (ref 0–25)
MONOCYTES NFR BLD: 0.33 K/UL (ref 0.1–1.2)
MONOCYTES NFR BLD: 8 % (ref 3–12)
NEUTROPHILS NFR BLD: 53 % (ref 36–65)
NEUTS SEG NFR BLD: 2.19 K/UL (ref 1.5–8.1)
NRBC BLD-RTO: 0 PER 100 WBC
PLATELET # BLD AUTO: 169 K/UL (ref 138–453)
PMV BLD AUTO: 12.3 FL (ref 8.1–13.5)
POTASSIUM SERPL-SCNC: 3.7 MMOL/L (ref 3.7–5.3)
PROT SERPL-MCNC: 7 G/DL (ref 6.6–8.7)
RBC # BLD AUTO: 3.86 M/UL (ref 3.95–5.11)
SODIUM SERPL-SCNC: 146 MMOL/L (ref 136–145)
T4 FREE SERPL-MCNC: 1 NG/DL (ref 0.92–1.68)
TIBC SERPL-MCNC: 236 UG/DL (ref 250–450)
TRIGL SERPL-MCNC: 49 MG/DL
TSH SERPL DL<=0.05 MIU/L-ACNC: 0.43 UIU/ML (ref 0.27–4.2)
UNSATURATED IRON BINDING CAPACITY: 171 UG/DL (ref 112–347)
VIT B12 SERPL-MCNC: 983 PG/ML (ref 232–1245)
VLDLC SERPL CALC-MCNC: 10 MG/DL (ref 1–30)
WBC OTHER # BLD: 4.2 K/UL (ref 3.5–11.3)

## 2025-07-01 PROCEDURE — 84443 ASSAY THYROID STIM HORMONE: CPT

## 2025-07-01 PROCEDURE — 82570 ASSAY OF URINE CREATININE: CPT

## 2025-07-01 PROCEDURE — 82043 UR ALBUMIN QUANTITATIVE: CPT

## 2025-07-01 PROCEDURE — 82607 VITAMIN B-12: CPT

## 2025-07-01 PROCEDURE — 82746 ASSAY OF FOLIC ACID SERUM: CPT

## 2025-07-01 PROCEDURE — 80061 LIPID PANEL: CPT

## 2025-07-01 PROCEDURE — 77063 BREAST TOMOSYNTHESIS BI: CPT

## 2025-07-01 PROCEDURE — 80053 COMPREHEN METABOLIC PANEL: CPT

## 2025-07-01 PROCEDURE — 36415 COLL VENOUS BLD VENIPUNCTURE: CPT

## 2025-07-01 PROCEDURE — 84439 ASSAY OF FREE THYROXINE: CPT

## 2025-07-01 PROCEDURE — 83550 IRON BINDING TEST: CPT

## 2025-07-01 PROCEDURE — 83540 ASSAY OF IRON: CPT

## 2025-07-01 PROCEDURE — 82306 VITAMIN D 25 HYDROXY: CPT

## 2025-07-01 PROCEDURE — 85025 COMPLETE CBC W/AUTO DIFF WBC: CPT

## 2025-08-04 DIAGNOSIS — E11.65 TYPE 2 DIABETES MELLITUS WITH HYPERGLYCEMIA, WITH LONG-TERM CURRENT USE OF INSULIN (HCC): ICD-10-CM

## 2025-08-04 DIAGNOSIS — Z79.4 TYPE 2 DIABETES MELLITUS WITH HYPERGLYCEMIA, WITH LONG-TERM CURRENT USE OF INSULIN (HCC): ICD-10-CM

## 2025-08-04 RX ORDER — INSULIN GLARGINE 300 U/ML
INJECTION, SOLUTION SUBCUTANEOUS
Qty: 4.5 ML | Refills: 3 | Status: SHIPPED | OUTPATIENT
Start: 2025-08-04

## 2025-08-20 ENCOUNTER — TELEPHONE (OUTPATIENT)
Dept: PHARMACY | Facility: CLINIC | Age: 75
End: 2025-08-20

## 2025-09-05 ENCOUNTER — TELEPHONE (OUTPATIENT)
Dept: FAMILY MEDICINE CLINIC | Age: 75
End: 2025-09-05